# Patient Record
Sex: FEMALE | Race: WHITE | NOT HISPANIC OR LATINO | Employment: FULL TIME | URBAN - METROPOLITAN AREA
[De-identification: names, ages, dates, MRNs, and addresses within clinical notes are randomized per-mention and may not be internally consistent; named-entity substitution may affect disease eponyms.]

---

## 2017-04-05 ENCOUNTER — APPOINTMENT (OUTPATIENT)
Dept: GENERAL RADIOLOGY | Facility: CLINIC | Age: 37
End: 2017-04-05
Attending: EMERGENCY MEDICINE
Payer: COMMERCIAL

## 2017-04-05 ENCOUNTER — HOSPITAL ENCOUNTER (EMERGENCY)
Facility: CLINIC | Age: 37
Discharge: HOME | End: 2017-04-05
Attending: EMERGENCY MEDICINE | Admitting: EMERGENCY MEDICINE
Payer: COMMERCIAL

## 2017-04-05 VITALS
WEIGHT: 293 LBS | DIASTOLIC BLOOD PRESSURE: 79 MMHG | HEIGHT: 69 IN | RESPIRATION RATE: 18 BRPM | BODY MASS INDEX: 43.4 KG/M2 | OXYGEN SATURATION: 98 % | HEART RATE: 111 BPM | TEMPERATURE: 98.3 F | SYSTOLIC BLOOD PRESSURE: 149 MMHG

## 2017-04-05 DIAGNOSIS — R00.2 PALPITATIONS: ICD-10-CM

## 2017-04-05 LAB
ANION GAP SERPL CALCULATED.3IONS-SCNC: 9 MMOL/L (ref 3–14)
BASOPHILS # BLD AUTO: 0 10E9/L (ref 0–0.2)
BASOPHILS NFR BLD AUTO: 0.6 %
BUN SERPL-MCNC: 13 MG/DL (ref 7–30)
CALCIUM SERPL-MCNC: 8.7 MG/DL (ref 8.5–10.1)
CHLORIDE SERPL-SCNC: 105 MMOL/L (ref 94–109)
CO2 SERPL-SCNC: 24 MMOL/L (ref 20–32)
CREAT SERPL-MCNC: 0.61 MG/DL (ref 0.52–1.04)
D DIMER PPP FEU-MCNC: 0.3 UG/ML FEU (ref 0–0.5)
DIFFERENTIAL METHOD BLD: NORMAL
EOSINOPHIL # BLD AUTO: 0.1 10E9/L (ref 0–0.7)
EOSINOPHIL NFR BLD AUTO: 0.9 %
ERYTHROCYTE [DISTWIDTH] IN BLOOD BY AUTOMATED COUNT: 12.3 % (ref 10–15)
GFR SERPL CREATININE-BSD FRML MDRD: ABNORMAL ML/MIN/1.7M2
GLUCOSE SERPL-MCNC: 102 MG/DL (ref 70–99)
HCT VFR BLD AUTO: 38.8 % (ref 35–47)
HGB BLD-MCNC: 13.3 G/DL (ref 11.7–15.7)
IMM GRANULOCYTES # BLD: 0 10E9/L (ref 0–0.4)
IMM GRANULOCYTES NFR BLD: 0.2 %
INTERPRETATION ECG - MUSE: NORMAL
LYMPHOCYTES # BLD AUTO: 1.1 10E9/L (ref 0.8–5.3)
LYMPHOCYTES NFR BLD AUTO: 16.7 %
MCH RBC QN AUTO: 32.3 PG (ref 26.5–33)
MCHC RBC AUTO-ENTMCNC: 34.3 G/DL (ref 31.5–36.5)
MCV RBC AUTO: 94 FL (ref 78–100)
MONOCYTES # BLD AUTO: 0.5 10E9/L (ref 0–1.3)
MONOCYTES NFR BLD AUTO: 8.3 %
NEUTROPHILS # BLD AUTO: 4.7 10E9/L (ref 1.6–8.3)
NEUTROPHILS NFR BLD AUTO: 73.3 %
NRBC # BLD AUTO: 0 10*3/UL
NRBC BLD AUTO-RTO: 0 /100
PLATELET # BLD AUTO: 339 10E9/L (ref 150–450)
POTASSIUM SERPL-SCNC: 4.4 MMOL/L (ref 3.4–5.3)
RBC # BLD AUTO: 4.12 10E12/L (ref 3.8–5.2)
SODIUM SERPL-SCNC: 138 MMOL/L (ref 133–144)
TROPONIN I SERPL-MCNC: NORMAL UG/L (ref 0–0.04)
WBC # BLD AUTO: 6.4 10E9/L (ref 4–11)

## 2017-04-05 PROCEDURE — 71020 XR CHEST 2 VW: CPT

## 2017-04-05 PROCEDURE — 93005 ELECTROCARDIOGRAM TRACING: CPT

## 2017-04-05 PROCEDURE — 99285 EMERGENCY DEPT VISIT HI MDM: CPT

## 2017-04-05 PROCEDURE — 80048 BASIC METABOLIC PNL TOTAL CA: CPT | Performed by: EMERGENCY MEDICINE

## 2017-04-05 PROCEDURE — 85379 FIBRIN DEGRADATION QUANT: CPT | Performed by: EMERGENCY MEDICINE

## 2017-04-05 PROCEDURE — 85025 COMPLETE CBC W/AUTO DIFF WBC: CPT | Performed by: EMERGENCY MEDICINE

## 2017-04-05 PROCEDURE — 84484 ASSAY OF TROPONIN QUANT: CPT | Performed by: EMERGENCY MEDICINE

## 2017-04-05 RX ORDER — LIDOCAINE 40 MG/G
CREAM TOPICAL
Status: DISCONTINUED | OUTPATIENT
Start: 2017-04-05 | End: 2017-04-05 | Stop reason: HOSPADM

## 2017-04-05 ASSESSMENT — ENCOUNTER SYMPTOMS
PALPITATIONS: 1
SHORTNESS OF BREATH: 0
LIGHT-HEADEDNESS: 0

## 2017-04-05 NOTE — ED NOTES
"ABCs intact. Pt woke up this morning and felt her \"heart racing\". Pt states she checked her pulse and it was racing and irregular. Pt states HR is back to regular and states she does not feel like its racing any more.   "

## 2017-04-05 NOTE — DISCHARGE INSTRUCTIONS
"  * Heart Palpitations    Palpitations refers to the feeling that your heart is beating hard, fast or irregular. Some people describe it as \"pounding\" or \"skipped beats\". Palpitations may occur in persons with heart disease, but can also occur in healthy persons. Your doctor does not believe that anything dangerous is causing your symptoms at this time.  Heart-Related Causes:    Arrhythmia (a change from the heart's normal rhythm)    Disease of the heart valves  Non-Heart-Related Causes:    Certain medicines (such as asthma inhalers and decongestants)    Some herbal supplements, energy drinks and pills, and weight loss pills    Illegal stimulant drugs (such as cocaine, crank, methamphetamine, PCP)    Caffeine, alcohol and tobacco    Medical conditions such as thyroid disease, anemia, anxiety and panic disorder  Sometimes the cause cannot be found.  Home Care:  1. Avoid excess caffeine, alcohol, tobacco and any stimulant drugs.  2. Tell your doctor about any prescription or over-the-counter or herbal medicines you take.  Follow Up  with your doctor or as advised by our staff.  Get Prompt Medical Attention  if any of the following occur together with palpitations:    Weakness, dizziness, light-headed or fainting    Chest pain or shortness of breath    Rapid heart rate (over 120 beats per minute, at rest)    Palpitations that lasts over 20 minutes    Weakness of an arm or leg or one side of the face    Difficulty with speech or vision    0947-9933 [employer] [address] [place] [address]. All rights reserved. This information is not intended as a substitute for professional medical care. Always follow your healthcare professional's instructions.        "

## 2017-04-05 NOTE — ED PROVIDER NOTES
History     Chief Complaint:  Palpitations    The history is provided by the patient.      Coretta Huitron is a 36 year old female with a history of hypertension, CHF, sleep apnea, and depression who presents to the emergency department today for evaluation of palpitations that began this morning. Patient describes walking up after alarm went off and feeling her heart racing. Immediately after this, patient began measuring her HR to be 127 and then 141 bpm, but then later to be regular. Patient reports nausea but attributes this to not having breakfast. She also reports using Sudafed and cold-like illness OTC medications for the last 2 weeks for management of congestions. She uses CPAP at night and did so yesterday. ECHO done in 2015 had EF of 63%, but was otherwise unremarkable per patient. Patient denies leg swelling, shortness of breath, accompanying lightheadedness, or chest pain. No other concerns were voiced at this time.     Cardiac Risk Factors:  The patient has a history of hypertension, obesity, family history of early CAD, sedentary lifestyle, but denies a history of diabetes, hyperlipidemia, tobacco use, autoimmune disease, known coronary artery disease, age greater than 65.    PE/DVT Risk Factors:  The patient denies a personal or family history of PE, DVT, or other clotting disorders. The patient denies any recent travel, surgery, or other prolonged immobilization. The patient denies tobacco use or hormone use.     Allergies:  NKA     Medications:    Wellbutrin  Lexapro  Tylenol    Past Medical History:    Hypertension  Depression, recurrent episode 05/17/2016  Sleep Apnea  Tibia/fibula fracture, 2014  Knee pain, 2014  Morbid obesity with BMI of 50.0-59.9 (H), 2013  Health Care Home, 2012     Past Surgical History:   Fusion Metatarsal, 2004  Excis primary ganglion L wrist, 1994, 1995  Excis recurrent ganglion R wrist, 2009, 2012  Bunionectomy, 2013  Laparoscopic cholecystectomy, 2015     Family  "History:   Mother: Thyroid disease  Father: Hypertension, Coronary Artery Disease, Sleep Apnea  Maternal Grandfather: Cancer  Paternal Grandfather: Colon Cancer     Social History:  Negative for tobacco use.  Positive for alcohol use, very rare.  Marital Status: Single [1]      Review of Systems   Respiratory: Negative for shortness of breath.    Cardiovascular: Positive for palpitations. Negative for chest pain and leg swelling.   Neurological: Negative for light-headedness.   All other systems reviewed and are negative.    Physical Exam     Patient Vitals for the past 24 hrs:   BP Temp Temp src Pulse Heart Rate Resp SpO2 Height Weight   04/05/17 1030 149/79 - - - - - 98 % - -   04/05/17 1015 - - - - 76 - 98 % - -   04/05/17 1000 150/80 - - - 79 - 97 % - -   04/05/17 0930 126/70 - - - 81 - 97 % - -   04/05/17 0915 - - - - 73 - 97 % - -   04/05/17 0900 131/75 - - - 81 - 99 % - -   04/05/17 0845 - - - - 78 - 95 % - -   04/05/17 0830 - - - - 91 - - - -   04/05/17 0817 (!) 145/102 98.3  F (36.8  C) Oral 111 - 18 98 % - -   04/05/17 0815 - - - - - - - 1.753 m (5' 9\") (!) 176.9 kg (390 lb)     Physical Exam  Constitutional: Patient appears well-developed and well-nourished. There is mild distress.   Head: No external signs of trauma noted.  Neck: No JVD noted  Eyes: Pupils are equal, round, and reactive to light.   Cardiovascular: Normal rate, regular rhythm and normal heart sounds.  Exam reveals no gallop and no friction rub.  No murmur heard. Normal peripheral pulses.  Pulmonary/Chest: Effort normal and breath sounds normal. No respiratory distress. Patient has no wheezes. Patient has no rales.   Abdominal: Obese. Soft. There is no tenderness.   Extremities: No edema noted  Neurological: Patient is alert and oriented to person, place, and time.   Skin: Skin is warm and dry. There is no diaphoresis noted.     Emergency Department Course     ECG:  ECG taken at 0819, ECG read at 0819  Normal sinus rhythm  Nonspecific ST " abnormality  Abnormal ECG  Rate 97 bpm. GA interval 136. QRS duration 98. QT/QTc 368/467. P-R-T axes 34 2 61.    Laboratory:  Laboratory findings were communicated with the patient who voiced understanding of the findings.  CBC: AWNL. (WBC 6.4, HGB 13.3, )   BMP: Glucose: 102(H), Creatinine 0.61  Troponin: <0.015  D dimer: 0.3    Emergency Department Course:  Nursing notes and vitals reviewed.  I performed an exam of the patient as documented above.   IV was inserted and blood was drawn for laboratory testing, results above.  The patient was sent for a Chest Xray while in the emergency department, results above.     At 1046 the patient was rechecked and updated on lab results. We also discussed plan of care.     I personally reviewed the treatment plan with the Patient and answered all related questions prior to discharge.    Impression & Plan      Medical Decision Making:  Coretta Huitron is a 36 year old female who presents to the ED for evaluation of palpitations. She was not having shortness of breath or chest pain. She states that at home she checked her HR, which was in the 120's. In the ER, I have seen it as high as in the 100's, however during her time in the ED it has steadily declined to its current rate. The patient does have a history of sleep apnea as well as hypertension, but she states that she was actually able to come off medication for HTN. She uses CPAP every night. She states that she has been taking Sudafed for the last couple weeks for an upper respiratory cold and she was wondering if that could possibly be a cause of her palpitations. Her workup in the ER was negative. Her D dimer was also normal. Her EKG does not show any change from previous. Her HEART score is 2. I believe the patient should be stable for discharge, but I have encouraged her to follow up with her PCP and certainly discontinue the use of the Sudafed as this could be the cause of her palpitations. Anticipatory  guidance given prior to discharge.     Diagnosis:    ICD-10-CM    1. Palpitations R00.2        Disposition:   Discharged to home. Plan for follow up with Bhavana Joseph Disclosure:  I, Jasen Ackerman, am serving as a scribe at 8:15 AM on 4/5/2017 to document services personally performed by Nagi Gilmore DO, based on my observations and the provider's statements to me.  [facility] EMERGENCY DEPARTMENT       Nagi Gilmore DO  04/05/17 1143

## 2017-04-05 NOTE — ED AVS SNAPSHOT
" [facility]    [address]    [facility] [address]    Phone:  [phone]    Fax:  [phone]                                       Coretta Huitron   MRN: [id number]    Department:  [facility]   Date of Visit:  4/5/2017           Patient Information     Date Of Birth          1980        Your diagnoses for this visit were:     Palpitations        You were seen by Nagi Gilmore DO.      Follow-up Information     Follow up with Bhavana Joseph PA. Call in 2 days.    Specialty:  Family Practice    Why:  As needed    Contact information:    [facility]  [address] [facility] [address]  [phone]          Follow up with [facility].    Specialty:  EMERGENCY MEDICINE    Contact information:    [address]  [facility] [address]  [phone]        Discharge Instructions         * Heart Palpitations    Palpitations refers to the feeling that your heart is beating hard, fast or irregular. Some people describe it as \"pounding\" or \"skipped beats\". Palpitations may occur in persons with heart disease, but can also occur in healthy persons. Your doctor does not believe that anything dangerous is causing your symptoms at this time.  Heart-Related Causes:    Arrhythmia (a change from the heart's normal rhythm)    Disease of the heart valves  Non-Heart-Related Causes:    Certain medicines (such as asthma inhalers and decongestants)    Some herbal supplements, energy drinks and pills, and weight loss pills    Illegal stimulant drugs (such as cocaine, crank, methamphetamine, PCP)    Caffeine, alcohol and tobacco    Medical conditions such as thyroid disease, anemia, anxiety and panic disorder  Sometimes the cause cannot be found.  Home Care:  1. Avoid excess caffeine, alcohol, tobacco and any stimulant drugs.  2. Tell your doctor about any " prescription or over-the-counter or herbal medicines you take.  Follow Up  with your doctor or as advised by our staff.  Get Prompt Medical Attention  if any of the following occur together with palpitations:    Weakness, dizziness, light-headed or fainting    Chest pain or shortness of breath    Rapid heart rate (over 120 beats per minute, at rest)    Palpitations that lasts over 20 minutes    Weakness of an arm or leg or one side of the face    Difficulty with speech or vision    1437-5935 [employer]. [address] [place] [address]. All rights reserved. This information is not intended as a substitute for professional medical care. Always follow your healthcare professional's instructions.          24 Hour Appointment Hotline       To make an appointment at any [facility], call [phone] ([phone]). If you don't have a family doctor or clinic, we will help you find one. [facility] are conveniently located to serve the needs of you and your family.             Review of your medicines      Our records show that you are taking the medicines listed below. If these are incorrect, please call your family doctor or clinic.        Dose / Directions Last dose taken    amoxicillin-clavulanate 875-125 MG per tablet   Commonly known as:  AUGMENTIN   Dose:  1 tablet   Quantity:  20 tablet        Take 1 tablet by mouth 2 times daily   Refills:  0        buPROPion 150 MG 24 hr tablet   Commonly known as:  WELLBUTRIN XL   Dose:  150 mg   Quantity:  30 tablet        Take 1 tablet (150 mg) by mouth every morning   Refills:  11        cholecalciferol 1000 UNIT tablet   Commonly known as:  vitamin D   Dose:  5000 Units   Quantity:  100 tablet        Take 5 tablets (5,000 Units) by mouth daily   Refills:  3        escitalopram 20 MG tablet   Commonly known as:  LEXAPRO   Dose:  20 mg   Quantity:  30 tablet        Take 1 tablet (20 mg) by mouth daily   Refills:  11         guaiFENesin-codeine 100-10 MG/5ML Soln solution   Commonly known as:  ROBITUSSIN AC   Dose:  1 tsp.   Quantity:  120 mL        Take 5 mLs by mouth every 4 hours as needed for cough   Refills:  0        ibuprofen 600 MG tablet   Commonly known as:  ADVIL/MOTRIN   Dose:  600 mg   Quantity:  30 tablet        Take 1 tablet (600 mg) by mouth every 6 hours as needed for pain (mild)   Refills:  0        TYLENOL PO   Dose:  500 mg        Take 500 mg by mouth   Refills:  0        VITAMIN C PO        Refills:  0        ZYRTEC ALLERGY PO        Refills:  0                Procedures and tests performed during your visit     Basic metabolic panel    CBC with platelets differential    Chest XR,  PA & LAT    D dimer quantitative    EKG 12 lead    Peripheral IV catheter    Troponin I      Orders Needing Specimen Collection     None      Pending Results     No orders found from 4/3/2017 to 4/6/2017.            Pending Culture Results     No orders found from 4/3/2017 to 4/6/2017.            Test Results From Your Hospital Stay        4/5/2017  9:05 AM      Component Results     Component Value Ref Range & Units Status    WBC 6.4 4.0 - 11.0 10e9/L Final    RBC Count 4.12 3.8 - 5.2 10e12/L Final    Hemoglobin 13.3 11.7 - 15.7 g/dL Final    Hematocrit 38.8 35.0 - 47.0 % Final    MCV 94 78 - 100 fl Final    MCH 32.3 26.5 - 33.0 pg Final    MCHC 34.3 31.5 - 36.5 g/dL Final    RDW 12.3 10.0 - 15.0 % Final    Platelet Count 339 150 - 450 10e9/L Final    Diff Method Automated Method  Final    % Neutrophils 73.3 % Final    % Lymphocytes 16.7 % Final    % Monocytes 8.3 % Final    % Eosinophils 0.9 % Final    % Basophils 0.6 % Final    % Immature Granulocytes 0.2 % Final    Nucleated RBCs 0 0 /100 Final    Absolute Neutrophil 4.7 1.6 - 8.3 10e9/L Final    Absolute Lymphocytes 1.1 0.8 - 5.3 10e9/L Final    Absolute Monocytes 0.5 0.0 - 1.3 10e9/L Final    Absolute Eosinophils 0.1 0.0 - 0.7 10e9/L Final    Absolute Basophils 0.0 0.0 - 0.2 10e9/L  Final    Abs Immature Granulocytes 0.0 0 - 0.4 10e9/L Final    Absolute Nucleated RBC 0.0  Final         4/5/2017  9:32 AM      Component Results     Component Value Ref Range & Units Status    Sodium 138 133 - 144 mmol/L Final    Potassium 4.4 3.4 - 5.3 mmol/L Final    Specimen slightly hemolyzed, potassium may be falsely elevated    Chloride 105 94 - 109 mmol/L Final    Carbon Dioxide 24 20 - 32 mmol/L Final    Anion Gap 9 3 - 14 mmol/L Final    Glucose 102 (H) 70 - 99 mg/dL Final    Urea Nitrogen 13 7 - 30 mg/dL Final    Creatinine 0.61 0.52 - 1.04 mg/dL Final    GFR Estimate >90  Non  GFR Calc   >60 mL/min/1.7m2 Final    GFR Estimate If Black >90   GFR Calc   >60 mL/min/1.7m2 Final    Calcium 8.7 8.5 - 10.1 mg/dL Final         4/5/2017  9:32 AM      Component Results     Component Value Ref Range & Units Status    Troponin I ES  0.000 - 0.045 ug/L Final    <0.015  The 99th percentile for upper reference range is 0.045 ug/L.  Troponin values in   the range of 0.045 - 0.120 ug/L may be associated with risks of adverse   clinical events.           4/5/2017  9:16 AM      Component Results     Component Value Ref Range & Units Status    D Dimer 0.3 0.0 - 0.50 ug/ml FEU Final    This D-dimer assay is intended for use in conjuntion with a clinical pretest   probability assessment model to exclude pulmonary embolism (PE) and as an aid   in the diagnosis of deep venous thrombosis (DVT) in outpatients suspected of   PE   or DVT. The cut-off value is 0.5 g/mL FEU.           4/5/2017  9:55 AM      Narrative     XR CHEST 2 VW 4/5/2017 9:54 AM    HISTORY: palpitations        Impression     IMPRESSION: Negative.    NATO FRANCIS MD                Clinical Quality Measure: Blood Pressure Screening     Your blood pressure was checked while you were in the emergency department today. The last reading we obtained was  BP: 149/79 . Please read the guidelines below about what these numbers mean and  what you should do about them.  If your systolic blood pressure (the top number) is less than 120 and your diastolic blood pressure (the bottom number) is less than 80, then your blood pressure is normal. There is nothing more that you need to do about it.  If your systolic blood pressure (the top number) is 120-139 or your diastolic blood pressure (the bottom number) is 80-89, your blood pressure may be higher than it should be. You should have your blood pressure rechecked within a year by a primary care provider.  If your systolic blood pressure (the top number) is 140 or greater or your diastolic blood pressure (the bottom number) is 90 or greater, you may have high blood pressure. High blood pressure is treatable, but if left untreated over time it can put you at risk for heart attack, stroke, or kidney failure. You should have your blood pressure rechecked by a primary care provider within the next 4 weeks.  If your provider in the emergency department today gave you specific instructions to follow-up with your doctor or provider even sooner than that, you should follow that instruction and not wait for up to 4 weeks for your follow-up visit.        Thank you for choosing [facility]       Thank you for choosing [facility] for your care. Our goal is always to provide you with excellent care. Hearing back from our patients is one way we can continue to improve our services. Please take a few minutes to complete the written survey that you may receive in the mail after you visit with us. Thank you!        [facility] Information     [facility] gives you secure access to your electronic health record. If you see a primary care provider, you can also send messages to your care team and make appointments. If you have questions, please call your primary care clinic.  If you do not have a primary care provider, please call [phone] and they will assist you.        Care EveryWhere ID     This is your Care EveryWhere ID. This  could be used by other organizations to access your [place] medical records  [id number]        After Visit Summary       This is your record. Keep this with you and show to your community pharmacist(s) and doctor(s) at your next visit.

## 2017-04-05 NOTE — ED AVS SNAPSHOT
[facility] Emergency Department    [address]    [facility] [address]    Phone:  [phone]    Fax:  [phone]                                       Coretta Huitron   MRN: [id number]    Department:  [facility] Emergency Department   Date of Visit:  4/5/2017           After Visit Summary Signature Page     I have received my discharge instructions, and my questions have been answered. I have discussed any challenges I see with this plan with the nurse or doctor.    ..........................................................................................................................................  Patient/Patient Representative Signature      ..........................................................................................................................................  Patient Representative Print Name and Relationship to Patient    ..................................................               ................................................  Date                                            Time    ..........................................................................................................................................  Reviewed by Signature/Title    ...................................................              ..............................................  Date                                                            Time

## 2017-04-17 ENCOUNTER — OFFICE VISIT (OUTPATIENT)
Dept: FAMILY MEDICINE | Facility: CLINIC | Age: 37
End: 2017-04-17

## 2017-04-17 VITALS
SYSTOLIC BLOOD PRESSURE: 120 MMHG | BODY MASS INDEX: 43.4 KG/M2 | HEART RATE: 75 BPM | OXYGEN SATURATION: 98 % | HEIGHT: 69 IN | WEIGHT: 293 LBS | TEMPERATURE: 98.3 F | DIASTOLIC BLOOD PRESSURE: 80 MMHG

## 2017-04-17 DIAGNOSIS — R52 BODY ACHES: Primary | ICD-10-CM

## 2017-04-17 PROBLEM — Z82.49 FAMILY HISTORY OF HEART FAILURE: Status: ACTIVE | Noted: 2017-04-17

## 2017-04-17 LAB
FLUAV AG UPPER RESP QL IA.RAPID: NORMAL
FLUBV AG UPPER RESP QL IA.RAPID: NORMAL

## 2017-04-17 PROCEDURE — 87804 INFLUENZA ASSAY W/OPTIC: CPT | Performed by: PHYSICIAN ASSISTANT

## 2017-04-17 PROCEDURE — 99213 OFFICE O/P EST LOW 20 MIN: CPT | Performed by: PHYSICIAN ASSISTANT

## 2017-04-17 NOTE — PROGRESS NOTES
"CC: Flu-like symptoms    History:  2 days ago, Coretta started noticing body aches, fatigue, headache, cough, chills. Yesterday her symptoms got even worse. She denies any fevers. No shortness of breath, wheezing. Still hydrating and getting some nutrition.    No known exposures. Did get flu shot this year. 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    PMH, MEDICATIONS, ALLERGIES, SOCIAL AND FAMILY HISTORY in [facility] and reviewed by me personally.      ROS negative other than the symptoms noted above in the HPI.        Examination   /80 (BP Location: Left arm, Patient Position: Chair, Cuff Size: Adult Large)  Pulse 75  Temp 98.3  F (36.8  C) (Oral)  Ht 1.74 m (5' 8.5\")  Wt (!) 183.1 kg (403 lb 9.6 oz)  LMP 04/05/2017  SpO2 98%  BMI 60.47 kg/m2       Constitutional: Sitting comfortably, in no acute distress. Vital signs noted  Eyes: pupils equal round reactive to light and accomodation, extra ocular movements intact  Ears: external canals and TMs free of abnormalities  Nose: patent, without mucosal abnormalities  Mouth and throat: without erythema or lesions of the mucosa  Neck:  no adenopathy, trachea midline and normal to palpation  Cardiovascular:  regular rate and rhythm, no murmurs, clicks, or gallops  Respiratory:  normal respiratory rate and rhythm, lungs clear to auscultation  Psychiatric: mentation appears normal and affect normal/bright        A/P    ICD-10-CM    1. Body aches R52 Influenza A and B (BFP)       DISCUSSION: Flu swab negative. This is likely a viral syndrome. Recommended ibuprofen for body aches, and headaches. Cough suppressant to take if going to work. Push fluids, nutrition, and try to rest. Gave work note to stay home tomorrow. If she can't get enough fluids to keep urine light yellow and symptoms worsen, should go to ER.    follow up visit: As needed    Alondra Blakely PA-C  [facility]    "

## 2017-04-17 NOTE — MR AVS SNAPSHOT
After Visit Summary   4/17/2017    Coretta Huitron    MRN: [id number]           Patient Information     Date Of Birth          1980        Visit Information        Provider Department      4/17/2017 12:15 PM Alondra Blakely PA-C [facility]        Today's Diagnoses     Body aches    -  1       Follow-ups after your visit        Follow-up notes from your care team     Return if symptoms worsen or fail to improve.      Your next 10 appointments already scheduled     Apr 19, 2017 10:00 AM T   [facility] Office Visit with BRYAN Olson   [facility] ([facility])    [address]  [address]  [facility] [address] [phone]              Who to contact     If you have questions or need follow up information about today's clinic visit or your schedule please contact [facility] directly at [phone].  Normal or non-critical lab and imaging results will be communicated to you by Lolly Wolly Doodlehart, letter or phone within 4 business days after the clinic has received the results. If you do not hear from us within 7 days, please contact the clinic through Health Plotter or phone. If you have a critical or abnormal lab result, we will notify you by phone as soon as possible.  Submit refill requests through Health Plotter or call your pharmacy and they will forward the refill request to us. Please allow 3 business days for your refill to be completed.          Additional Information About Your Visit        [employer]le[employer] Information     Health Plotter gives you secure access to your electronic health record. If you see a primary care provider, you can also send messages to your care team and make appointments. If you have questions, please call your primary care clinic.  If you do not have a primary care provider, please call [phone] and they will assist you.        Care EveryWhere ID     This is your Care  "EveryWhere ID. This could be used by other organizations to access your [place] medical records  [id number]        Your Vitals Were     Pulse Temperature Height Last Period Pulse Oximetry BMI (Body Mass Index)    75 98.3  F (36.8  C) (Oral) 1.74 m (5' 8.5\") 04/05/2017 98% 60.47 kg/m2       Blood Pressure from Last 3 Encounters:   04/17/17 120/80   04/05/17 149/79   11/16/16 104/62    Weight from Last 3 Encounters:   04/17/17 (!) 183.1 kg (403 lb 9.6 oz)   04/05/17 (!) 176.9 kg (390 lb)   11/16/16 (!) 176.9 kg (390 lb)              We Performed the Following     Influenza A and B (BFP)        Primary Care Provider Office Phone # Fax #    BRYAN Olson [phone]       [facility]  [address] [facility] [address]        Thank you!     Thank you for choosing [facility]  for your care. Our goal is always to provide you with excellent care. Hearing back from our patients is one way we can continue to improve our services. Please take a few minutes to complete the written survey that you may receive in the mail after your visit with us. Thank you!             Your Updated Medication List - Protect others around you: Learn how to safely use, store and throw away your medicines at www.disposemymeds.org.          This list is accurate as of: 4/17/17  4:59 PM.  Always use your most recent med list.                   Brand Name Dispense Instructions for use    buPROPion 150 MG 24 hr tablet    WELLBUTRIN XL    30 tablet    Take 1 tablet (150 mg) by mouth every morning       cholecalciferol 5000 UNITS Caps capsule    vitamin D3     Take by mouth daily       escitalopram 20 MG tablet    LEXAPRO    30 tablet    Take 1 tablet (20 mg) by mouth daily       ibuprofen 600 MG tablet    ADVIL/MOTRIN    30 tablet    Take 1 tablet (600 mg) by mouth every 6 hours as needed for pain (mild)       TYLENOL PO      Take 500 mg by mouth       VITAMIN C PO          " ZYRTEC ALLERGY PO

## 2017-04-17 NOTE — NURSING NOTE
Coretta is here for possible influenza    Pre-Visit Screening :  Immunizations : up to date  Colonoscopy : NA  Mammogram : NA  Asthma Action Test/Plan : NA  PHQ9/GAD7 :  NA  Pulse - regular  My Chart - accepts    CLASSIFICATION OF OVERWEIGHT AND OBESITY BY BMI                         Obesity Class           BMI(kg/m2)  Underweight                                    < 18.5  Normal                                         18.5-24.9  Overweight                                     25.0-29.9  OBESITY                     I                  30.0-34.9                              II                 35.0-39.9  EXTREME OBESITY             III                >40                             Patient's  BMI Body mass index is 60.47 kg/(m^2).  http://hin.nhlbi.nih.gov/menuplanner/menu.cgi  Questioned patient about current smoking habits.  Pt. has never smoked.

## 2017-04-19 ENCOUNTER — OFFICE VISIT (OUTPATIENT)
Dept: FAMILY MEDICINE | Facility: CLINIC | Age: 37
End: 2017-04-19

## 2017-04-19 VITALS
WEIGHT: 293 LBS | TEMPERATURE: 98.4 F | RESPIRATION RATE: 20 BRPM | DIASTOLIC BLOOD PRESSURE: 84 MMHG | SYSTOLIC BLOOD PRESSURE: 128 MMHG | HEIGHT: 69 IN | HEART RATE: 74 BPM | BODY MASS INDEX: 43.4 KG/M2 | OXYGEN SATURATION: 97 %

## 2017-04-19 DIAGNOSIS — E55.9 VITAMIN D DEFICIENCY: ICD-10-CM

## 2017-04-19 DIAGNOSIS — R05.9 COUGH: Primary | ICD-10-CM

## 2017-04-19 LAB
% GRANULOCYTES: 65.3 %
HCT VFR BLD AUTO: 44.2 % (ref 35–47)
HEMOGLOBIN: 14.5 G/DL (ref 11.7–15.7)
LYMPHOCYTES NFR BLD AUTO: 26.8 %
MCH RBC QN AUTO: 31.9 PG (ref 26–33)
MCHC RBC AUTO-ENTMCNC: 32.8 G/DL (ref 31–36)
MCV RBC AUTO: 97.4 FL (ref 78–100)
MONOCYTES NFR BLD AUTO: 7.9 %
PLATELET COUNT - QUEST: 433 10^9/L (ref 150–375)
RBC # BLD AUTO: 4.54 10*12/L (ref 3.8–5.2)
WBC # BLD AUTO: 5.4 10*9/L (ref 4–11)

## 2017-04-19 PROCEDURE — 36415 COLL VENOUS BLD VENIPUNCTURE: CPT | Performed by: PHYSICIAN ASSISTANT

## 2017-04-19 PROCEDURE — 82306 VITAMIN D 25 HYDROXY: CPT | Mod: 90 | Performed by: PHYSICIAN ASSISTANT

## 2017-04-19 PROCEDURE — 99213 OFFICE O/P EST LOW 20 MIN: CPT | Performed by: PHYSICIAN ASSISTANT

## 2017-04-19 PROCEDURE — 85025 COMPLETE CBC W/AUTO DIFF WBC: CPT | Performed by: PHYSICIAN ASSISTANT

## 2017-04-19 RX ORDER — CODEINE PHOSPHATE AND GUAIFENESIN 10; 100 MG/5ML; MG/5ML
1 SOLUTION ORAL EVERY 4 HOURS PRN
Qty: 120 ML | Refills: 0 | Status: SHIPPED | OUTPATIENT
Start: 2017-04-19 | End: 2017-07-12

## 2017-04-19 NOTE — LETTER
[facility]  [facility] [address]  [place]  [address]  [phone]              Return to  Work Release    Date: 4/19/2017      Name: Coretta Huitron                       YOB: 1980  Soc.Sec.No: [id number]      The patient was seen at [facility]    Restrictions if any: OFF Work for today and tomorrow.    Resume Activity: With no limitations.       Thanks,  Bhavana Joseph PA-C

## 2017-04-19 NOTE — NURSING NOTE
Coretta Huitron is here for a cough that she wants check and also Vit D recheck.  Questioned patient about current smoking habits.  Pt. has never smoked.  Body mass index is 25.89 kg/(m^2).  PULSE regular  My Chart: active  CLASSIFICATION OF OVERWEIGHT AND OBESITY BY BMI                        Obesity Class           BMI(kg/m2)  Underweight                                    < 18.5  Normal                                         18.5-24.9  Overweight                                     25.0-29.9  OBESITY                     I                  30.0-34.9                             II                 35.0-39.9  EXTREME OBESITY             III                >40                            Patient's  BMI Body mass index is 60.38 kg/(m^2).  http://hin.nhlbi.nih.gov/menuplanner/menu.cgi    Pre-visit planning  Immunizations - up to date  Colonoscopy -   Mammogram -   Asthma -   PHQ9 -    DANIA-7 -

## 2017-04-19 NOTE — MR AVS SNAPSHOT
"              After Visit Summary   4/19/2017    Coretta Huitron    MRN: [id number]           Patient Information     Date Of Birth          1980        Visit Information        Provider Department      4/19/2017 10:00 AM Bhavana Joseph PA [facility]        Today's Diagnoses     Cough    -  1    Vitamin D deficiency           Follow-ups after your visit        Who to contact     If you have questions or need follow up information about today's clinic visit or your schedule please contact [facility] directly at [phone].  Normal or non-critical lab and imaging results will be communicated to you by Azullohart, letter or phone within 4 business days after the clinic has received the results. If you do not hear from us within 7 days, please contact the clinic through Azullohart or phone. If you have a critical or abnormal lab result, we will notify you by phone as soon as possible.  Submit refill requests through [employer] or call your pharmacy and they will forward the refill request to us. Please allow 3 business days for your refill to be completed.          Additional Information About Your Visit        MyChart Information     [employer]ptive gives you secure access to your electronic health record. If you see a primary care provider, you can also send messages to your care team and make appointments. If you have questions, please call your primary care clinic.  If you do not have a primary care provider, please call [phone] and they will assist you.        Care EveryWhere ID     This is your Care EveryWhere ID. This could be used by other organizations to access your [place] medical records  [id number]        Your Vitals Were     Pulse Temperature Respirations Height Last Period Pulse Oximetry    74 98.4  F (36.9  C) (Oral) 20 1.74 m (5' 8.5\") 04/05/2017 97%    BMI (Body Mass Index)                   60.38 kg/m2            Blood Pressure from Last 3 " Encounters:   04/19/17 128/84   04/17/17 120/80   04/05/17 149/79    Weight from Last 3 Encounters:   04/19/17 (!) 182.8 kg (403 lb)   04/17/17 (!) 183.1 kg (403 lb 9.6 oz)   04/05/17 (!) 176.9 kg (390 lb)              We Performed the Following     CL AFF HEMOGRAM/PLATE/DIFF (BFP)     VENOUS COLLECTION     Vitamin D Deficiency          Today's Medication Changes          These changes are accurate as of: 4/19/17 11:59 PM.  If you have any questions, ask your nurse or doctor.               Start taking these medicines.        Dose/Directions    guaiFENesin-codeine 100-10 MG/5ML Soln solution   Commonly known as:  ROBITUSSIN AC   Used for:  Cough   Started by:  Bhavana Joseph PA        Dose:  1 tsp.   Take 5 mLs by mouth every 4 hours as needed for cough   Quantity:  120 mL   Refills:  0            Where to get your medicines      Some of these will need a paper prescription and others can be bought over the counter.  Ask your nurse if you have questions.     Bring a paper prescription for each of these medications     guaiFENesin-codeine 100-10 MG/5ML Soln solution                Primary Care Provider Office Phone # Fax #    BRYAN Olson [phone]       [place]  [address] [facility] [address]        Thank you!     Thank you for choosing [facility]  for your care. Our goal is always to provide you with excellent care. Hearing back from our patients is one way we can continue to improve our services. Please take a few minutes to complete the written survey that you may receive in the mail after your visit with us. Thank you!             Your Updated Medication List - Protect others around you: Learn how to safely use, store and throw away your medicines at www.disposemymeds.org.          This list is accurate as of: 4/19/17 11:59 PM.  Always use your most recent med list.                   Brand Name Dispense Instructions  for use    buPROPion 150 MG 24 hr tablet    WELLBUTRIN XL    30 tablet    Take 1 tablet (150 mg) by mouth every morning       cholecalciferol 5000 UNITS Caps capsule    vitamin D3     Take by mouth daily       escitalopram 20 MG tablet    LEXAPRO    30 tablet    Take 1 tablet (20 mg) by mouth daily       guaiFENesin-codeine 100-10 MG/5ML Soln solution    ROBITUSSIN AC    120 mL    Take 5 mLs by mouth every 4 hours as needed for cough       ibuprofen 600 MG tablet    ADVIL/MOTRIN    30 tablet    Take 1 tablet (600 mg) by mouth every 6 hours as needed for pain (mild)       TYLENOL PO      Take 500 mg by mouth       VITAMIN C PO          ZYRTEC ALLERGY PO

## 2017-04-19 NOTE — PROGRESS NOTES
SUBJECTIVE:                                                    Coretta Huitron is a 36 year old female who presents to clinic today for the following health issues:    Coretta  is here today because of continued cough and body aches.  Seen earlier this week by Alondra    Continues to have low grade temp, chills, body aches, laryngitis, cough, rhinorrhea, headache and fatigue.     Body aches started Saturday am.    Pt did get flu shot.    Delsym didn't help  Robitussin with AC helped    Strated getting SOB up stairs yesterday      Was in ED on 4/5  Woke up   Lueders palpitations  Pulse was fast and irregular.  Pulse 121 and 147 initially  No pain or  SOB.  Within 30 min rate was regular and slowed.    Was taking Sudafed.    Family Uncle - A fib  Rapid ventricular rate, has defib.       Chest Xray on 4/5 normal.    Vit 3D 5,000 IU daily.           BP Readings from Last 3 Encounters:   04/19/17 128/84   04/17/17 120/80   04/05/17 149/79    Wt Readings from Last 3 Encounters:   04/19/17 (!) 182.8 kg (403 lb)   04/17/17 (!) 183.1 kg (403 lb 9.6 oz)   04/05/17 (!) 176.9 kg (390 lb)                  Patient Active Problem List   Diagnosis     Irritable bowel syndrome     ACP (advance care planning)     Health Care Home     Morbid obesity with BMI of 50.0-59.9, adult (H)     Obstructive sleep apnea syndrome     Vitamin D deficiency     Tooth decay/ multiple cavities 2013     Knee pain     Major depressive disorder, recurrent episode, moderate (H)     Family history of heart failure     Hypertension     Past Surgical History:   Procedure Laterality Date     BUNIONECTOMY  12/5/2013    Procedure: BUNIONECTOMY;   correct of bunion and bunionette deformation right with oteotomies;  Surgeon: Tanesha Rosa DPM;  Location: [facility] OR     HC CORRECT BUNION,SIMPLE  12/2004    fusion metatarsal     HC EXCIS PRIMARY GANGLION WRIST  1994, 1995    left      HC EXCIS RECURRENT GANGLION WRIST   2009, 2012    right     HC REMOVE  "TONSILS/ADENOIDS,<13 Y/O      age 8     LAPAROSCOPIC CHOLECYSTECTOMY N/A 2015    Procedure: LAPAROSCOPIC CHOLECYSTECTOMY;  Surgeon: Alyson Horne MD;  Location:  OR       Social History   Substance Use Topics     Smoking status: Never Smoker     Smokeless tobacco: Never Used     Alcohol use 0.0 oz/week     0 Standard drinks or equivalent per week      Comment: very rare     Family History   Problem Relation Age of Onset     CANCER Maternal Grandfather      CANCER Paternal Grandfather      Colon Cancer Paternal Grandfather      Thyroid Disease Mother      Hypertension Father      Coronary Artery Disease Father 65     MI -  in      Thyroid Disease Maternal Grandmother      Sleep Apnea Father          Current Outpatient Prescriptions   Medication Sig Dispense Refill     guaiFENesin-codeine (ROBITUSSIN AC) 100-10 MG/5ML SOLN solution Take 5 mLs by mouth every 4 hours as needed for cough 120 mL 0     cholecalciferol (VITAMIN D3) 5000 UNITS CAPS capsule Take by mouth daily       Acetaminophen (TYLENOL PO) Take 500 mg by mouth       Ascorbic Acid (VITAMIN C PO)        Cetirizine HCl (ZYRTEC ALLERGY PO)        buPROPion (WELLBUTRIN XL) 150 MG 24 hr tablet Take 1 tablet (150 mg) by mouth every morning 30 tablet 11     escitalopram (LEXAPRO) 20 MG tablet Take 1 tablet (20 mg) by mouth daily 30 tablet 11     ibuprofen (ADVIL,MOTRIN) 600 MG tablet Take 1 tablet (600 mg) by mouth every 6 hours as needed for pain (mild) 30 tablet 0     Allergies   Allergen Reactions     No Known Drug Allergies        OBJECTIVE:                                                    /84 (BP Location: Left arm, Patient Position: Chair, Cuff Size: Adult Large)  Pulse 74  Temp 98.4  F (36.9  C) (Oral)  Resp 20  Ht 1.74 m (5' 8.5\")  Wt (!) 182.8 kg (403 lb)  LMP 2017  SpO2 97%  BMI 60.38 kg/m2   Body mass index is 60.38 kg/(m^2).     GENERAL: healthy, alert and no distress  EYES:Lids and Conjunctival normal, no " discharge present bilaterally  EARS:   Right: CANAL and TM is normal: no effusions, no erythema, and normal landmarks  Left: CANAL and TM is normal: no effusions, no erythema, and normal landmarks  NOSE: normal  OROPHARYNX: normal, no tonsillar hypertrophy and no exudates present  NECK: normal, supple and no adenopathy  HEART: regular rate and rhythm; no murmurs, clicks, or gallops  LUNGS: CTA bilaterally  SKIN:Warm, Dry and No Rash    Labs Resulted Today:   Results for orders placed or performed in visit on 04/19/17   Vitamin D Deficiency   Result Value Ref Range    Vitamin D 25-OH Total 31 30 - 100 ng/mL   CL AFF HEMOGRAM/PLATE/DIFF (BFP)   Result Value Ref Range    WBC 5.4 4.0 - 11 10*9/L    RBC Count 4.54 3.8 - 5.2 10*12/L    Hemoglobin 14.5 11.7 - 15.7 g/dL    Hematocrit 44.2 35.0 - 47.0 %    MCV 97.4 78 - 100 fL    MCH 31.9 26 - 33 pg    MCHC 32.8 31 - 36 g/dL    Platelet Count 433 (A) 150 - 375 10^9/L    % Granulocytes 65.3 %    % Lymphocytes 26.8 %    % Monocytes 7.9 %                ASSESSMENT/PLAN:                                                          P:     1. Cough  - CL AFF HEMOGRAM/PLATE/DIFF (BFP)  - VENOUS COLLECTION  - guaiFENesin-codeine (ROBITUSSIN AC) 100-10 MG/5ML SOLN solution; Take 5 mLs by mouth every 4 hours as needed for cough  Dispense: 120 mL; Refill: 0    CBC reassuring, lung exam NL, O2 Sats normal.  Advised RTC if sx worsening.  No EtOH or driving with Robitussin AC    2. Vitamin D deficiency    - Vitamin D Deficiency  - CL AFF HEMOGRAM/PLATE/DIFF (BFP)      The patient is to RTC prn if these symptoms worsen or fail to improve as anticipated.       Bhavana Joseph PA-C  4/19/2017

## 2017-04-20 LAB — VITAMIN D, 25-OH, TOTAL - QUEST: 31 NG/ML (ref 30–100)

## 2017-06-21 ENCOUNTER — MYC MEDICAL ADVICE (OUTPATIENT)
Dept: FAMILY MEDICINE | Facility: CLINIC | Age: 37
End: 2017-06-21

## 2017-06-21 NOTE — TELEPHONE ENCOUNTER
From: Coretta Huitron  To: Bhavana Joseph PA  Sent: 6/21/2017 1:33 PM CDT  Subject: Do I need an appointment?    Jhon Bateman,    I have a quick question. The last few days I've had recurring nausea. 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 I also have a burning feeling in the pit of my stomach. Today the nausea and burning happened about 45-1hr after eating lunch. I'm wondering if I have a stress stomach ulcer? 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 Wondering if there's any testing or treatment? In the past dr. lakhani has tested me for H.Pylori that has always come up negative. Just wanted to make sure I'm covering all my bases.  thanks  Doreen Huitron

## 2017-07-01 ENCOUNTER — TRANSFERRED RECORDS (OUTPATIENT)
Dept: FAMILY MEDICINE | Facility: CLINIC | Age: 37
End: 2017-07-01

## 2017-07-12 ENCOUNTER — OFFICE VISIT (OUTPATIENT)
Dept: FAMILY MEDICINE | Facility: CLINIC | Age: 37
End: 2017-07-12

## 2017-07-12 ENCOUNTER — TELEPHONE (OUTPATIENT)
Dept: FAMILY MEDICINE | Facility: CLINIC | Age: 37
End: 2017-07-12

## 2017-07-12 VITALS
HEART RATE: 81 BPM | WEIGHT: 293 LBS | TEMPERATURE: 97.4 F | SYSTOLIC BLOOD PRESSURE: 132 MMHG | OXYGEN SATURATION: 98 % | DIASTOLIC BLOOD PRESSURE: 88 MMHG | BODY MASS INDEX: 61.01 KG/M2

## 2017-07-12 DIAGNOSIS — E66.01 MORBID OBESITY, UNSPECIFIED OBESITY TYPE (H): ICD-10-CM

## 2017-07-12 DIAGNOSIS — F33.1 MAJOR DEPRESSIVE DISORDER, RECURRENT EPISODE, MODERATE (H): ICD-10-CM

## 2017-07-12 DIAGNOSIS — M54.50 CHRONIC BILATERAL LOW BACK PAIN WITHOUT SCIATICA: Primary | ICD-10-CM

## 2017-07-12 DIAGNOSIS — T78.40XD ALLERGIC STATE, SUBSEQUENT ENCOUNTER: ICD-10-CM

## 2017-07-12 DIAGNOSIS — G89.29 CHRONIC BILATERAL LOW BACK PAIN WITHOUT SCIATICA: Primary | ICD-10-CM

## 2017-07-12 DIAGNOSIS — G44.209 TENSION HEADACHE: ICD-10-CM

## 2017-07-12 DIAGNOSIS — G25.81 RESTLESS LEGS SYNDROME (RLS): ICD-10-CM

## 2017-07-12 LAB
ERYTHROCYTE [DISTWIDTH] IN BLOOD BY AUTOMATED COUNT: 12.1 %
HCT VFR BLD AUTO: 41.4 % (ref 35–47)
HEMOGLOBIN: 14.2 G/DL (ref 11.7–15.7)
MCH RBC QN AUTO: 33.6 PG (ref 26–33)
MCHC RBC AUTO-ENTMCNC: 34.3 G/DL (ref 31–36)
MCV RBC AUTO: 98.1 FL (ref 78–100)
PLATELET COUNT - QUEST: 363 10^9/L (ref 150–375)
RBC # BLD AUTO: 4.22 10*12/L (ref 3.8–5.2)
WBC # BLD AUTO: 5.1 10*9/L (ref 4–11)

## 2017-07-12 PROCEDURE — 82728 ASSAY OF FERRITIN: CPT | Mod: 90 | Performed by: PHYSICIAN ASSISTANT

## 2017-07-12 PROCEDURE — 83540 ASSAY OF IRON: CPT | Mod: 90 | Performed by: PHYSICIAN ASSISTANT

## 2017-07-12 PROCEDURE — 99214 OFFICE O/P EST MOD 30 MIN: CPT | Performed by: PHYSICIAN ASSISTANT

## 2017-07-12 PROCEDURE — 83550 IRON BINDING TEST: CPT | Mod: 90 | Performed by: PHYSICIAN ASSISTANT

## 2017-07-12 PROCEDURE — 36415 COLL VENOUS BLD VENIPUNCTURE: CPT | Performed by: PHYSICIAN ASSISTANT

## 2017-07-12 PROCEDURE — 85027 COMPLETE CBC AUTOMATED: CPT | Performed by: PHYSICIAN ASSISTANT

## 2017-07-12 RX ORDER — ESCITALOPRAM OXALATE 20 MG/1
20 TABLET ORAL DAILY
Qty: 90 TABLET | Refills: 3 | Status: SHIPPED | OUTPATIENT
Start: 2017-07-12 | End: 2018-07-18

## 2017-07-12 RX ORDER — BUPROPION HYDROCHLORIDE 150 MG/1
150 TABLET ORAL EVERY MORNING
Qty: 90 TABLET | Refills: 3 | Status: SHIPPED | OUTPATIENT
Start: 2017-07-12 | End: 2018-07-18

## 2017-07-12 ASSESSMENT — ANXIETY QUESTIONNAIRES
6. BECOMING EASILY ANNOYED OR IRRITABLE: SEVERAL DAYS
1. FEELING NERVOUS, ANXIOUS, OR ON EDGE: NOT AT ALL
IF YOU CHECKED OFF ANY PROBLEMS ON THIS QUESTIONNAIRE, HOW DIFFICULT HAVE THESE PROBLEMS MADE IT FOR YOU TO DO YOUR WORK, TAKE CARE OF THINGS AT HOME, OR GET ALONG WITH OTHER PEOPLE: EXTREMELY DIFFICULT
5. BEING SO RESTLESS THAT IT IS HARD TO SIT STILL: SEVERAL DAYS
2. NOT BEING ABLE TO STOP OR CONTROL WORRYING: SEVERAL DAYS
3. WORRYING TOO MUCH ABOUT DIFFERENT THINGS: SEVERAL DAYS

## 2017-07-12 ASSESSMENT — PATIENT HEALTH QUESTIONNAIRE - PHQ9: 5. POOR APPETITE OR OVEREATING: NOT AT ALL

## 2017-07-12 NOTE — NURSING NOTE
Coretta is here for medication check and HA's    Pre-Visit Screening :  Immunizations : up to date  Colonoscopy : NA  Mammogram : NA  Asthma Action Test/Plan : NA  PHQ9/GAD7 :  NA  Pulse - regular  My Chart - accepts    CLASSIFICATION OF OVERWEIGHT AND OBESITY BY BMI                         Obesity Class           BMI(kg/m2)  Underweight                                    < 18.5  Normal                                         18.5-24.9  Overweight                                     25.0-29.9  OBESITY                     I                  30.0-34.9                              II                 35.0-39.9  EXTREME OBESITY             III                >40                             Patient's  BMI Body mass index is 61.01 kg/(m^2).  http://hin.nhlbi.nih.gov/menuplanner/menu.cgi  Questioned patient about current smoking habits.  Pt. has never smoked.

## 2017-07-12 NOTE — PROGRESS NOTES
SUBJECTIVE:                                                    Coretta Huitron is a 36 year old female who presents to clinic today for the following health issues:      Depression and Anxiety Followup    Status since last visit: Stable     See PHQ-9 for current symptoms.  Other associated symptoms: None    Complicating factors:   Significant life event:  No   Current substance abuse:  None  Anxiety or Panic symptoms:  No      Obesity- has been a pt at [facility] [place], [place] - no organic  Cause of weight  Has done well on supplemental program but at plateau  Not tracking calories  Hasn't seen nutritionist.    Due to weight:   Lower back pain - chronic - worse with weight gain.  No radiation  No bowel/bladder loss.   MRI of low back in the past showed no herniated discs    Wt Readings from Last 5 Encounters:   07/12/17 (!) 184.7 kg (407 lb 3.2 oz)   04/19/17 (!) 182.8 kg (403 lb)   04/17/17 (!) 183.1 kg (403 lb 9.6 oz)   04/05/17 (!) 176.9 kg (390 lb)   11/16/16 (!) 176.9 kg (390 lb)     Body mass index is 61.01 kg/(m^2).        RLS - started 3-4 weeks - having trouble falling asleep  Strong family hx  Paternal aunt, cousin and grandmother.     Headaches  Onset: 1 month ago    Description:   Location: Tension in frontal area bilaterally  Character: ache, tightness  Frequency:  Qd or qod - around 1-2pm   Duration:  Will have to go home an nap - then better        Accompanying Signs & Symptoms:  Stiff neck: no  Neck or upper back pain: YES  Fever: no   Sinus pressure: no   Nausea or vomiting: always whole life has nausea  Dizziness: squat to stand occasionally  Numbness: no   Weakness: no   Visual changes: no     History:   Head trauma: no   Family history of migraines: YES- father  Previous tests for headaches: no   Neurologist evaluations: no   Able to do daily activities: not when bad  Wake with a headaches: no   Do headaches wake you up: no   Daily pain medication use: no   Work/school stressors/changes: YES-  work    Precipitating factors:   Does light make it worse: YES  Does sound make it worse: YES    Alleviating factors:  Does sleep help: YES    Therapies Tried and outcome: Iburpfoen - gave her ulcer-like sx.  Omprazole for 2 weeks improved sx.   No NSAIDs for 3 week.  APAP recently started    Also had massages for 3 weeks in a row and HA improved    Tylenol, Benadryl, and Sudafed    Allergies  - Allegra daily    Last eye exam 2017    BP Readings from Last 6 Encounters:   07/12/17 132/88   04/19/17 128/84   04/17/17 120/80   04/05/17 149/79   11/16/16 104/62   10/06/16 114/72         Problem list and histories reviewed & adjusted, as indicated.  Additional history: as documented    Patient Active Problem List   Diagnosis     Irritable bowel syndrome     ACP (advance care planning)     Health Care Home     Morbid obesity with BMI of 50.0-59.9, adult (H)     Obstructive sleep apnea syndrome     Vitamin D deficiency     Tooth decay/ multiple cavities 2013     Knee pain     Major depressive disorder, recurrent episode, moderate (H)     Family history of heart failure     Hypertension     Past Surgical History:   Procedure Laterality Date     BUNIONECTOMY  12/5/2013    Procedure: BUNIONECTOMY;   correct of bunion and bunionette deformation right with oteotomies;  Surgeon: Tanesha Rosa DPM;  Location: [facility] OR     HC CORRECT BUNION,SIMPLE  12/2004    fusion metatarsal     HC EXCIS PRIMARY GANGLION WRIST  1994, 1995    left      HC EXCIS RECURRENT GANGLION WRIST   2009, 2012    right     HC REMOVE TONSILS/ADENOIDS,<13 Y/O      age 8     LAPAROSCOPIC CHOLECYSTECTOMY N/A 11/4/2015    Procedure: LAPAROSCOPIC CHOLECYSTECTOMY;  Surgeon: Alyson Horne MD;  Location: [facility] OR       Social History   Substance Use Topics     Smoking status: Never Smoker     Smokeless tobacco: Never Used     Alcohol use 0.0 oz/week     0 Standard drinks or equivalent per week      Comment: very rare     Family History   Problem Relation  Age of Onset     CANCER Maternal Grandfather      CANCER Paternal Grandfather      Colon Cancer Paternal Grandfather      Thyroid Disease Mother      Hypertension Father      Coronary Artery Disease Father 65     MI -  in 2015     Thyroid Disease Maternal Grandmother      Sleep Apnea Father          Current Outpatient Prescriptions   Medication Sig Dispense Refill     buPROPion (WELLBUTRIN XL) 150 MG 24 hr tablet Take 1 tablet (150 mg) by mouth every morning 30 tablet 0     escitalopram (LEXAPRO) 20 MG tablet Take 1 tablet (20 mg) by mouth daily 30 tablet 0     cholecalciferol (VITAMIN D3) 5000 UNITS CAPS capsule Take by mouth daily       Acetaminophen (TYLENOL PO) Take 500 mg by mouth       Ascorbic Acid (VITAMIN C PO)        Cetirizine HCl (ZYRTEC ALLERGY PO)        ibuprofen (ADVIL,MOTRIN) 600 MG tablet Take 1 tablet (600 mg) by mouth every 6 hours as needed for pain (mild) 30 tablet 0     Allergies   Allergen Reactions     No Known Drug Allergies      BP Readings from Last 3 Encounters:   17 132/88   17 128/84   17 120/80    Wt Readings from Last 3 Encounters:   17 (!) 184.7 kg (407 lb 3.2 oz)   17 (!) 182.8 kg (403 lb)   17 (!) 183.1 kg (403 lb 9.6 oz)                    Reviewed and updated as needed this visit by clinical staff  Tobacco  Problems       Reviewed and updated as needed this visit by Provider         ROS:  Constitutional, HEENT, cardiovascular, pulmonary, gi and gu systems are negative, except as otherwise noted.    OBJECTIVE:     /88 (BP Location: Left arm, Patient Position: Chair, Cuff Size: Adult Large)  Pulse 81  Temp 97.4  F (36.3  C) (Oral)  Wt (!) 184.7 kg (407 lb 3.2 oz)  LMP 07/10/2017  SpO2 98%  BMI 61.01 kg/m2  Body mass index is 61.01 kg/(m^2).        MS: Grossly obese, lumbar spine non-tender to palpation, there is dec. ROM lumbar spine.  There is bilateral paraspinous process tenderness.    C-spine non-tender to palpation  Full  ROM of neck.  There is bilateral tenderness of the trapezius      Mental Status Exam:   Appearance: calm  Grooming: adequately groomed  Demeanor: engaged, cooperative  Affect: normal  Speech: Normal.  Gait:Normal.  Movements: Normal  Form of thought: Logical, Linear and Goal directed  Thought content:  Normal  Insight:Good   Judgment: Good   Cognition: Good         ASSESSMENT/PLAN:     (M54.5,  G89.29) Chronic bilateral low back pain without sciatica  (primary encounter diagnosis)  Plan: Other Specialty Referral      (F33.1) Major depressive disorder, recurrent episode, moderate (H)  Plan: buPROPion (WELLBUTRIN XL) 150 MG 24 hr tablet,         escitalopram (LEXAPRO) 20 MG tablet  Consider restarting therapy    (G44.209) Tension headache  Plan: Other Specialty Referral      (E66.01,  Z68.43) Morbid obesity with BMI of 50.0-59.9, adult (H)    Plan: NUTRITION REFERRAL    - count calories 2 weeks.  - 30 min 3x a week walking    (G25.81) Restless legs syndrome (RLS)  Plan: HEMOGRAM/PLATELET (BFP), Iron and iron binding         capacity, Ferritin, VENOUS COLLECTION  Add OTC dose Magnesium      Allergies - Add Flonase (in am) and Benadryl (pm)      FU via phone mychart 2-4 weeks      BRYAN Olson  [facility]

## 2017-07-12 NOTE — MR AVS SNAPSHOT
After Visit Summary   7/12/2017    Coretta Huitron    MRN: [id number]           Patient Information     Date Of Birth          1980        Visit Information        Provider Department      7/12/2017 12:15 PM Bhavana Joseph PA [facility]        Today's Diagnoses     Chronic bilateral low back pain without sciatica    -  1    Major depressive disorder, recurrent episode, moderate (H)        Tension headache        Restless legs syndrome (RLS)        Allergic state, subsequent encounter        Morbid obesity, unspecified obesity type (H)           Follow-ups after your visit        Additional Services     NUTRITION REFERRAL       Your provider has referred you to: [facility]: [facility] [place] [facility] [phone]   [email or web address]    Please be aware that coverage of these services is subject to the terms and limitations of your health insurance plan.  Call member services at your health plan with any benefit or coverage questions.      Please bring the following with you to your appointment:    (1) This referral request  (2) Any documents given to you regarding this referral  (3) Any specific questions you have about diet and/or food choices            Other Specialty Referral       Your provider has referred you to:   [facility]  [place]  Phone: [phone]  Fax: [phone]    Please call and schedule your own appointment.     Please be aware that coverage of these services is subject to the terms and limitations of your health insurance plan.  Call member services at your health plan with any benefit or coverage questions.      Please inform our clinic Referral Specialist of any tests that you may have already completed.    Please bring the following with you to your appointment:    (1) Any X-Rays, CTs or MRIs which have been performed.  Contact the facility where they were done to  arrange for  prior to your scheduled appointment.  Any new CT, MRI or other procedures ordered by your specialist must be performed at a [place] facility or coordinated by your clinic's referral office.    (2) List of current medications   (3) This referral request   (4) Any documents/labs given to you for this referral                  Who to contact     If you have questions or need follow up information about today's clinic visit or your schedule please contact [facility] directly at [phone].  Normal or non-critical lab and imaging results will be communicated to you by [employer], letter or phone within 4 business days after the clinic has received the results. If you do not hear from us within 7 days, please contact the clinic through Bizot or phone. If you have a critical or abnormal lab result, we will notify you by phone as soon as possible.  Submit refill requests through Klangoo or call your pharmacy and they will forward the refill request to us. Please allow 3 business days for your refill to be completed.          Additional Information About Your Visit        Klangoo Information     K[employer]oo gives you secure access to your electronic health record. If you see a primary care provider, you can also send messages to your care team and make appointments. If you have questions, please call your primary care clinic.  If you do not have a primary care provider, please call [phone] and they will assist you.        Care EveryWhere ID     This is your Care EveryWhere ID. This could be used by other organizations to access your [place] medical records  [id number]        Your Vitals Were     Pulse Temperature Last Period Pulse Oximetry BMI (Body Mass Index)       81 97.4  F (36.3  C) (Oral) 07/10/2017 98% 61.01 kg/m2        Blood Pressure from Last 3 Encounters:   07/12/17 132/88   04/19/17 128/84   04/17/17 120/80    Weight from Last 3 Encounters:   07/12/17 (!) 184.7 kg  (407 lb 3.2 oz)   04/19/17 (!) 182.8 kg (403 lb)   04/17/17 (!) 183.1 kg (403 lb 9.6 oz)              We Performed the Following     Ferritin     HEMOGRAM/PLATELET (BFP)     Iron and iron binding capacity     NUTRITION REFERRAL     Other Specialty Referral     VENOUS COLLECTION          Where to get your medicines      These medications were sent to [facility] [place], [place] - [address]  [address] [facility] [address]     Phone:  [phone]     buPROPion 150 MG 24 hr tablet    escitalopram 20 MG tablet          Primary Care Provider Office Phone # Fax #    BRYAN Olson [phone]       [facility]  [address]  [facility] [address]        Equal Access to Services     LUCAS AVILA : Hadii estee plasencia hadasho Soomaali, waaxda luqadaha, qaybta kaalmada adeegyada, waxay senait harris . So [facility] [phone].    ATENCIÓN: Si habla español, tiene a cuenca disposición servicios gratuitos de asistencia lingüística. Mikael al [phone].    We comply with applicable federal civil rights laws and [place] laws. We do not discriminate on the basis of race, color, national origin, age, disability sex, sexual orientation or gender identity.            Thank you!     Thank you for choosing [facility]  for your care. Our goal is always to provide you with excellent care. Hearing back from our patients is one way we can continue to improve our services. Please take a few minutes to complete the written survey that you may receive in the mail after your visit with us. Thank you!             Your Updated Medication List - Protect others around you: Learn how to safely use, store and throw away your medicines at www.disposemymeds.org.          This list is accurate as of: 7/12/17  2:37 PM.  Always use your most recent med list.                   Brand Name Dispense Instructions for use Diagnosis    buPROPion  150 MG 24 hr tablet    WELLBUTRIN XL    90 tablet    Take 1 tablet (150 mg) by mouth every morning    Major depressive disorder, recurrent episode, moderate (H)       cholecalciferol 5000 UNITS Caps capsule    vitamin D3     Take by mouth daily        escitalopram 20 MG tablet    LEXAPRO    90 tablet    Take 1 tablet (20 mg) by mouth daily    Major depressive disorder, recurrent episode, moderate (H)       ibuprofen 600 MG tablet    ADVIL/MOTRIN    30 tablet    Take 1 tablet (600 mg) by mouth every 6 hours as needed for pain (mild)    Chronic cholecystitis       TYLENOL PO      Take 500 mg by mouth    Pneumonia of right middle lobe due to infectious organism (H)       VITAMIN C PO           ZYRTEC ALLERGY PO

## 2017-07-13 LAB
% SATURATION - QUEST: 46 % (CALC) (ref 11–50)
FERRITIN SERPL-MCNC: 29 NG/ML (ref 10–154)
IRON: 158 MCG/DL (ref 40–190)
TIBC - QUEST: 341 MCG/DL (CALC) (ref 250–450)

## 2017-07-13 ASSESSMENT — PATIENT HEALTH QUESTIONNAIRE - PHQ9: SUM OF ALL RESPONSES TO PHQ QUESTIONS 1-9: 5

## 2017-07-17 ENCOUNTER — TRANSFERRED RECORDS (OUTPATIENT)
Dept: FAMILY MEDICINE | Facility: CLINIC | Age: 37
End: 2017-07-17

## 2017-07-23 PROBLEM — E28.2 PCOS (POLYCYSTIC OVARIAN SYNDROME): Status: ACTIVE | Noted: 2017-07-23

## 2017-08-16 ENCOUNTER — TRANSFERRED RECORDS (OUTPATIENT)
Dept: FAMILY MEDICINE | Facility: CLINIC | Age: 37
End: 2017-08-16

## 2017-08-19 ENCOUNTER — MYC MEDICAL ADVICE (OUTPATIENT)
Dept: FAMILY MEDICINE | Facility: CLINIC | Age: 37
End: 2017-08-19

## 2017-08-21 ENCOUNTER — OFFICE VISIT (OUTPATIENT)
Dept: FAMILY MEDICINE | Facility: CLINIC | Age: 37
End: 2017-08-21

## 2017-08-21 VITALS
HEIGHT: 68 IN | HEART RATE: 72 BPM | TEMPERATURE: 98 F | BODY MASS INDEX: 44.41 KG/M2 | RESPIRATION RATE: 16 BRPM | WEIGHT: 293 LBS | SYSTOLIC BLOOD PRESSURE: 122 MMHG | OXYGEN SATURATION: 97 % | DIASTOLIC BLOOD PRESSURE: 82 MMHG

## 2017-08-21 DIAGNOSIS — J01.90 ACUTE SINUSITIS WITH SYMPTOMS > 10 DAYS: Primary | ICD-10-CM

## 2017-08-21 DIAGNOSIS — T78.40XD ALLERGIC STATE, SUBSEQUENT ENCOUNTER: ICD-10-CM

## 2017-08-21 PROCEDURE — 99213 OFFICE O/P EST LOW 20 MIN: CPT | Performed by: FAMILY MEDICINE

## 2017-08-21 RX ORDER — AMOXICILLIN 875 MG
875 TABLET ORAL 2 TIMES DAILY
Qty: 20 TABLET | Refills: 0 | Status: SHIPPED | OUTPATIENT
Start: 2017-08-21 | End: 2017-10-25

## 2017-08-21 NOTE — NURSING NOTE
Patient is here for a cold that has now gone on for a week - she is now having facial pain and blowing out green stuff.  Pulse - regular  My Chart - accepts    CLASSIFICATION OF OVERWEIGHT AND OBESITY BY BMI                         Obesity Class           BMI(kg/m2)  Underweight                                    < 18.5  Normal                                         18.5-24.9  Overweight                                     25.0-29.9  OBESITY                     I                  30.0-34.9                              II                 35.0-39.9  EXTREME OBESITY             III                >40                             Patient's  BMI Body mass index is 62.19 kg/(m^2).  http://hin.nhlbi.nih.gov/menuplanner/menu.cgi  Questioned patient about current smoking habits.  Pt. has never smoked.

## 2017-08-21 NOTE — PROGRESS NOTES
SUBJECTIVE: 36 year old female complaining of sore throat followed by fatigue and nasal congestion for 8 day(s).   The patient describes taking Flonase and allegra all summer not well controlled. Has been boggy lately   The patient denies a history of rash, Gi symptoms or SOB.   Smoking history: No.   Relevant past medical history: positive for PCOS, irritable colon and depression.    OBJECTIVE: The patient appears healthy, alert, no distress, cooperative, smiling and over weight.   EARS: negative  NOSE/SINUS: positive findings: mucosa erythematous and swollen, purulent rhinorrhea   THROAT: normal and post nasal drainage   NECK:Neck supple. No adenopathy. Thyroid symmetric, normal size,, Carotids without bruits.   CHEST: Clear    ASSESSMENT: (J01.90) Acute sinusitis with symptoms > 10 days  (primary encounter diagnosis)  Plan: amoxicillin (AMOXIL) 875 MG tablet        Sinus pressure is primarily a problem of drainage.  You can best help your body clear the sinus secretions and pressure by opening up the natural passageways which are often blocked by viral colds and allergies.      Short courses of a nasal decongestant spray (Afrin or Neosinephrine) are one of the most effective tools in opening sinus drainage passageways.  Their use should be restricted to 3 days though due to the high risk of nasal addiction.  Pseudoephedrine or phenylephrine (Sudafed) is often helpful but it can cause elevations in blood pressure and insomnia.     Sometimes a nasal saline spray will help rinse out the nasal passages and feel good.     Guaifenesin (Robitussin or Mucinex) helps loosen secretions and often help make the mucous more liquid and easier to clear.    For pain and fevers, acetaminophen (Tylenol) is most appropriate.  Ibuprofen (Advil) or naproxen (Aleve) are useful too and last longer but they can cause elevation of blood pressure or stomach problems.    Antihistamines (Benadryl, Dimetapp, etc.) cause sedation, confusion,  bowel and urinary abnormalities and are of little use for infectious causes of cough and nasal congestion.  Their use should be reserved for allergic symptoms.    The body needs to be treated well in order to help heal itself.  Rest as needed.  It is ok to reduce food intake if appetite is poor but it is quite important to maintain/increase fluid intake.  Sinus pressure and infections usually go away on their own with appropriate care.  If symptoms worsen or persist beyond 10 days, an antibiotic might be worth trying to treat a possible bacterial component.      (T78.40XD) Allergic state, subsequent encounter  Plan: I have reviewed the patient's medical history in detail and updated the computerized patient record.

## 2017-08-21 NOTE — PATIENT INSTRUCTIONS
Sinus pressure is primarily a problem of drainage.  You can best help your body clear the sinus secretions and pressure by opening up the natural passageways which are often blocked by viral colds and allergies.      Short courses of a nasal decongestant spray (Afrin or Neosinephrine) are one of the most effective tools in opening sinus drainage passageways.  Their use should be restricted to 3 days though due to the high risk of nasal addiction.  Pseudoephedrine or phenylephrine (Sudafed) is often helpful but it can cause elevations in blood pressure and insomnia.     Sometimes a nasal saline spray will help rinse out the nasal passages and feel good.     Guaifenesin (Robitussin or Mucinex) helps loosen secretions and often help make the mucous more liquid and easier to clear.    For pain and fevers, acetaminophen (Tylenol) is most appropriate.  Ibuprofen (Advil) or naproxen (Aleve) are useful too and last longer but they can cause elevation of blood pressure or stomach problems.    Antihistamines (Benadryl, Dimetapp, etc.) cause sedation, confusion, bowel and urinary abnormalities and are of little use for infectious causes of cough and nasal congestion.  Their use should be reserved for allergic symptoms.    The body needs to be treated well in order to help heal itself.  Rest as needed.  It is ok to reduce food intake if appetite is poor but it is quite important to maintain/increase fluid intake.  Sinus pressure and infections usually go away on their own with appropriate care.  If symptoms worsen or persist beyond 10 days, an antibiotic might be worth trying to treat a possible bacterial component.

## 2017-08-21 NOTE — TELEPHONE ENCOUNTER
From: Coretta Huitron  To: Bhavana Joseph PA  Sent: 8/19/2017 11:06 PM CDT  Subject: Updates about my health    I am feeling exhausted, feeling pressure in my sinuses, and today when blowing my nose am getting green. Thinking I have a sinus infection. What would you recommend?

## 2017-08-21 NOTE — MR AVS SNAPSHOT
After Visit Summary   8/21/2017    Coretta Huitron    MRN: [id number]           Patient Information     Date Of Birth          1980        Visit Information        Provider Department      8/21/2017 1:15 PM Aspen Walton MD [facility]        Today's Diagnoses     Acute sinusitis with symptoms > 10 days    -  1    Allergic state, subsequent encounter          Care Instructions    Sinus pressure is primarily a problem of drainage.  You can best help your body clear the sinus secretions and pressure by opening up the natural passageways which are often blocked by viral colds and allergies.      Short courses of a nasal decongestant spray (Afrin or Neosinephrine) are one of the most effective tools in opening sinus drainage passageways.  Their use should be restricted to 3 days though due to the high risk of nasal addiction.  Pseudoephedrine or phenylephrine (Sudafed) is often helpful but it can cause elevations in blood pressure and insomnia.     Sometimes a nasal saline spray will help rinse out the nasal passages and feel good.     Guaifenesin (Robitussin or Mucinex) helps loosen secretions and often help make the mucous more liquid and easier to clear.    For pain and fevers, acetaminophen (Tylenol) is most appropriate.  Ibuprofen (Advil) or naproxen (Aleve) are useful too and last longer but they can cause elevation of blood pressure or stomach problems.    Antihistamines (Benadryl, Dimetapp, etc.) cause sedation, confusion, bowel and urinary abnormalities and are of little use for infectious causes of cough and nasal congestion.  Their use should be reserved for allergic symptoms.    The body needs to be treated well in order to help heal itself.  Rest as needed.  It is ok to reduce food intake if appetite is poor but it is quite important to maintain/increase fluid intake.  Sinus pressure and infections usually go away on their own with appropriate care.  If symptoms  "worsen or persist beyond 10 days, an antibiotic might be worth trying to treat a possible bacterial component.            Follow-ups after your visit        Follow-up notes from your care team     Return if symptoms worsen or fail to improve.      Your next 10 appointments already scheduled     Aug 23, 2017  1:30 PM CDT   Diabetic Education with [place] DIABETIC ED RESOURCE   [facility] ([facility])    [address]  [facility] [address] [phone]              Who to contact     If you have questions or need follow up information about today's clinic visit or your schedule please contact [facility] directly at [phone].  Normal or non-critical lab and imaging results will be communicated to you by [employer], letter or phone within 4 business days after the clinic has received the results. If you do not hear from us within 7 days, please contact the clinic through [employer] or phone. If you have a critical or abnormal lab result, we will notify you by phone as soon as possible.  Submit refill requests through [employer] or call your pharmacy and they will forward the refill request to us. Please allow 3 business days for your refill to be completed.          Additional Information About Your Visit        MyChart Information     [employer] gives you secure access to your electronic health record. If you see a primary care provider, you can also send messages to your care team and make appointments. If you have questions, please call your primary care clinic.  If you do not have a primary care provider, please call [phone] and they will assist you.        Care EveryWhere ID     This is your Care EveryWhere ID. This could be used by other organizations to access your [facility] medical records  [id number]        Your Vitals Were     Pulse Temperature Respirations Height Last Period Pulse Oximetry    72 98  F (36.7  C) (Oral) 16 1.734 m (5' 8.25\") " 07/24/2017 97%    Breastfeeding? BMI (Body Mass Index)                No 62.19 kg/m2           Blood Pressure from Last 3 Encounters:   08/21/17 122/82   07/12/17 132/88   04/19/17 128/84    Weight from Last 3 Encounters:   08/21/17 (!) 186.9 kg (412 lb)   07/12/17 (!) 184.7 kg (407 lb 3.2 oz)   04/19/17 (!) 182.8 kg (403 lb)              Today, you had the following     No orders found for display         Today's Medication Changes          These changes are accurate as of: 8/21/17  2:20 PM.  If you have any questions, ask your nurse or doctor.               Start taking these medicines.        Dose/Directions    amoxicillin 875 MG tablet   Commonly known as:  AMOXIL   Used for:  Acute sinusitis with symptoms > 10 days   Started by:  Aspen Walton MD        Dose:  875 mg   Take 1 tablet (875 mg) by mouth 2 times daily   Quantity:  20 tablet   Refills:  0            Where to get your medicines      These medications were sent to [facility] [id number] [place] [address]  [id number] [place] [address]     Phone:  [phone]     amoxicillin 875 MG tablet                Primary Care Provider Office Phone # Fax #    BRYAN Olson [phone] [address] [facility] [address]        Equal Access to Services     LUCAS AVILA AH: Hadii estee plasencia hadasho Sokevin, waaxda luqadaha, qaybta kaalmada adeegyada, farhana harris . So [facility] [phone].    ATENCIÓN: Si habla español, tiene a cuenca disposición servicios gratuitos de asistencia lingüística. Llame al [phone].    We comply with applicable federal civil rights laws and [place] laws. We do not discriminate on the basis of race, color, national origin, age, disability sex, sexual orientation or gender identity.            Thank you!     Thank you for choosing [facility]  for your care. Our goal is always to provide you with excellent care.  Hearing back from our patients is one way we can continue to improve our services. Please take a few minutes to complete the written survey that you may receive in the mail after your visit with us. Thank you!             Your Updated Medication List - Protect others around you: Learn how to safely use, store and throw away your medicines at www.disposemymeds.org.          This list is accurate as of: 8/21/17  2:20 PM.  Always use your most recent med list.                   Brand Name Dispense Instructions for use Diagnosis    amoxicillin 875 MG tablet    AMOXIL    20 tablet    Take 1 tablet (875 mg) by mouth 2 times daily    Acute sinusitis with symptoms > 10 days       buPROPion 150 MG 24 hr tablet    WELLBUTRIN XL    90 tablet    Take 1 tablet (150 mg) by mouth every morning    Major depressive disorder, recurrent episode, moderate (H)       cholecalciferol 5000 UNITS Caps capsule    vitamin D3     Take by mouth daily        escitalopram 20 MG tablet    LEXAPRO    90 tablet    Take 1 tablet (20 mg) by mouth daily    Major depressive disorder, recurrent episode, moderate (H)       ibuprofen 600 MG tablet    ADVIL/MOTRIN    30 tablet    Take 1 tablet (600 mg) by mouth every 6 hours as needed for pain (mild)    Chronic cholecystitis       TYLENOL PO      Take 500 mg by mouth    Pneumonia of right middle lobe due to infectious organism (H)       VITAMIN C PO           ZYRTEC ALLERGY PO

## 2017-08-23 ENCOUNTER — ALLIED HEALTH/NURSE VISIT (OUTPATIENT)
Dept: EDUCATION SERVICES | Facility: CLINIC | Age: 37
End: 2017-08-23
Attending: PHYSICIAN ASSISTANT
Payer: COMMERCIAL

## 2017-08-23 VITALS — WEIGHT: 293 LBS | BODY MASS INDEX: 62.28 KG/M2

## 2017-08-23 DIAGNOSIS — E28.2 PCOS (POLYCYSTIC OVARIAN SYNDROME): Primary | ICD-10-CM

## 2017-08-23 DIAGNOSIS — E66.01 MORBID OBESITY, UNSPECIFIED OBESITY TYPE (H): ICD-10-CM

## 2017-08-23 PROCEDURE — 97802 MEDICAL NUTRITION INDIV IN: CPT

## 2017-08-23 NOTE — PATIENT INSTRUCTIONS
1. My Fitness Pal ~2000 calories  2. Goal to eliminate pop  3. Looking for 2-3 lbs decrease per week  4. [facility] or [facility]

## 2017-08-23 NOTE — PROGRESS NOTES
Medical Nutrition Therapy  Visit Type:Initial assessment and intervention    Coretta Huitron presents today for MNT and education related to weight management.   She is accompanied by self.     ASSESSMENT:   Patient comments/concerns relating to nutrition: long hx of obesity (familial)    NUTRITION HISTORY:    Breakfast: [employer] - burrito or breakfast sandwich, hashbrown, large soda OR Isagenix shake (250 kcal)  Lunch:  pbj sandwich or 2 cheese sticks or   Dinner: 2 breaded chicken sanna, 1/2 cup rice OR 6 pizza slices OR taco meal  Snacks: Isogenix protein bar or cheese sticks or HS - chips, breakfast bar, shake  Beverages: 16 oz regular soda, water, shakes made w/ water    Misses meals? Tends to graze  Eats out:  5+x/week - [employer], [employer] (nuggets or burger)     Previous diet education:  Yes     Food allergies/intolerances: no - doesn't feel well with milk    Diet is high in: calories  Diet is low in: fiber, fruits and vegetables    EXERCISE: PT program 2x/week + exercises/stretches at home    SOCIO/ECONOMIC:   Lives with: not assessed    MEDICATIONS:  Current Outpatient Prescriptions   Medication     amoxicillin (AMOXIL) 875 MG tablet     buPROPion (WELLBUTRIN XL) 150 MG 24 hr tablet     escitalopram (LEXAPRO) 20 MG tablet     cholecalciferol (VITAMIN D3) 5000 UNITS CAPS capsule     Acetaminophen (TYLENOL PO)     Ascorbic Acid (VITAMIN C PO)     Cetirizine HCl (ZYRTEC ALLERGY PO)     ibuprofen (ADVIL,MOTRIN) 600 MG tablet     No current facility-administered medications for this visit.        LABS:  Last Basic Metabolic Panel:  Lab Results   Component Value Date     04/05/2017      Lab Results   Component Value Date    POTASSIUM 4.4 04/05/2017     Lab Results   Component Value Date    CHLORIDE 105 04/05/2017     Lab Results   Component Value Date    MELYSSA 8.7 04/05/2017     Lab Results   Component Value Date    CO2 24 04/05/2017     Lab Results   Component Value Date    BUN 13 04/05/2017     Lab Results    Component Value Date    CR 0.61 04/05/2017     Lab Results   Component Value Date     04/05/2017       ANTHROPOMETRICS:  Vitals: Wt (!) 187.2 kg (412 lb 9.6 oz)  LMP 07/24/2017  BMI 62.28 kg/m2  Body mass index is 62.28 kg/(m^2).      Wt Readings from Last 5 Encounters:   08/23/17 (!) 187.2 kg (412 lb 9.6 oz)   08/21/17 (!) 186.9 kg (412 lb)   07/12/17 (!) 184.7 kg (407 lb 3.2 oz)   04/19/17 (!) 182.8 kg (403 lb)   04/17/17 (!) 183.1 kg (403 lb 9.6 oz)       Weight Change: weight increased 20 lbs x 1 year    ESTIMATED KCAL REQUIREMENTS:  ~2000 kcal per day    NUTRITION DIAGNOSIS: Overweight/Obesity related to food choices and limited activity as evidenced by BMI >60    NUTRITION INTERVENTION:  Education given to support: general nutrition guidelines, weight reduction, consistent meals, carb counting, exercise, dining out/special occasions, fiber, behavior modification and portion control  Education Materials Provided: My Plate Planner/Choose My Plate    PATIENT'S BEHAVIOR CHANGE GOALS:   See Patient Instructions for patient stated behavior change goals. AVS was printed and given to patient at today's appointment.    MONITOR / EVALUATE:  RD will monitor/evaluate:  Progress toward meeting stated nutrition-related goals  Weight change    FOLLOW-UP:  Follow-up appointment scheduled on 9/20.     CARLOS Pan CDE    Time spent in minutes: 60  Encounter: Individual

## 2017-08-23 NOTE — MR AVS SNAPSHOT
After Visit Summary   8/23/2017    Coretta Huitron    MRN: [id number]           Patient Information     Date Of Birth          1980        Visit Information        Provider Department      8/23/2017 1:30 PM [place] DIABETIC ED RESOURCE [facility]        Care Instructions    1. My Fitness Pal ~2000 calories  2. Goal to eliminate pop  3. Looking for 2-3 lbs decrease per week  4. [facility] or [facility]          Follow-ups after your visit        Your next 10 appointments already scheduled     Sep 20, 2017  8:30 AM CDT   Diabetic Education with [place] DIABETIC ED RESOURCE   [facility]    [address]  [facility] [address] [phone]              Who to contact     If you have questions or need follow up information about today's clinic visit or your schedule please contact [facility] directly at [phone].  Normal or non-critical lab and imaging results will be communicated to you by [employer], letter or phone within 4 business days after the clinic has received the results. If you do not hear from us within 7 days, please contact the clinic through [employer] or phone. If you have a critical or abnormal lab result, we will notify you by phone as soon as possible.  Submit refill requests through [employer] or call your pharmacy and they will forward the refill request to us. Please allow 3 business days for your refill to be completed.          Additional Information About Your Visit        MyChart Information     [employer] gives you secure access to your electronic health record. If you see a primary care provider, you can also send messages to your care team and make appointments. If you have questions, please call your primary care clinic.  If you do not have a primary care provider, please call [phone] and they will assist you.        Care EveryWhere ID     This is your Care EveryWhere ID. This  could be used by other organizations to access your [facility] medical records  [id number]        Your Vitals Were     Last Period BMI (Body Mass Index)                07/24/2017 62.28 kg/m2           Blood Pressure from Last 3 Encounters:   08/21/17 122/82   07/12/17 132/88   04/19/17 128/84    Weight from Last 3 Encounters:   08/23/17 (!) 187.2 kg (412 lb 9.6 oz)   08/21/17 (!) 186.9 kg (412 lb)   07/12/17 (!) 184.7 kg (407 lb 3.2 oz)              Today, you had the following     No orders found for display       Primary Care Provider Office Phone # Fax #    BRYAN Olson [phone] [address]  [facility] [address]        Equal Access to Services     [facility] : Hadii estee plasencia hadasho Soomaali, waaxda luqadaha, qaybta kaalmada adeegyada, farhana sapp hayalecia harris . So [facility] [phone].    ATENCIÓN: Si habla español, tiene a cuenca disposición servicios gratuitos de asistencia lingüística. Llame al [phone].    We comply with applicable federal civil rights laws and [place] laws. We do not discriminate on the basis of race, color, national origin, age, disability sex, sexual orientation or gender identity.            Thank you!     Thank you for choosing [facility]  for your care. Our goal is always to provide you with excellent care. Hearing back from our patients is one way we can continue to improve our services. Please take a few minutes to complete the written survey that you may receive in the mail after your visit with us. Thank you!             Your Updated Medication List - Protect others around you: Learn how to safely use, store and throw away your medicines at www.disposemymeds.org.          This list is accurate as of: 8/23/17  2:28 PM.  Always use your most recent med list.                   Brand Name Dispense Instructions for use Diagnosis    amoxicillin 875 MG tablet    AMOXIL    20 tablet    Take 1 tablet (875 mg) by  mouth 2 times daily    Acute sinusitis with symptoms > 10 days       buPROPion 150 MG 24 hr tablet    WELLBUTRIN XL    90 tablet    Take 1 tablet (150 mg) by mouth every morning    Major depressive disorder, recurrent episode, moderate (H)       cholecalciferol 5000 UNITS Caps capsule    vitamin D3     Take by mouth daily        escitalopram 20 MG tablet    LEXAPRO    90 tablet    Take 1 tablet (20 mg) by mouth daily    Major depressive disorder, recurrent episode, moderate (H)       ibuprofen 600 MG tablet    ADVIL/MOTRIN    30 tablet    Take 1 tablet (600 mg) by mouth every 6 hours as needed for pain (mild)    Chronic cholecystitis       TYLENOL PO      Take 500 mg by mouth    Pneumonia of right middle lobe due to infectious organism (H)       VITAMIN C PO           ZYRTEC ALLERGY PO

## 2017-09-01 ENCOUNTER — HEALTH MAINTENANCE LETTER (OUTPATIENT)
Age: 37
End: 2017-09-01

## 2017-09-20 ENCOUNTER — TRANSFERRED RECORDS (OUTPATIENT)
Dept: FAMILY MEDICINE | Facility: CLINIC | Age: 37
End: 2017-09-20

## 2017-10-25 ENCOUNTER — OFFICE VISIT (OUTPATIENT)
Dept: FAMILY MEDICINE | Facility: CLINIC | Age: 37
End: 2017-10-25

## 2017-10-25 VITALS
OXYGEN SATURATION: 98 % | WEIGHT: 293 LBS | HEART RATE: 76 BPM | DIASTOLIC BLOOD PRESSURE: 80 MMHG | HEIGHT: 68 IN | BODY MASS INDEX: 44.41 KG/M2 | TEMPERATURE: 98.5 F | SYSTOLIC BLOOD PRESSURE: 120 MMHG

## 2017-10-25 DIAGNOSIS — R05.9 COUGH: ICD-10-CM

## 2017-10-25 DIAGNOSIS — J01.00 ACUTE NON-RECURRENT MAXILLARY SINUSITIS: Primary | ICD-10-CM

## 2017-10-25 PROCEDURE — 99213 OFFICE O/P EST LOW 20 MIN: CPT | Performed by: PHYSICIAN ASSISTANT

## 2017-10-25 RX ORDER — CODEINE PHOSPHATE AND GUAIFENESIN 10; 100 MG/5ML; MG/5ML
1 SOLUTION ORAL EVERY 4 HOURS PRN
Qty: 120 ML | Refills: 0 | Status: SHIPPED | OUTPATIENT
Start: 2017-10-25 | End: 2019-05-21

## 2017-10-25 NOTE — PROGRESS NOTES
SUBJECTIVE:                                                    Coretta Huitron is a 37 year old female who presents to clinic today for the following health issues:    Coretta is here with URI Sx for one week.  Sx include laryngitis, cough, nasal drainage, sore throat,  headache, fatigue.  Low grade fever this weekend.  Cough started Monday.  Did feel SOB last week.    OTC: Robitussin, Guaifenesin, Ibuprofen, Sudafed.     However has had tachycardia with Sudafed in the past so she uses this sparingly.     2 weeks ago was with godchildren, who have been ill with URI sx, ear infection.         BP Readings from Last 3 Encounters:   10/25/17 120/80   08/21/17 122/82   07/12/17 132/88    Wt Readings from Last 3 Encounters:   10/25/17 (!) 191.9 kg (423 lb)   08/23/17 (!) 187.2 kg (412 lb 9.6 oz)   08/21/17 (!) 186.9 kg (412 lb)            Patient Active Problem List   Diagnosis     Irritable bowel syndrome     ACP (advance care planning)     Health Care Home     Obstructive sleep apnea syndrome     Vitamin D deficiency     Tooth decay/ multiple cavities 2013     Knee pain     Major depressive disorder, recurrent episode, moderate (H)     Family history of heart failure     Hypertension     Allergic state, subsequent encounter     Morbid obesity, unspecified obesity type (H)     PCOS (polycystic ovarian syndrome) - per [facility]     Past Surgical History:   Procedure Laterality Date     BUNIONECTOMY  12/5/2013    Procedure: BUNIONECTOMY;   correct of bunion and bunionette deformation right with oteotomies;  Surgeon: Tanesha Rosa DPM;  Location: [facility] OR     HC CORRECT BUNION,SIMPLE  12/2004    fusion metatarsal     HC EXCIS PRIMARY GANGLION WRIST  1994, 1995    left      HC EXCIS RECURRENT GANGLION WRIST   2009, 2012    right     HC REMOVE TONSILS/ADENOIDS,<13 Y/O      age 8     LAPAROSCOPIC CHOLECYSTECTOMY N/A 11/4/2015    Procedure: LAPAROSCOPIC CHOLECYSTECTOMY;  Surgeon: Alyson Horne MD;  Location: [facility] OR     "   Social History   Substance Use Topics     Smoking status: Never Smoker     Smokeless tobacco: Never Used     Alcohol use 0.0 oz/week     0 Standard drinks or equivalent per week      Comment: very rare     Family History   Problem Relation Age of Onset     CANCER Maternal Grandfather      CANCER Paternal Grandfather      Colon Cancer Paternal Grandfather      Thyroid Disease Mother      Hypertension Father      Coronary Artery Disease Father 65     MI -  in      Sleep Apnea Father      Thyroid Disease Maternal Grandmother          Current Outpatient Prescriptions   Medication Sig Dispense Refill     guaiFENesin-codeine (ROBITUSSIN AC) 100-10 MG/5ML SOLN solution Take 5 mLs by mouth every 4 hours as needed for cough 120 mL 0     buPROPion (WELLBUTRIN XL) 150 MG 24 hr tablet Take 1 tablet (150 mg) by mouth every morning 90 tablet 3     escitalopram (LEXAPRO) 20 MG tablet Take 1 tablet (20 mg) by mouth daily 90 tablet 3     cholecalciferol (VITAMIN D3) 5000 UNITS CAPS capsule Take by mouth daily       Acetaminophen (TYLENOL PO) Take 500 mg by mouth       Ascorbic Acid (VITAMIN C PO)        Cetirizine HCl (ZYRTEC ALLERGY PO)        ibuprofen (ADVIL,MOTRIN) 600 MG tablet Take 1 tablet (600 mg) by mouth every 6 hours as needed for pain (mild) 30 tablet 0       Allergies   Allergen Reactions     No Known Drug Allergies        OBJECTIVE:                                                    /80 (BP Location: Right arm, Cuff Size: Adult Large)  Pulse 76  Temp 98.5  F (36.9  C) (Oral)  Ht 1.734 m (5' 8.25\")  Wt (!) 191.9 kg (423 lb)  LMP 10/12/2017 (Approximate)  SpO2 98%  BMI 63.85 kg/m2 Body mass index is 63.85 kg/(m^2).     GENERAL: alert and no distress  HEAD: Normocephalic, atraumatic  EYES: Eyes grossly normal to inspection, extraocular movements - intact  EARS:   Right: External ear and canal normal, TM normal  Left: External ear and canal normal, TM normal  NOSE: No discharge noted  MOUTH/THROAT: " no ulcers, no lesions, pharynx non-erythematous, no exudates present, tonsils without hypertrophy, mucous membranes moist.   NECK: no tenderness, no adenopathy, no asymmetry, no masses, no stiffness; thyroid- normal to palpation  RESP: lungs clear to auscultation - no crackles, no rhonchi, no wheezes  CV: regular rates and rhythm, normal S1 S2, no S3 or S4 and no murmur, no click or rub -         ASSESSMENT/PLAN:                                                        ICD-10-CM    1. Acute non-recurrent maxillary sinusitis J01.00 guaiFENesin-codeine (ROBITUSSIN AC) 100-10 MG/5ML SOLN solution   2. Cough R05        Symptomatic cares advised including Increase fluids, rest, acetominophen or ibuprofen prn if not contraindicated.     If applicable advised the following:  Sore throat:  sugar free throat lozenges, sprays, Chloraseptic lozenges, salt water gargles  Cough: sugar free cough drops, honey, Delsym bid.  If Robitussin AC was prescribed, I discussed that this medication is to be used at night time only and not with Delysm. Not to take with alcohol, no driving.   Sinus: Warm packs on face, Vicks Vapo Rub,     Pt to call next week if sinusitis continues - Augmentin advised    Bhavana Joseph PA-C  [facility]

## 2017-10-25 NOTE — NURSING NOTE
Patient is here for a cold that started about a week ago - low fever and green discharge -? Sinus infection.  Pre-Visit Screening :  Immunizations : up to date    Colonoscopy : Not needed at this age  Mammogram : Not needed at this age  Asthma Action Test/Plan : na  PHQ9/GAD7 :  Na  Pulse - regular  My Chart - accepts    CLASSIFICATION OF OVERWEIGHT AND OBESITY BY BMI                         Obesity Class           BMI(kg/m2)  Underweight                                    < 18.5  Normal                                         18.5-24.9  Overweight                                     25.0-29.9  OBESITY                     I                  30.0-34.9                              II                 35.0-39.9  EXTREME OBESITY             III                >40                             Patient's  BMI Body mass index is 63.85 kg/(m^2).  http://hin.nhlbi.nih.gov/menuplanner/menu.cgi  Questioned patient about current smoking habits.  Pt. has never smoked.

## 2017-10-25 NOTE — MR AVS SNAPSHOT
"              After Visit Summary   10/25/2017    Coretta Huitron    MRN: [id number]           Patient Information     Date Of Birth          1980        Visit Information        Provider Department      10/25/2017 10:00 AM Bhavana Joseph PA [facility]        Today's Diagnoses     Acute non-recurrent maxillary sinusitis    -  1    Cough           Follow-ups after your visit        Who to contact     If you have questions or need follow up information about today's clinic visit or your schedule please contact [facility] directly at [phone].  Normal or non-critical lab and imaging results will be communicated to you by MyChart, letter or phone within 4 business days after the clinic has received the results. If you do not hear from us within 7 days, please contact the clinic through [employer] or phone. If you have a critical or abnormal lab result, we will notify you by phone as soon as possible.  Submit refill requests through [employer] or call your pharmacy and they will forward the refill request to us. Please allow 3 business days for your refill to be completed.          Additional Information About Your Visit        MyChart Information     [employer] gives you secure access to your electronic health record. If you see a primary care provider, you can also send messages to your care team and make appointments. If you have questions, please call your primary care clinic.  If you do not have a primary care provider, please call [phone] and they will assist you.        Care EveryWhere ID     This is your Care EveryWhere ID. This could be used by other organizations to access your [place] medical records  [id number]        Your Vitals Were     Pulse Temperature Height Last Period Pulse Oximetry BMI (Body Mass Index)    76 98.5  F (36.9  C) (Oral) 1.734 m (5' 8.25\") 10/12/2017 (Approximate) 98% 63.85 kg/m2       Blood Pressure from Last 3 " Encounters:   10/25/17 120/80   08/21/17 122/82   07/12/17 132/88    Weight from Last 3 Encounters:   10/25/17 (!) 191.9 kg (423 lb)   08/23/17 (!) 187.2 kg (412 lb 9.6 oz)   08/21/17 (!) 186.9 kg (412 lb)              Today, you had the following     No orders found for display         Today's Medication Changes          These changes are accurate as of: 10/25/17 10:27 AM.  If you have any questions, ask your nurse or doctor.               Start taking these medicines.        Dose/Directions    guaiFENesin-codeine 100-10 MG/5ML Soln solution   Commonly known as:  ROBITUSSIN AC   Used for:  Acute non-recurrent maxillary sinusitis   Started by:  Bhavana Joseph PA        Dose:  1 tsp.   Take 5 mLs by mouth every 4 hours as needed for cough   Quantity:  120 mL   Refills:  0            Where to get your medicines      Some of these will need a paper prescription and others can be bought over the counter.  Ask your nurse if you have questions.     Bring a paper prescription for each of these medications     guaiFENesin-codeine 100-10 MG/5ML Soln solution                Primary Care Provider Office Phone # Fax #    BRYAN Olson [phone]       [address]  [facility] [address]        Equal Access to Services     LUCAS AVILA AH: Hadii estee ku hadasho Soomaali, waaxda luqadaha, qaybta kaalmada adeegyada, farhana bland. So [facility] [phone].    ATENCIÓN: Si habla español, tiene a cuenca disposición servicios gratuitos de asistencia lingüística. Mikael al [phone].    We comply with applicable federal civil rights laws and [place] laws. We do not discriminate on the basis of race, color, national origin, age, disability, sex, sexual orientation, or gender identity.            Thank you!     Thank you for choosing [facility]  for your care. Our goal is always to provide you with excellent care. Hearing back from our patients  is one way we can continue to improve our services. Please take a few minutes to complete the written survey that you may receive in the mail after your visit with us. Thank you!             Your Updated Medication List - Protect others around you: Learn how to safely use, store and throw away your medicines at www.disposemymeds.org.          This list is accurate as of: 10/25/17 10:27 AM.  Always use your most recent med list.                   Brand Name Dispense Instructions for use Diagnosis    buPROPion 150 MG 24 hr tablet    WELLBUTRIN XL    90 tablet    Take 1 tablet (150 mg) by mouth every morning    Major depressive disorder, recurrent episode, moderate (H)       cholecalciferol 5000 UNITS Caps capsule    vitamin D3     Take by mouth daily        escitalopram 20 MG tablet    LEXAPRO    90 tablet    Take 1 tablet (20 mg) by mouth daily    Major depressive disorder, recurrent episode, moderate (H)       guaiFENesin-codeine 100-10 MG/5ML Soln solution    ROBITUSSIN AC    120 mL    Take 5 mLs by mouth every 4 hours as needed for cough    Acute non-recurrent maxillary sinusitis       ibuprofen 600 MG tablet    ADVIL/MOTRIN    30 tablet    Take 1 tablet (600 mg) by mouth every 6 hours as needed for pain (mild)    Chronic cholecystitis       TYLENOL PO      Take 500 mg by mouth    Pneumonia of right middle lobe due to infectious organism (H)       VITAMIN C PO           ZYRTEC ALLERGY PO

## 2017-11-08 ENCOUNTER — MYC MEDICAL ADVICE (OUTPATIENT)
Dept: FAMILY MEDICINE | Facility: CLINIC | Age: 37
End: 2017-11-08

## 2017-11-08 DIAGNOSIS — J01.90 ACUTE SINUSITIS WITH SYMPTOMS > 10 DAYS: Primary | ICD-10-CM

## 2017-11-08 RX ORDER — AMOXICILLIN 875 MG
875 TABLET ORAL 2 TIMES DAILY
Qty: 20 TABLET | Refills: 0 | Status: SHIPPED | OUTPATIENT
Start: 2017-11-08 | End: 2017-12-20

## 2017-11-08 NOTE — TELEPHONE ENCOUNTER
Eleanor Morales, [facility] 11/8/2017 11:35 AM CST        ----- Message -----   From: Coretta Huitron   Sent: 11/8/2017 10:11 AM   To: [facility] Nurse Pool  Subject: Updates about my health     Hi Bhavana,    I saw you a few weeks ago about a cold/sinus infection? You said to contact you if I was still sick after 10-14 Days. I was getting better around 14-16 Days so I didn t call. I woke up this morning though and blew my nose (I ve been getting stuffy again the past 2 Days) and have green discharge again. Hoping you ll call in a script for an antibiotic this time. I feel like I never really recovered, got better, now getting worse again. I was trying to avoid antibiotics but think I need them.    Doreen Huitron

## 2017-12-12 ENCOUNTER — MYC MEDICAL ADVICE (OUTPATIENT)
Dept: FAMILY MEDICINE | Facility: CLINIC | Age: 37
End: 2017-12-12

## 2017-12-12 DIAGNOSIS — M62.830 BACK MUSCLE SPASM: Primary | ICD-10-CM

## 2017-12-12 RX ORDER — CYCLOBENZAPRINE HCL 5 MG
5 TABLET ORAL 3 TIMES DAILY PRN
Qty: 21 TABLET | Refills: 0 | Status: SHIPPED | OUTPATIENT
Start: 2017-12-12 | End: 2018-07-27 | Stop reason: DRUGHIGH

## 2017-12-12 NOTE — TELEPHONE ENCOUNTER
From: Coretta Huitron  To: Bhavana Joseph PA  Sent: 12/12/2017 4:56 PM CST  Subject: Question about medications    Jhon Hernandezire,    Last night I fell down a flight of 4 stairs. I really hurt my tail bone. I went to the chiropractor for an adjustment today. Based on how I was able to move he suspects it s just bruised. I m wondering if I could get a refill on my flexeril? I use it for headaches and it works great when I need it. However I d like to use it for my back right now. It s tight and having occasional spasms.    Doreen

## 2017-12-13 ENCOUNTER — MYC MEDICAL ADVICE (OUTPATIENT)
Dept: FAMILY MEDICINE | Facility: CLINIC | Age: 37
End: 2017-12-13

## 2017-12-13 NOTE — TELEPHONE ENCOUNTER
From: Coretta Huitron  To: Bhavana Joseph PA  Sent: 12/12/2017 9:03 PM CST  Subject: Updates about my health    No X-rays were done. I just discovered I have a dark purple bruise in my gluteal cleft. I reserved an appointment tomorrow in case you feel I need to be seen. Otherwise I m comfortable waiting a few days to see if it gets better.     Doreen

## 2017-12-13 NOTE — TELEPHONE ENCOUNTER
From: Coretta Huitron  To: Bhavana Joseph PA  Sent: 12/13/2017 8:03 AM CST  Subject: Updates about my health    I think I m going to wait a few days, if I m not getting better I ll come in. Thank you so much for the flexeril! I know it will help a lot! I ll keep you posted.    Doreen

## 2017-12-20 ENCOUNTER — OFFICE VISIT (OUTPATIENT)
Dept: FAMILY MEDICINE | Facility: CLINIC | Age: 37
End: 2017-12-20

## 2017-12-20 VITALS
TEMPERATURE: 98.3 F | SYSTOLIC BLOOD PRESSURE: 130 MMHG | DIASTOLIC BLOOD PRESSURE: 84 MMHG | OXYGEN SATURATION: 98 % | HEART RATE: 83 BPM
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
## 2017-12-20 DIAGNOSIS — M53.3 PAIN IN THE COCCYX: Primary | ICD-10-CM

## 2017-12-20 PROCEDURE — 99213 OFFICE O/P EST LOW 20 MIN: CPT | Performed by: PHYSICIAN ASSISTANT

## 2017-12-20 RX ORDER — OXYCODONE AND ACETAMINOPHEN 10; 325 MG/1; MG/1
1 TABLET ORAL EVERY 6 HOURS PRN
Qty: 20 TABLET | Refills: 0 | Status: SHIPPED | OUTPATIENT
Start: 2017-12-20 | End: 2018-07-27

## 2017-12-20 NOTE — NURSING NOTE
Coretta fell one week ago on a staircase and hurt her tailbone, pt states that her pain right now is 7/10    Pre-Visit Screening :  Immunizations : up to date    Colonoscopy : na  Mammogram : na  Asthma Action Test/Plan : na  PHQ9/GAD7 :  Na    Pulse - regular  My Chart - accepts    CLASSIFICATION OF OVERWEIGHT AND OBESITY BY BMI                         Obesity Class           BMI(kg/m2)  Underweight                                    < 18.5  Normal                                         18.5-24.9  Overweight                                     25.0-29.9  OBESITY                     I                  30.0-34.9                              II                 35.0-39.9  EXTREME OBESITY             III                >40                             Patient's  BMI Body mass index is 22.15 kg/(m^2).  http://hin.nhlbi.nih.gov/menuplanner/menu.cgi  Questioned patient about current smoking habits.  Pt. has never smoked.  The patient has verbalized that it is ok to leave a detailed voice message on the patient's cell phone with results/recommendations from this visit.       Verified [phone] phone number:

## 2017-12-20 NOTE — PROGRESS NOTES
SUBJECTIVE:                                                    Coretta Huitron is a 37 year old female who presents to clinic today for the following health issues:    Coretta is here with coccyx pain, fell down 4 stairs a week ago Monday.  Initially had back spasms and buttock pain.  Flexeril was prescribed via mychart and back pain has improved.    Still having pain when sitting, worse when standing up.  Throbbing sensation  8-9/10.     Denies Bowel/Bladder incontin.  No fevers  No change in BM.        BP Readings from Last 3 Encounters:   12/20/17 130/84   10/25/17 120/80   08/21/17 122/82    Wt Readings from Last 3 Encounters:   10/25/17 (!) 191.9 kg (423 lb)   08/23/17 (!) 187.2 kg (412 lb 9.6 oz)   08/21/17 (!) 186.9 kg (412 lb)            Patient Active Problem List   Diagnosis     Irritable bowel syndrome     ACP (advance care planning)     Health Care Home     Obstructive sleep apnea syndrome     Vitamin D deficiency     Tooth decay/ multiple cavities 2013     Knee pain     Major depressive disorder, recurrent episode, moderate (H)     Family history of heart failure     Hypertension     Allergic state, subsequent encounter     Morbid obesity, unspecified obesity type (H)     PCOS (polycystic ovarian syndrome) - per [facility]     Past Surgical History:   Procedure Laterality Date     BUNIONECTOMY  12/5/2013    Procedure: BUNIONECTOMY;   correct of bunion and bunionette deformation right with oteotomies;  Surgeon: Tanesha Rosa DPM;  Location: [facility] OR     HC CORRECT BUNION,SIMPLE  12/2004    fusion metatarsal     HC EXCIS PRIMARY GANGLION WRIST  1994, 1995    left      HC EXCIS RECURRENT GANGLION WRIST   2009, 2012    right     HC REMOVE TONSILS/ADENOIDS,<13 Y/O      age 8     LAPAROSCOPIC CHOLECYSTECTOMY N/A 11/4/2015    Procedure: LAPAROSCOPIC CHOLECYSTECTOMY;  Surgeon: Alyson Horne MD;  Location: [facility] OR       Social History   Substance Use Topics     Smoking status: Never Smoker     Smokeless  tobacco: Never Used     Alcohol use 0.0 oz/week     0 Standard drinks or equivalent per week      Comment: very rare     Family History   Problem Relation Age of Onset     CANCER Maternal Grandfather      CANCER Paternal Grandfather      Colon Cancer Paternal Grandfather      Thyroid Disease Mother      Hypertension Father      Coronary Artery Disease Father 65     MI -  in 2015     Sleep Apnea Father      Thyroid Disease Maternal Grandmother          Current Outpatient Prescriptions   Medication Sig Dispense Refill     oxyCODONE-acetaminophen (PERCOCET)  MG per tablet Take 1 tablet by mouth every 6 hours as needed for moderate to severe pain maximum 4 tablet(s) per day 20 tablet 0     cyclobenzaprine (FLEXERIL) 5 MG tablet Take 1 tablet (5 mg) by mouth 3 times daily as needed for muscle spasms 21 tablet 0     guaiFENesin-codeine (ROBITUSSIN AC) 100-10 MG/5ML SOLN solution Take 5 mLs by mouth every 4 hours as needed for cough 120 mL 0     buPROPion (WELLBUTRIN XL) 150 MG 24 hr tablet Take 1 tablet (150 mg) by mouth every morning 90 tablet 3     escitalopram (LEXAPRO) 20 MG tablet Take 1 tablet (20 mg) by mouth daily 90 tablet 3     cholecalciferol (VITAMIN D3) 5000 UNITS CAPS capsule Take by mouth daily       Acetaminophen (TYLENOL PO) Take 500 mg by mouth       Ascorbic Acid (VITAMIN C PO)        Cetirizine HCl (ZYRTEC ALLERGY PO)        ibuprofen (ADVIL,MOTRIN) 600 MG tablet Take 1 tablet (600 mg) by mouth every 6 hours as needed for pain (mild) 30 tablet 0       Allergies   Allergen Reactions     No Known Drug Allergies        OBJECTIVE:                                                    /84 (BP Location: Left arm, Patient Position: Chair, Cuff Size: Adult Large)  Pulse 83  Temp 98.3  F (36.8  C) (Oral)  SpO2 98%  Breastfeeding? No There is no height or weight on file to calculate BMI.     MS:   Pt is morbidly obese - Palpation of buttock/gluteal cleft does not recreate pain but patient is in  pain while sitting and when standing from sitting.    Rectal: was unable to palpate coccyx due to body habitus, rectal exam caused no pain.     Xray - normal - sent to [facility]         ASSESSMENT/PLAN:                                                        ICD-10-CM    1. Pain in the coccyx M53.3 oxyCODONE-acetaminophen (PERCOCET)  MG per tablet     XR Sacrum and Coccyx 2 Views     CANCELED: XR Sacrum and Coccyx 2 Views     CANCELED: RADIOLOGY REFERRAL     CANCELED: XR Sacrum and Coccyx 2 Views   2. Fall, initial encounter W19.XXXA oxyCODONE-acetaminophen (PERCOCET)  MG per tablet     XR Sacrum and Coccyx 2 Views     CANCELED: XR Sacrum and Coccyx 2 Views     CANCELED: RADIOLOGY REFERRAL     CANCELED: XR Sacrum and Coccyx 2 Views       High dose Ibuprofen advised with food qid  Percocet for break through pain  Stand when she can  Use donut hole cushion while sitting  Call me next week, ortho if not improving    Bhavana Joseph PA-C  [facility]

## 2017-12-20 NOTE — MR AVS SNAPSHOT
After Visit Summary   12/20/2017    Coretta Huitron    MRN: [id number]           Patient Information     Date Of Birth          1980        Visit Information        Provider Department      12/20/2017 9:45 AM Bhavana Joseph PA [facility]        Today's Diagnoses     Pain in the coccyx    -  1    Fall, initial encounter           Follow-ups after your visit        Who to contact     If you have questions or need follow up information about today's clinic visit or your schedule please contact [facility] directly at [phone].  Normal or non-critical lab and imaging results will be communicated to you by [employer], letter or phone within 4 business days after the clinic has received the results. If you do not hear from us within 7 days, please contact the clinic through [employer] or phone. If you have a critical or abnormal lab result, we will notify you by phone as soon as possible.  Submit refill requests through [employer] or call your pharmacy and they will forward the refill request to us. Please allow 3 business days for your refill to be completed.          Additional Information About Your Visit        MyChart Information     [employer] gives you secure access to your electronic health record. If you see a primary care provider, you can also send messages to your care team and make appointments. If you have questions, please call your primary care clinic.  If you do not have a primary care provider, please call [phone] and they will assist you.        Care EveryWhere ID     This is your Care EveryWhere ID. This could be used by other organizations to access your [place] medical records  [id number]        Your Vitals Were     Pulse Temperature Pulse Oximetry Breastfeeding?          83 98.3  F (36.8  C) (Oral) 98% No         Blood Pressure from Last 3 Encounters:   12/20/17 130/84   10/25/17 120/80   08/21/17 122/82    Weight from  Last 3 Encounters:   10/25/17 (!) 191.9 kg (423 lb)   08/23/17 (!) 187.2 kg (412 lb 9.6 oz)   08/21/17 (!) 186.9 kg (412 lb)              We Performed the Following     XR Sacrum and Coccyx 2 Views          Today's Medication Changes          These changes are accurate as of: 12/20/17 11:59 PM.  If you have any questions, ask your nurse or doctor.               Start taking these medicines.        Dose/Directions    oxyCODONE-acetaminophen  MG per tablet   Commonly known as:  PERCOCET   Used for:  Pain in the coccyx, Fall, initial encounter   Started by:  Bhavana Joseph PA        Dose:  1 tablet   Take 1 tablet by mouth every 6 hours as needed for moderate to severe pain maximum 4 tablet(s) per day   Quantity:  20 tablet   Refills:  0            Where to get your medicines      Some of these will need a paper prescription and others can be bought over the counter.  Ask your nurse if you have questions.     Bring a paper prescription for each of these medications     oxyCODONE-acetaminophen  MG per tablet                Primary Care Provider Office Phone # Fax #    BRYAN Olson [phone]       [address] [place] [id number]        Equal Access to Services     LUCAS AVILA AH: Hadii estee ku hadasho Soomaali, waaxda luqadaha, qaybta kaalmada adeegyada, waxay angelain haynhin josé bland. So [facility] [phone].    ATENCIÓN: Si habla español, tiene a cuenca disposición servicios gratuitos de asistencia lingüística. Llame al [phone].    We comply with applicable federal civil rights laws and [place] laws. We do not discriminate on the basis of race, color, national origin, age, disability, sex, sexual orientation, or gender identity.            Thank you!     Thank you for choosing [facility]  for your care. Our goal is always to provide you with excellent care. Hearing back from our patients is one way we can continue to  improve our services. Please take a few minutes to complete the written survey that you may receive in the mail after your visit with us. Thank you!             Your Updated Medication List - Protect others around you: Learn how to safely use, store and throw away your medicines at www.disposemymeds.org.          This list is accurate as of: 12/20/17 11:59 PM.  Always use your most recent med list.                   Brand Name Dispense Instructions for use Diagnosis    buPROPion 150 MG 24 hr tablet    WELLBUTRIN XL    90 tablet    Take 1 tablet (150 mg) by mouth every morning    Major depressive disorder, recurrent episode, moderate (H)       cholecalciferol 5000 UNITS Caps capsule    vitamin D3     Take by mouth daily        cyclobenzaprine 5 MG tablet    FLEXERIL    21 tablet    Take 1 tablet (5 mg) by mouth 3 times daily as needed for muscle spasms    Back muscle spasm       escitalopram 20 MG tablet    LEXAPRO    90 tablet    Take 1 tablet (20 mg) by mouth daily    Major depressive disorder, recurrent episode, moderate (H)       guaiFENesin-codeine 100-10 MG/5ML Soln solution    ROBITUSSIN AC    120 mL    Take 5 mLs by mouth every 4 hours as needed for cough    Acute non-recurrent maxillary sinusitis       ibuprofen 600 MG tablet    ADVIL/MOTRIN    30 tablet    Take 1 tablet (600 mg) by mouth every 6 hours as needed for pain (mild)    Chronic cholecystitis       oxyCODONE-acetaminophen  MG per tablet    PERCOCET    20 tablet    Take 1 tablet by mouth every 6 hours as needed for moderate to severe pain maximum 4 tablet(s) per day    Pain in the coccyx, Fall, initial encounter       TYLENOL PO      Take 500 mg by mouth    Pneumonia of right middle lobe due to infectious organism (H)       VITAMIN C PO           ZYRTEC ALLERGY PO

## 2017-12-21 ENCOUNTER — TELEPHONE (OUTPATIENT)
Dept: FAMILY MEDICINE | Facility: CLINIC | Age: 37
End: 2017-12-21

## 2017-12-21 NOTE — TELEPHONE ENCOUNTER
Coretta called CS today asking if we had received her xray results that she had done yesterday.    Please advise    Vanessa Weinstein MA

## 2018-01-12 ENCOUNTER — TELEPHONE (OUTPATIENT)
Dept: FAMILY MEDICINE | Facility: CLINIC | Age: 38
End: 2018-01-12

## 2018-01-12 DIAGNOSIS — F43.21 SITUATIONAL DEPRESSION: Primary | ICD-10-CM

## 2018-01-12 NOTE — TELEPHONE ENCOUNTER
Doreen called CS today stating that she has been having trouble sleeping because her mother is in critical condition in the ICU. She states that she has been uncontrollably crying, not able to cope, and is wondering if you can give her a small RX for ativan or would you need her to come into the office for a visit? She was last in 12-20-17. Pt also states that she is still taking her antidepressants, and said that she can either be called back ([phone]) or sent a mychart.    Please advise,  Thank you,  Jazmyne

## 2018-01-15 RX ORDER — CLONAZEPAM 0.5 MG/1
TABLET ORAL
Qty: 10 TABLET | Refills: 0 | Status: SHIPPED | OUTPATIENT
Start: 2018-01-15 | End: 2019-05-21

## 2018-01-15 NOTE — TELEPHONE ENCOUNTER
Noted.   Spoke with pt Monday  Low dose Benzo for sleep only this week.  See me in clinic next week if not improving  Pt will schedule therapy appt.    Bhavana Joseph PA-C  1/15/2018

## 2018-02-09 ENCOUNTER — TRANSFERRED RECORDS (OUTPATIENT)
Dept: FAMILY MEDICINE | Facility: CLINIC | Age: 38
End: 2018-02-09

## 2018-07-25 ENCOUNTER — TELEPHONE (OUTPATIENT)
Dept: FAMILY MEDICINE | Facility: CLINIC | Age: 38
End: 2018-07-25

## 2018-07-25 NOTE — TELEPHONE ENCOUNTER
Coretta called to state that she opened her meds today which she picked up from pharmacy the other day and noticed that one of the bottles had the incorrect medication.  She takes escitalopram and the pharmacy filled citalopram.  I looked in her chart to see that it was escitalopram that was documented as being called to pharmacy which it was.  I then called the pharmacy and the pharmacist stated it was written down as citalopram so they said it was called in as citalopram.  I let her know that we called in escitalopram and she was not willing to be open to the fact that someone wrote down the wrong med on their end.  I then called in the correct prescription for 30 days, however, I said to the pharmacist that the patient should not be charged for this since they filled the wrong meds, however she was not willing to help in any way as she stated they cannot take any meds back once they leave the pharmacy which is true, but not patient's fault.  I then LM on  for patient to call me back to discuss.  These two meds are so close that I have instructed all employees to be sure they are stating the generic and brand name which the support staff who called this in did say she stated both names when verballing calling in the medication so I will discuss with the patient that she may want to ask the pharmacy if they confirmed the med, or if they wrote it down incorrectly so hopefully she will not have to pay for this.

## 2018-07-27 ENCOUNTER — OFFICE VISIT (OUTPATIENT)
Dept: FAMILY MEDICINE | Facility: CLINIC | Age: 38
End: 2018-07-27

## 2018-07-27 VITALS
HEART RATE: 67 BPM | DIASTOLIC BLOOD PRESSURE: 74 MMHG | TEMPERATURE: 98.7 F | SYSTOLIC BLOOD PRESSURE: 112 MMHG | OXYGEN SATURATION: 97 %

## 2018-07-27 DIAGNOSIS — R19.7 DIARRHEA, UNSPECIFIED TYPE: ICD-10-CM

## 2018-07-27 DIAGNOSIS — T78.40XD ALLERGIC STATE, SUBSEQUENT ENCOUNTER: ICD-10-CM

## 2018-07-27 DIAGNOSIS — E66.01 MORBID OBESITY, UNSPECIFIED OBESITY TYPE (H): ICD-10-CM

## 2018-07-27 DIAGNOSIS — F33.1 MAJOR DEPRESSIVE DISORDER, RECURRENT EPISODE, MODERATE (H): Primary | ICD-10-CM

## 2018-07-27 DIAGNOSIS — G44.219 EPISODIC TENSION-TYPE HEADACHE, NOT INTRACTABLE: ICD-10-CM

## 2018-07-27 PROCEDURE — 99214 OFFICE O/P EST MOD 30 MIN: CPT | Performed by: PHYSICIAN ASSISTANT

## 2018-07-27 RX ORDER — CYCLOBENZAPRINE HCL 10 MG
TABLET ORAL
Qty: 30 TABLET | Refills: 0 | Status: SHIPPED | OUTPATIENT
Start: 2018-07-27 | End: 2024-01-22

## 2018-07-27 RX ORDER — ESCITALOPRAM OXALATE 20 MG/1
20 TABLET ORAL DAILY
Qty: 90 TABLET | Refills: 1 | Status: SHIPPED | OUTPATIENT
Start: 2018-07-27 | End: 2018-12-11

## 2018-07-27 RX ORDER — CYCLOBENZAPRINE HCL 5 MG
5 TABLET ORAL 3 TIMES DAILY PRN
Qty: 21 TABLET | Refills: 0 | Status: CANCELLED | OUTPATIENT
Start: 2018-07-27

## 2018-07-27 RX ORDER — BUPROPION HYDROCHLORIDE 150 MG/1
150 TABLET ORAL EVERY MORNING
Qty: 90 TABLET | Refills: 1 | Status: SHIPPED | OUTPATIENT
Start: 2018-07-27 | End: 2018-12-11

## 2018-07-27 NOTE — NURSING NOTE
Doreen is here for a med check    Pre-Visit Screening :  Immunizations : up to date    Colonoscopy : na  Mammogram : na  Asthma Action Test/Plan : na  PHQ9/GAD7 :  phq9    Pulse - regular  My Chart - accepts    CLASSIFICATION OF OVERWEIGHT AND OBESITY BY BMI                         Obesity Class           BMI(kg/m2)  Underweight                                    < 18.5  Normal                                         18.5-24.9  Overweight                                     25.0-29.9  OBESITY                     I                  30.0-34.9                              II                 35.0-39.9  EXTREME OBESITY             III                >40                             Patient's  BMI Body mass index is 22.15 kg/(m^2).  http://hin.nhlbi.nih.gov/menuplanner/menu.cgi  Questioned patient about current smoking habits.  Pt. has never smoked.  The patient has verbalized that it is ok to leave a detailed voice message on the patient's home voicemail with results/recommendations from this visit.       Verified [phone]  phone number:

## 2018-07-27 NOTE — MR AVS SNAPSHOT
After Visit Summary   7/27/2018    Coretta Huitron    MRN: [id number]           Patient Information     Date Of Birth          1980        Visit Information        Provider Department      7/27/2018 10:00 AM Bhavana Joseph PA [facility]        Today's Diagnoses     Major depressive disorder, recurrent episode, moderate (H)    -  1    Morbid obesity, unspecified obesity type (H)        Episodic tension-type headache, not intractable        Diarrhea, unspecified type        Allergic state, subsequent encounter           Follow-ups after your visit        Who to contact     If you have questions or need follow up information about today's clinic visit or your schedule please contact [facility] directly at [phone].  Normal or non-critical lab and imaging results will be communicated to you by Inflectionhart, letter or phone within 4 business days after the clinic has received the results. If you do not hear from us within 7 days, please contact the clinic through Inflectionhart or phone. If you have a critical or abnormal lab result, we will notify you by phone as soon as possible.  Submit refill requests through ZeroCater or call your pharmacy and they will forward the refill request to us. Please allow 3 business days for your refill to be completed.          Additional Information About Your Visit        MyChart Information     ZeroCater gives you secure access to your electronic health record. If you see a primary care provider, you can also send messages to your care team and make appointments. If you have questions, please call your primary care clinic.  If you do not have a primary care provider, please call [phone] and they will assist you.        Care EveryWhere ID     This is your Care EveryWhere ID. This could be used by other organizations to access your [facility] medical records  [id number]        Your Vitals Were     Pulse  Temperature Pulse Oximetry Breastfeeding?          67 98.7  F (37.1  C) (Oral) 97% No         Blood Pressure from Last 3 Encounters:   07/27/18 112/74   12/20/17 130/84   10/25/17 120/80    Weight from Last 3 Encounters:   10/25/17 (!) 191.9 kg (423 lb)   08/23/17 (!) 187.2 kg (412 lb 9.6 oz)   08/21/17 (!) 186.9 kg (412 lb)              Today, you had the following     No orders found for display         Today's Medication Changes          These changes are accurate as of 7/27/18 12:12 PM.  If you have any questions, ask your nurse or doctor.               These medicines have changed or have updated prescriptions.        Dose/Directions    cyclobenzaprine 10 MG tablet   Commonly known as:  FLEXERIL   This may have changed:    - medication strength  - how much to take  - how to take this  - when to take this  - reasons to take this  - additional instructions   Used for:  Episodic tension-type headache, not intractable   Changed by:  Bhavana Joseph PA        Take one as needed for tension headaches   Quantity:  30 tablet   Refills:  0            Where to get your medicines      [place] [facility] [place] [id number] [place] [address]  [address] [facility] [address]     Phone:  [phone]     buPROPion 150 MG 24 hr tablet    cyclobenzaprine 10 MG tablet    escitalopram 20 MG tablet                Primary Care Provider Office Phone # Fax #    BRYAN Olson [phone] [address] [facility] [address]        Equal Access to Services     [facility]: Hadii aad ku hadasho Soomaali, waaxda luqadaha, qaybta kaalmada adeegyada, farhana sapp hayalecia bland. So [facility] [phone].    ATENCIÓN: Si habla español, tiene a cuenca disposición servicios gratuitos de asistencia lingüística. Llame al [phone].    We comply with applicable federal civil rights laws and [place] laws. We do not discriminate on the  basis of race, color, national origin, age, disability, sex, sexual orientation, or gender identity.            Thank you!     Thank you for choosing [facility]  for your care. Our goal is always to provide you with excellent care. Hearing back from our patients is one way we can continue to improve our services. Please take a few minutes to complete the written survey that you may receive in the mail after your visit with us. Thank you!             Your Updated Medication List - Protect others around you: Learn how to safely use, store and throw away your medicines at www.disposemymeds.org.          This list is accurate as of 7/27/18 12:12 PM.  Always use your most recent med list.                   Brand Name Dispense Instructions for use Diagnosis    buPROPion 150 MG 24 hr tablet    WELLBUTRIN XL    90 tablet    Take 1 tablet (150 mg) by mouth every morning    Major depressive disorder, recurrent episode, moderate (H)       cholecalciferol 5000 units Caps capsule    vitamin D3     Take by mouth daily        clonazePAM 0.5 MG tablet    klonoPIN    10 tablet    Take one at bedtime for sleep    Situational depression       cyclobenzaprine 10 MG tablet    FLEXERIL    30 tablet    Take one as needed for tension headaches    Episodic tension-type headache, not intractable       escitalopram 20 MG tablet    LEXAPRO    90 tablet    Take 1 tablet (20 mg) by mouth daily    Major depressive disorder, recurrent episode, moderate (H)       guaiFENesin-codeine 100-10 MG/5ML Soln solution    ROBITUSSIN AC    120 mL    Take 5 mLs by mouth every 4 hours as needed for cough    Acute non-recurrent maxillary sinusitis       ibuprofen 600 MG tablet    ADVIL/MOTRIN    30 tablet    Take 1 tablet (600 mg) by mouth every 6 hours as needed for pain (mild)    Chronic cholecystitis       TYLENOL PO      Take 500 mg by mouth    Pneumonia of right middle lobe due to infectious organism (H)       VITAMIN C PO

## 2018-07-27 NOTE — PROGRESS NOTES
SUBJECTIVE:   Coretta Huitron is a 37 year old female who presents to clinic today for the following health issues:      1. Depression Followup    Status since last visit: Stable NOW - 4 months ago worsened - started seeing therapist - well controlled    See PHQ-9 for current symptoms.  Other associated symptoms: None    Complicating factors:   Significant life event:  No - applied for new job and got it but would have been pay cut so she talked to current boss and reduced her work load at current job   Current substance abuse:  None  Anxiety or Panic symptoms:  Yes-  Rare panic    PHQ-9 9/3/2015 5/17/2016 7/12/2017   Total Score 7 7 5   Q9: Suicide Ideation Not at all Not at all Not at all     In the past two weeks have you had thoughts of suicide or self-harm?  No.    Do you have concerns about your personal safety or the safety of others?   No  PHQ-9  English  PHQ-9   Any Language  Suicide Assessment Five-step Evaluation and Treatment (SAFE-T)    2. Tension headaches:    Onset: years  Location:bilateral temporal  Duration: 1-2 days  Alleviating: PT and Flexeril  Treatments:PT and Flexeril  Associated Sx:  none  Has stopped PT - will restart exercises    3. Diarrhea  Onset: last week for 5 days    Description:   Consistency of stool: watery  Blood in stool: no   Number of loose stools in past 24 hours: 0    Progression of Symptoms:  resolved    Accompanying Signs & Symptoms:  Fever: no   Nausea or vomiting; YES - always has nausea  Abdominal pain: no   Episodes of constipation: no   Weight loss: no       Precipitating factors:   none    Alleviating factors:   Got menses and sx resolved    Therapies Tried and outcome:  none; Outcome: resolved        4.   ALLERGIES     Onset: years    Description:   Nasal congestion: YES  Sneezing: YES  Red, itchy eyes: YES    Progression of Symptoms:  worse    Accompanying Signs & Symptoms:  Cough: no   Wheezing: no   Rash: no   Sinus/facial pain: no  + throat clearing and PND    History:   Is it seasonal: during any time of the year   History of Asthma: YES  Has allergy testing been done: no     Precipitating factors:   None    Alleviating factors:  Allegra       Therapies Tried and outcome: Allegra          BP Readings from Last 6 Encounters:   07/27/18 112/74   12/20/17 130/84   10/25/17 120/80   08/21/17 122/82   07/12/17 132/88   04/19/17 128/84           Amount of exercise or physical activity: None    Problems taking medications regularly: No    Medication side effects: none  Diet: regular (no restrictions) - STOPPED EATING FAST FOOD BREAKFAST      -------------------------------------    Problem list and histories reviewed & adjusted, as indicated.  Additional history: as documented    Patient Active Problem List   Diagnosis     Irritable bowel syndrome     ACP (advance care planning)     Health Care Home     Obstructive sleep apnea syndrome     Vitamin D deficiency     Tooth decay/ multiple cavities 2013     Knee pain     Major depressive disorder, recurrent episode, moderate (H)     Family history of heart failure     Hypertension     Allergic state, subsequent encounter     Morbid obesity, unspecified obesity type (H)     PCOS (polycystic ovarian syndrome) - per [facility]     Past Surgical History:   Procedure Laterality Date     BUNIONECTOMY  12/5/2013    Procedure: BUNIONECTOMY;   correct of bunion and bunionette deformation right with oteotomies;  Surgeon: Tanesha Rosa DPM;  Location: [facility] OR     HC CORRECT BUNION,SIMPLE  12/2004    fusion metatarsal     HC EXCIS PRIMARY GANGLION WRIST  1994, 1995    left      HC EXCIS RECURRENT GANGLION WRIST   2009, 2012    right     HC REMOVE TONSILS/ADENOIDS,<11 Y/O      age 8     LAPAROSCOPIC CHOLECYSTECTOMY N/A 11/4/2015    Procedure: LAPAROSCOPIC CHOLECYSTECTOMY;  Surgeon: Alyson Horne MD;  Location: [facility] OR       Social History   Substance Use Topics     Smoking status: Never Smoker     Smokeless tobacco: Never Used      Alcohol use 0.0 oz/week     0 Standard drinks or equivalent per week      Comment: very rare     Family History   Problem Relation Age of Onset     Cancer Maternal Grandfather      Cancer Paternal Grandfather      Colon Cancer Paternal Grandfather      Thyroid Disease Mother      Hypertension Father      Coronary Artery Disease Father 65     MI -  in      Sleep Apnea Father      Thyroid Disease Maternal Grandmother          Current Outpatient Prescriptions   Medication Sig Dispense Refill     Acetaminophen (TYLENOL PO) Take 500 mg by mouth       Ascorbic Acid (VITAMIN C PO)        buPROPion (WELLBUTRIN XL) 150 MG 24 hr tablet Take 1 tablet (150 mg) by mouth every morning 30 tablet 0     cholecalciferol (VITAMIN D3) 5000 UNITS CAPS capsule Take by mouth daily       clonazePAM (KLONOPIN) 0.5 MG tablet Take one at bedtime for sleep 10 tablet 0     cyclobenzaprine (FLEXERIL) 5 MG tablet Take 1 tablet (5 mg) by mouth 3 times daily as needed for muscle spasms 21 tablet 0     escitalopram (LEXAPRO) 20 MG tablet Take 1 tablet (20 mg) by mouth daily 30 tablet 0     guaiFENesin-codeine (ROBITUSSIN AC) 100-10 MG/5ML SOLN solution Take 5 mLs by mouth every 4 hours as needed for cough 120 mL 0     ibuprofen (ADVIL,MOTRIN) 600 MG tablet Take 1 tablet (600 mg) by mouth every 6 hours as needed for pain (mild) 30 tablet 0     Allergies   Allergen Reactions     No Known Drug Allergies      Recent Labs   Lab Test  17   0840  16   2353  16   1119  10/30/15   1130   09/03/15   1750   10/26/12   1200   03/15/11   0700   LDL   --    --   86   --    --    --    --   85   --    --    HDL   --    --   56   --    --    --    --   62   --    --    TRIG   --    --   98   --    --    --    --   83   --    --    ALT   --   19   --   26   --    --    --    --    --    --    CR  0.61  0.63   --   0.62   --   0.73   < >  0.57   < >   --    GFRESTIMATED  >90  Non  GFR Calc    >90  Non  GFR  Calc     --   >90  Non  GFR Calc     --   107   < >  124   < >   --    GFRESTBLACK  >90   GFR Calc    >90   GFR Calc     --   >90   GFR Calc     < >   --    --    --    --    --    POTASSIUM  4.4  3.4   --   3.6   --   3.9   < >  4.0   < >   --    TSH   --    --    --    --    --   3.05   --    --    --   2.42    < > = values in this interval not displayed.      BP Readings from Last 3 Encounters:   07/27/18 112/74   12/20/17 130/84   10/25/17 120/80    Wt Readings from Last 3 Encounters:   10/25/17 (!) 191.9 kg (423 lb)   08/23/17 (!) 187.2 kg (412 lb 9.6 oz)   08/21/17 (!) 186.9 kg (412 lb)                    Reviewed and updated as needed this visit by clinical staff  Tobacco  Allergies  Meds  Problems       Reviewed and updated as needed this visit by Provider  Meds  Problems         ROS:  CONSTITUTIONAL: NEGATIVE for fever, chills, change in weight  EYES: NEGATIVE for vision changes or irritation  ENT/MOUTH: NEGATIVE for ear, mouth and throat problems  RESP: NEGATIVE for significant cough or SOB  CV: NEGATIVE for chest pain, palpitations or peripheral edema  GI: NEGATIVE for nausea, abdominal pain, heartburn, or change in bowel habits  : NEGATIVE for frequency, dysuria, or hematuria  MUSCULOSKELETAL: NEGATIVE for significant arthralgias or myalgia  NEURO: NEGATIVE for weakness, dizziness or paresthesias  PSYCHIATRIC: NEGATIVE for changes in mood or affect    OBJECTIVE:     /74 (BP Location: Right arm, Patient Position: Chair, Cuff Size: Adult Large)  Pulse 67  Temp 98.7  F (37.1  C) (Oral)  SpO2 97%  Breastfeeding? No  There is no height or weight on file to calculate BMI.  GENERAL: alert and no distress  EYES: Eyes grossly normal to inspection, conjunctivae and sclerae normal  HENT: ear canals and TM's normal, nose and mouth without ulcers or lesions  NECK: no adenopathy, no asymmetry, masses, or scars   RESP: lungs clear to  "auscultation - no rales, rhonchi or wheezes  CV: regular rate and rhythm, normal S1 S2, no S3 or S4, no murmur, click or rub,   MS: no gross musculoskeletal defects noted, no edema  SKIN: no suspicious lesions or rashes  NEURO: Normal strength and tone, mentation intact and speech normal  PSYCH: mentation appears normal, affect normal/bright        ASSESSMENT/PLAN:     (F33.1) Major depressive disorder, recurrent episode, moderate (H)  (primary encounter diagnosis)  Comment: IMPROVING  Plan: buPROPion (WELLBUTRIN XL) 150 MG 24 hr tablet,         escitalopram (LEXAPRO) 20 MG tablet         Continue therapy      (E66.01) Morbid obesity, unspecified obesity type (H)  Comment: weight increasing  Plan: working with therapist on diet changes    (G44.219) Episodic tension-type headache, not intractable  Comment: stable  Plan: cyclobenzaprine (FLEXERIL) 10 MG tablet        Restart home PT exercises    (R19.7) Diarrhea, unspecified type  Comment: New  Plan: resolved - RTC if this recurs    (T78.40XD) Allergic state, subsequent encounter  Comment: worsening  Plan: start Zatidor and Flonase  Call me in 2 weeks, if not improving will start Singulair  I reviewed the patient information handout from Up To Date on this medication including side effects with the patient.          BMI:   Estimated body mass index is 63.85 kg/(m^2) as calculated from the following:    Height as of 10/25/17: 1.734 m (5' 8.25\").    Weight as of 10/25/17: 191.9 kg (423 lb).   Weight management plan: Discussed healthy diet and exercise guidelines and patient will follow up in 6 months in clinic to re-evaluate.          FUTURE APPOINTMENTS:       - Follow-up visit in 6 months fasting CPE    BRYAN Olson  [facility]    "

## 2018-07-28 ASSESSMENT — PATIENT HEALTH QUESTIONNAIRE - PHQ9: SUM OF ALL RESPONSES TO PHQ QUESTIONS 1-9: 5

## 2018-09-07 ENCOUNTER — HEALTH MAINTENANCE LETTER (OUTPATIENT)
Age: 38
End: 2018-09-07

## 2018-12-11 ENCOUNTER — OFFICE VISIT (OUTPATIENT)
Dept: FAMILY MEDICINE | Facility: CLINIC | Age: 38
End: 2018-12-11

## 2018-12-11 VITALS
DIASTOLIC BLOOD PRESSURE: 82 MMHG | HEART RATE: 86 BPM | OXYGEN SATURATION: 99 % | TEMPERATURE: 98.6 F | WEIGHT: 293 LBS | BODY MASS INDEX: 44.41 KG/M2 | SYSTOLIC BLOOD PRESSURE: 110 MMHG | HEIGHT: 68 IN

## 2018-12-11 DIAGNOSIS — Z01.419 ENCOUNTER FOR GYNECOLOGICAL EXAMINATION WITHOUT ABNORMAL FINDING: Primary | ICD-10-CM

## 2018-12-11 DIAGNOSIS — E66.01 MORBID OBESITY, UNSPECIFIED OBESITY TYPE (H): ICD-10-CM

## 2018-12-11 DIAGNOSIS — K02.9 TOOTH DECAY: ICD-10-CM

## 2018-12-11 DIAGNOSIS — F33.1 MAJOR DEPRESSIVE DISORDER, RECURRENT EPISODE, MODERATE (H): ICD-10-CM

## 2018-12-11 DIAGNOSIS — G47.33 OBSTRUCTIVE SLEEP APNEA SYNDROME: ICD-10-CM

## 2018-12-11 DIAGNOSIS — E28.2 PCOS (POLYCYSTIC OVARIAN SYNDROME): ICD-10-CM

## 2018-12-11 DIAGNOSIS — N92.1 MENORRHAGIA WITH IRREGULAR CYCLE: ICD-10-CM

## 2018-12-11 DIAGNOSIS — K58.9 IRRITABLE BOWEL SYNDROME, UNSPECIFIED TYPE: ICD-10-CM

## 2018-12-11 DIAGNOSIS — E55.9 VITAMIN D DEFICIENCY: ICD-10-CM

## 2018-12-11 PROBLEM — Z82.49 FAMILY HISTORY OF HEART FAILURE: Status: RESOLVED | Noted: 2017-04-17 | Resolved: 2018-12-11

## 2018-12-11 LAB
ERYTHROCYTE [DISTWIDTH] IN BLOOD BY AUTOMATED COUNT: 12.3 %
GLUCOSE SERPL-MCNC: 86 MG/DL (ref 60–99)
HCT VFR BLD AUTO: 43.9 % (ref 35–47)
HEMOGLOBIN: 14.4 G/DL (ref 11.7–15.7)
MCH RBC QN AUTO: 31.4 PG (ref 26–33)
MCHC RBC AUTO-ENTMCNC: 32.8 G/DL (ref 31–36)
MCV RBC AUTO: 95.9 FL (ref 78–100)
PLATELET COUNT - QUEST: 337 10^9/L (ref 150–375)
RBC # BLD AUTO: 4.58 10*12/L (ref 3.8–5.2)
WBC # BLD AUTO: 6.9 10*9/L (ref 4–11)

## 2018-12-11 PROCEDURE — 36415 COLL VENOUS BLD VENIPUNCTURE: CPT | Performed by: PHYSICIAN ASSISTANT

## 2018-12-11 PROCEDURE — 99395 PREV VISIT EST AGE 18-39: CPT | Performed by: PHYSICIAN ASSISTANT

## 2018-12-11 PROCEDURE — 80061 LIPID PANEL: CPT | Mod: 90 | Performed by: PHYSICIAN ASSISTANT

## 2018-12-11 PROCEDURE — 85027 COMPLETE CBC AUTOMATED: CPT | Performed by: PHYSICIAN ASSISTANT

## 2018-12-11 PROCEDURE — 82947 ASSAY GLUCOSE BLOOD QUANT: CPT | Performed by: PHYSICIAN ASSISTANT

## 2018-12-11 RX ORDER — BUPROPION HYDROCHLORIDE 150 MG/1
150 TABLET ORAL EVERY MORNING
Qty: 90 TABLET | Refills: 0 | Status: SHIPPED | OUTPATIENT
Start: 2018-12-11 | End: 2019-04-23

## 2018-12-11 RX ORDER — ESCITALOPRAM OXALATE 20 MG/1
20 TABLET ORAL DAILY
Qty: 90 TABLET | Refills: 0 | Status: SHIPPED | OUTPATIENT
Start: 2018-12-11 | End: 2019-04-23

## 2018-12-11 ASSESSMENT — MIFFLIN-ST. JEOR: SCORE: 2619.9

## 2018-12-11 NOTE — PROGRESS NOTES
SUBJECTIVE:   CC: Sabina Huitron is an 38 year old woman who presents for preventive health visit.     Healthy Habits:    Do you get at least three servings of calcium containing foods daily (dairy, green leafy vegetables, etc.)? yes    Amount of exercise or daily activities, outside of work: minimal    Problems taking medications regularly No    Medication side effects: No    Have you had an eye exam in the past two years? yes    Do you see a dentist twice per year? no    Do you have sleep apnea, excessive snoring or daytime drowsiness?yes        Periods have become irregular  Had 3 his month  VERY painful cramps  Normally periods every 3 weeks.   Has had menses 3 times this week.   Currently has menses again    Pelvic US 2 years ago:  [facility] [place]  Phone: [phone] * Fax: [phone] [address], [place] [address] [address] [place] [address]  Age: 36 Y Work Phone:  Dept No.: [phone]  SABINA HUITRON : 1980 Home Phone: [phone]  Chart #  Gender: F  Req. Phys: Bhavana Joseph PA Clinic MRN:  Clinic: [facility] Acc#: [id number]  Exam: US PELVIS WITH TRANSVAGINAL Exam Date: 2016  US PELVIS WITH TRANSVAGINAL 2016 9:33 AM  HISTORY: Menstrual period for three weeks.  COMPARISON: None.  TECHNIQUE: Transabdominal and transvaginal imaging was performed.  Transvaginal exam performed to better  evaluate the uterus, ovaries and adnexa.  FINDINGS: The endometrium measures 3.8 mm in thickness. The  uterus measures 8.7 x 4.8 x 5.1 cm in size. No fibroids  are seen. The right ovary is normal in size measuring 2.3 x 1.0 x  1.5 cm. The left ovary is normal in size measuring 1.9 x  1.3 x 1.5 cm. There is a 1.2 cm follicle in the left ovary. No  adnexal mass. No free fluid.  IMPRESSION: Normal-appearing uterus and ovaries. The endometrium  measures 3.8 mm in thickness. It appears normal.  Performed by:  LS  Transcribed By: SARAH on: 11/18/2016 10:31 AM CST    Dg PCOS per Endocrinologist        Vit D  MV  Bile Salt for absent gallbladder  Allegra  Vit D  Lexapro  Wellbutrin  Probiotic     Took Phentermine for a while through [facility]      Regular soda daily 2-3  - cut out last month    Wt Readings from Last 5 Encounters:   12/11/18 (!) 188.2 kg (415 lb)   10/25/17 (!) 191.9 kg (423 lb)   08/23/17 (!) 187.2 kg (412 lb 9.6 oz)   08/21/17 (!) 186.9 kg (412 lb)   07/12/17 (!) 184.7 kg (407 lb 3.2 oz)           Depression Followup    Status since last visit: Worsened - missed doses regularly now    See PHQ-9 for current symptoms.  Other associated symptoms: None    Complicating factors:   Significant life event:  No   Current substance abuse:  None  Anxiety or Panic symptoms:  Yes-  Panic attack last month    PHQ 5/17/2016 7/12/2017 7/27/2018   PHQ-9 Total Score 7 5 5   Q9: Suicide Ideation Not at all Not at all Not at all     In the past two weeks have you had thoughts of suicide or self-harm?  No.    Do you have concerns about your personal safety or the safety of others?   No  PHQ-9  English  PHQ-9   Any Language  Suicide Assessment Five-step Evaluation and Treatment (SAFE-T)    Today's PHQ-2 Score: No flowsheet data found.        Social History     Tobacco Use     Smoking status: Never Smoker     Smokeless tobacco: Never Used   Substance Use Topics     Alcohol use: Yes     Alcohol/week: 0.0 oz     Comment: very rare     If you drink alcohol do you typically have >3 drinks per day or >7 drinks per week? No                     Reviewed orders with patient.  Reviewed health maintenance and updated orders accordingly - Yes  Labs reviewed in EPIC  BP Readings from Last 3 Encounters:   12/11/18 110/82   07/27/18 112/74   12/20/17 130/84    Wt Readings from Last 3 Encounters:   12/11/18 (!) 188.2 kg (415 lb)   10/25/17 (!) 191.9 kg (423 lb)   08/23/17 (!) 187.2 kg (412 lb 9.6 oz)                  Patient Active Problem List    Diagnosis     Irritable bowel syndrome     ACP (advance care planning)     Health Care Home     Obstructive sleep apnea syndrome     Vitamin D deficiency     Tooth decay/ multiple cavities      Knee pain     Major depressive disorder, recurrent episode, moderate (H)     Allergic state, subsequent encounter     Morbid obesity, unspecified obesity type (H)     PCOS (polycystic ovarian syndrome) - per [facility]     Past Surgical History:   Procedure Laterality Date     BUNIONECTOMY  2013    Procedure: BUNIONECTOMY;   correct of bunion and bunionette deformation right with oteotomies;  Surgeon: Tanesha Rosa DPM;  Location: [facility] OR     HC CORRECT BUNION,SIMPLE  2004    fusion metatarsal     HC EXCIS PRIMARY GANGLION WRIST  ,     left      HC EXCIS RECURRENT GANGLION WRIST   ,     right     HC REMOVE TONSILS/ADENOIDS,<13 Y/O      age 8     LAPAROSCOPIC CHOLECYSTECTOMY N/A 2015    Procedure: LAPAROSCOPIC CHOLECYSTECTOMY;  Surgeon: Alyson Horne MD;  Location: [facility] OR       Social History     Tobacco Use     Smoking status: Never Smoker     Smokeless tobacco: Never Used   Substance Use Topics     Alcohol use: Yes     Alcohol/week: 0.0 oz     Comment: very rare     Family History   Problem Relation Age of Onset     Cancer Maternal Grandfather      Cancer Paternal Grandfather      Colon Cancer Paternal Grandfather      Thyroid Disease Mother      Hypertension Father      Coronary Artery Disease Father 65        MI -  in      Sleep Apnea Father      Thyroid Disease Maternal Grandmother          Current Outpatient Medications   Medication Sig Dispense Refill     Acetaminophen (TYLENOL PO) Take 500 mg by mouth       Ascorbic Acid (VITAMIN C PO)        buPROPion (WELLBUTRIN XL) 150 MG 24 hr tablet Take 1 tablet (150 mg) by mouth every morning 90 tablet 1     cholecalciferol (VITAMIN D3) 5000 UNITS CAPS capsule Take by mouth daily       cyclobenzaprine (FLEXERIL) 10 MG tablet  Take one as needed for tension headaches 30 tablet 0     escitalopram (LEXAPRO) 20 MG tablet Take 1 tablet (20 mg) by mouth daily 90 tablet 1     ibuprofen (ADVIL,MOTRIN) 600 MG tablet Take 1 tablet (600 mg) by mouth every 6 hours as needed for pain (mild) 30 tablet 0     clonazePAM (KLONOPIN) 0.5 MG tablet Take one at bedtime for sleep (Patient not taking: Reported on 12/11/2018) 10 tablet 0     guaiFENesin-codeine (ROBITUSSIN AC) 100-10 MG/5ML SOLN solution Take 5 mLs by mouth every 4 hours as needed for cough (Patient not taking: Reported on 12/11/2018) 120 mL 0     Allergies   Allergen Reactions     No Known Drug Allergies      Recent Labs   Lab Test 04/05/17  0840 07/11/16  2353 05/17/16  1119 10/30/15  1130  09/03/15  1750  10/26/12  1200  03/15/11  0700   LDL  --   --  86  --   --   --   --  85  --   --    HDL  --   --  56  --   --   --   --  62  --   --    TRIG  --   --  98  --   --   --   --  83  --   --    ALT  --  19  --  26  --   --   --   --   --   --    CR 0.61 0.63  --  0.62  --  0.73   < > 0.57   < >  --    GFRESTIMATED >90  Non  GFR Calc   >90  Non  GFR Calc    --  >90  Non  GFR Calc    --  107   < > 124   < >  --    GFRESTBLACK >90   GFR Calc   >90   GFR Calc    --  >90   GFR Calc     < >  --   --   --   --   --    POTASSIUM 4.4 3.4  --  3.6  --  3.9   < > 4.0   < >  --    TSH  --   --   --   --   --  3.05  --   --   --  2.42    < > = values in this interval not displayed.        Mammogram not appropriate for this patient based on age.    Pertinent mammograms are reviewed under the imaging tab.  History of abnormal Pap smear: NO - age 30- 65 PAP every 3 years recommended  PAP / HPV 6/8/2004 7/28/2003 7/16/2002   PAP DATE - [facility] DNR DNR DNR     Reviewed and updated as needed this visit by clinical staff  Tobacco  Allergies  Meds  Problems         Reviewed and updated as needed this visit by  "Provider        Past Medical History:   Diagnosis Date     Allergic state, subsequent encounter 7/12/2017     Depression      Hypertension      Morbid obesity, unspecified obesity type (H) 7/12/2017     Right foot pain      Sleep apnea     uses CPAP     Tibia/fibula fracture 4/23/2014      Past Surgical History:   Procedure Laterality Date     BUNIONECTOMY  12/5/2013    Procedure: BUNIONECTOMY;   correct of bunion and bunionette deformation right with oteotomies;  Surgeon: Tanesha Rosa DPM;  Location: [facility] OR     HC CORRECT BUNION,SIMPLE  12/2004    fusion metatarsal     HC EXCIS PRIMARY GANGLION WRIST  1994, 1995    left      HC EXCIS RECURRENT GANGLION WRIST   2009, 2012    right     HC REMOVE TONSILS/ADENOIDS,<13 Y/O      age 8     LAPAROSCOPIC CHOLECYSTECTOMY N/A 11/4/2015    Procedure: LAPAROSCOPIC CHOLECYSTECTOMY;  Surgeon: Alyson Horne MD;  Location: [facility] OR       ROS:  CONSTITUTIONAL: NEGATIVE for fever, chills, change in weight  INTEGUMENTARU/SKIN: NEGATIVE for worrisome rashes, moles or lesions  EYES: NEGATIVE for vision changes or irritation  ENT: NEGATIVE for ear, mouth and throat problems  RESP: NEGATIVE for significant cough or SOB  BREAST: NEGATIVE for masses, tenderness or discharge  CV: NEGATIVE for chest pain, or peripheral edema  GI: NEGATIVE for nausea, abdominal pain, heartburn, or change in bowel habits  MUSCULOSKELETAL: NEGATIVE for significant arthralgias or myalgia  NEURO: NEGATIVE for weakness, dizziness or paresthesias      OBJECTIVE:   /82 (BP Location: Right arm, Patient Position: Sitting, Cuff Size: Adult Regular)   Pulse 86   Temp 98.6  F (37  C) (Oral)   Ht 1.734 m (5' 8.25\")   Wt (!) 188.2 kg (415 lb)   SpO2 99%   BMI 62.64 kg/m      EXAM:  GENERAL:  alert and no distress  EYES: Eyes grossly normal to inspection, PERRL and conjunctivae and sclerae normal  HENT: ear canals and TM's normal, nose and mouth without ulcers or lesions  NECK: no adenopathy, no " asymmetry, masses, or scars and thyroid normal to palpation  RESP: lungs clear to auscultation - no rales, rhonchi or wheezes  BREAST: normal without masses, tenderness or nipple discharge and no palpable axillary masses or adenopathy  CV: regular rate and rhythm, normal S1 S2, no S3 or S4, no murmur, click or rub, no peripheral edema and peripheral pulses strong  ABDOMEN: soft, nontender, no hepatosplenomegaly, no masses and bowel sounds normal  MS: no gross musculoskeletal defects noted, no edema  SKIN: no suspicious lesions or rashes  NEURO: Normal strength and tone, mentation intact and speech normal  PSYCH: mentation appears normal, affect normal/bright    Pelvic exam not done due to OB referral and current menses      ASSESSMENT/PLAN:   (Z01.419) Encounter for gynecological examination without abnormal finding  (primary encounter diagnosis)  Comment: annual  Plan: VENOUS COLLECTION, Lipid Profile, Glucose         Fasting (BFP)            (K58.9) Irritable bowel syndrome, unspecified type  Comment: stable      (G47.33) Obstructive sleep apnea syndrome  Comment: stable      (E55.9) Vitamin D deficiency  Comment: stable      (F33.1) Major depressive disorder, recurrent episode, moderate (H)  Comment: worsened  Plan: OB/GYN REFERRAL, buPROPion (WELLBUTRIN XL) 150         MG 24 hr tablet, escitalopram (LEXAPRO) 20 MG         tablet        continue therapy  Take meds daily  RTC 4 weeks      (E66.01) Morbid obesity, unspecified obesity type (H)  Comment: stable  Plan: OB/GYN REFERRAL        Declines specialist referral    (E28.2) PCOS (polycystic ovarian syndrome) - per [facility]  Comment: referred  Plan: OB/GYN REFERRAL            (N92.1) Menorrhagia with irregular cycle  Comment: new  Plan: VENOUS COLLECTION, OB/GYN REFERRAL,         HEMOGRAM/PLATELET ([facility])        Referred to OBGYN for further workup  US done 2 years ago so not repeated today   HTN on estrogen  IUD advised     (K02.9) Tooth decay/ multiple cavities  "2013  Comment: needs appt      COUNSELING:   Special attention given to:        Regular exercise       Healthy diet/nutrition    BP Readings from Last 1 Encounters:   12/11/18 110/82     Estimated body mass index is 62.64 kg/m  as calculated from the following:    Height as of this encounter: 1.734 m (5' 8.25\").    Weight as of this encounter: 188.2 kg (415 lb).      Weight management plan: Discussed healthy diet and exercise guidelines     reports that  has never smoked. she has never used smokeless tobacco.      Counseling Resources:  ATP IV Guidelines  Pooled Cohorts Equation Calculator  Breast Cancer Risk Calculator  FRAX Risk Assessment  ICSI Preventive Guidelines  Dietary Guidelines for Americans, 2010  USDA's MyPlate  ASA Prophylaxis  Lung CA Screening    BRYAN Olson  [facility]  "

## 2018-12-11 NOTE — NURSING NOTE
Patient is here for a full physical exam.    Pre-Visit Screening :  Immunizations : up to date  Colon Screening : NA  Mammogram: NA  Asthma Action Test/Plan : No concerns  PHQ9 :  Given today   GAD7 :  Given today   Patient's  BMI There is no height or weight on file to calculate BMI.  Questioned patient about current smoking habits.  Pt. has never smoked.  OK to leave a detailed voice message regarding today's visit Yes, phone # [phone]      ETOH screening:  Questions:  1-How often do you have a drink containing alcohol?                             2 times per year(s)  2-How many drinks containing alcohol do you have on a typical day when you are         Drinking?                              1   3- How often do you have 5 or more drinks on one occasion?                              Never

## 2018-12-12 ENCOUNTER — MYC MEDICAL ADVICE (OUTPATIENT)
Dept: FAMILY MEDICINE | Facility: CLINIC | Age: 38
End: 2018-12-12

## 2018-12-12 LAB
CHOLEST SERPL-MCNC: 172 MG/DL
CHOLEST/HDLC SERPL: 3 (CALC)
HDLC SERPL-MCNC: 58 MG/DL
LDLC SERPL CALC-MCNC: 89 MG/DL (CALC)
NONHDLC SERPL-MCNC: 114 MG/DL (CALC)
TRIGL SERPL-MCNC: 146 MG/DL

## 2018-12-12 NOTE — TELEPHONE ENCOUNTER
From: Coretta Huitron  To: Bhavana Joseph PA  Sent: 12/12/2018 12:57 PM CST  Subject: Updates about my health    Got a second pill box and loaded it yesterday. 1 for supplements, 1 for my  must  daily meds. Took meds today.   I ll send you an update after my ob appt. I asked if I should still come if I have my period or not, they said yes.

## 2018-12-12 NOTE — TELEPHONE ENCOUNTER
From: Coretta Huitron  To: Bhavana Joseph PA  Sent: 12/12/2018 11:28 AM CST  Subject: Updates about my health    My appt with ob-gyn is next Wednesday.  Happy to hear my labs are all within normal levels!!  Doreen

## 2018-12-21 ENCOUNTER — TRANSFERRED RECORDS (OUTPATIENT)
Dept: FAMILY MEDICINE | Facility: CLINIC | Age: 38
End: 2018-12-21

## 2018-12-21 LAB — PAP SMEAR - HIM PATIENT REPORTED: NEGATIVE

## 2019-01-11 ENCOUNTER — MYC MEDICAL ADVICE (OUTPATIENT)
Dept: FAMILY MEDICINE | Facility: CLINIC | Age: 39
End: 2019-01-11

## 2019-01-14 NOTE — TELEPHONE ENCOUNTER
From: Coretta Huitron  Sent: 1/12/2019 3:00 PM CST  To: [facility]  Subject: RE:follow up    ----- Message from Carson Marquez sent at 1/12/2019 3:00 PM CST -----    Jhon Bateman,    I haven t seen the dentist yet but it s on my list to do in the next month. I saw the OB. Results of the sample were normal- no cancer or HPV. However, it showed I have bacterial vaginitis. They gave me a prescription for flagyl bid x7 days, and I just finished it. No other issues noted. She thought my inconsistent ssri use could definitely have contributed to my irregular periods. My other options are iud which she said I would have to have surgery to get it placed. She said if my periods continue to be heavy and painful I could get a repeat vaginal ultrasound to get checked for fibroids. She left that up to me. I haven t missed any of my ssri meds since I saw you and the period issue stopped. Yay!!   ----- Message -----  From: BRYAN Olson  Sent: 1/11/2019 9:12 AM CST  To: Coretta Huitron  Subject: follow up  Doreen - just wanted to follow up to see if you were seen by a dentist yet?    BRYAN Valdez-DUSTY  1/11/2019

## 2019-04-23 DIAGNOSIS — F33.1 MAJOR DEPRESSIVE DISORDER, RECURRENT EPISODE, MODERATE (H): ICD-10-CM

## 2019-04-24 RX ORDER — BUPROPION HYDROCHLORIDE 150 MG/1
TABLET ORAL
Qty: 30 TABLET | Refills: 0 | Status: SHIPPED | OUTPATIENT
Start: 2019-04-24 | End: 2019-05-21

## 2019-04-24 RX ORDER — ESCITALOPRAM OXALATE 20 MG/1
TABLET ORAL
Qty: 30 TABLET | Refills: 0 | Status: SHIPPED | OUTPATIENT
Start: 2019-04-24 | End: 2019-05-21

## 2019-04-24 NOTE — TELEPHONE ENCOUNTER
Pending Prescriptions:                       Disp   Refills    escitalopram (LEXAPRO) 20 MG tablet [Phar*30 tab*0            Sig: TAKE ONE TABLET BY MOUTH ONCE DAILY    buPROPion (WELLBUTRIN XL) 150 MG 24 hr ta*30 tab*0            Sig: TAKE ONE TABLET BY MOUTH EVERY MORNING    CER please review:    Pt was to rtc in 3 months per notes on 12-  Refills for 90 day   Pt due in March/April for ov med check  No future appts  Please fax 30 and send to [facility] let them know if you want pt to be fasting or not  Lorna  [phone] (home) [phone] (work)

## 2019-05-21 ENCOUNTER — OFFICE VISIT (OUTPATIENT)
Dept: FAMILY MEDICINE | Facility: CLINIC | Age: 39
End: 2019-05-21

## 2019-05-21 VITALS
BODY MASS INDEX: 63.64 KG/M2 | OXYGEN SATURATION: 98 % | HEART RATE: 81 BPM | TEMPERATURE: 98.2 F | DIASTOLIC BLOOD PRESSURE: 88 MMHG | SYSTOLIC BLOOD PRESSURE: 134 MMHG | WEIGHT: 293 LBS

## 2019-05-21 DIAGNOSIS — R00.2 PALPITATIONS: ICD-10-CM

## 2019-05-21 DIAGNOSIS — F41.0 PANIC ATTACK: ICD-10-CM

## 2019-05-21 DIAGNOSIS — F33.1 MAJOR DEPRESSIVE DISORDER, RECURRENT EPISODE, MODERATE (H): Primary | ICD-10-CM

## 2019-05-21 DIAGNOSIS — E66.01 MORBID OBESITY, UNSPECIFIED OBESITY TYPE (H): ICD-10-CM

## 2019-05-21 PROCEDURE — 99213 OFFICE O/P EST LOW 20 MIN: CPT | Performed by: PHYSICIAN ASSISTANT

## 2019-05-21 RX ORDER — MULTIVIT-MIN/IRON/FOLIC ACID/K 18-600-40
CAPSULE ORAL
COMMUNITY
End: 2022-09-23

## 2019-05-21 RX ORDER — CLONAZEPAM 1 MG/1
TABLET ORAL
Qty: 10 TABLET | Refills: 0 | Status: SHIPPED | OUTPATIENT
Start: 2019-05-21 | End: 2020-12-17

## 2019-05-21 RX ORDER — BUPROPION HYDROCHLORIDE 150 MG/1
150 TABLET ORAL EVERY MORNING
Qty: 90 TABLET | Refills: 1 | Status: SHIPPED | OUTPATIENT
Start: 2019-05-21 | End: 2019-12-30

## 2019-05-21 RX ORDER — ESCITALOPRAM OXALATE 20 MG/1
20 TABLET ORAL DAILY
Qty: 90 TABLET | Refills: 1 | Status: SHIPPED | OUTPATIENT
Start: 2019-05-21 | End: 2019-12-30

## 2019-05-21 RX ORDER — FEXOFENADINE HCL 180 MG/1
180 TABLET ORAL DAILY
COMMUNITY
End: 2024-07-03

## 2019-05-21 ASSESSMENT — PATIENT HEALTH QUESTIONNAIRE - PHQ9
5. POOR APPETITE OR OVEREATING: SEVERAL DAYS
SUM OF ALL RESPONSES TO PHQ QUESTIONS 1-9: 7

## 2019-05-21 ASSESSMENT — ANXIETY QUESTIONNAIRES
5. BEING SO RESTLESS THAT IT IS HARD TO SIT STILL: NOT AT ALL
6. BECOMING EASILY ANNOYED OR IRRITABLE: SEVERAL DAYS
7. FEELING AFRAID AS IF SOMETHING AWFUL MIGHT HAPPEN: SEVERAL DAYS
1. FEELING NERVOUS, ANXIOUS, OR ON EDGE: MORE THAN HALF THE DAYS
3. WORRYING TOO MUCH ABOUT DIFFERENT THINGS: SEVERAL DAYS
IF YOU CHECKED OFF ANY PROBLEMS ON THIS QUESTIONNAIRE, HOW DIFFICULT HAVE THESE PROBLEMS MADE IT FOR YOU TO DO YOUR WORK, TAKE CARE OF THINGS AT HOME, OR GET ALONG WITH OTHER PEOPLE: SOMEWHAT DIFFICULT
GAD7 TOTAL SCORE: 7
2. NOT BEING ABLE TO STOP OR CONTROL WORRYING: SEVERAL DAYS

## 2019-05-21 NOTE — NURSING NOTE
Doreen is here for med check    Pre-visit Screening:  Immunizations:  up to date  Colonoscopy:  NA  Mammogram: NA  Asthma Action Test/Plan:  NA  PHQ9:  Done today  GAD7:  Done today  Questioned patient about current smoking habits Pt. has never smoked.  Ok to leave detailed message on voice mail for today's visit only Yes, phone # [phone]

## 2019-05-21 NOTE — PROGRESS NOTES
"Subjective     Coretta Huitron is a 38 year old female who presents to clinic today for the following health issues:    In clinic today, I had a physician assistant student with me.  The student participated in the office visit with the permission of the patient.      HPI   Depression Followup  Wellbutrin and Lexapro were refilled on 4/24/19. Past 3 weeks.  Lost pill , past 2 weeks taking them as prescribed as she found her pill .     How are you doing with your depression since your last visit? Improved. Doing better since the last visit but you're still feeling very anxious. Most of this is work related. 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 Has met with 1/3 of the class reviewing their performance. The cliff also left so she is unfamiliar with the leadership.       Was previously on Xanax and was given clonazepam last year; this is better than the Xanax and works better. Doreen is out of this currently. Took 3 and lost the rest.     Are you having other symptoms that might be associated with depression? yes   Palpitations. These have been worsening her anxiety. She went \"completely panicky\" for 36 hours about this. She is getting them 1-2 x per week. She gets 1-2 at a time. No lightheadedness or short of breath with these. This has been happening for the past month. Typically occur towards night.   Dad had sinus arrhyhtmias with occasional PVCs. Paternal uncle a-fib with RVR.    Appetite is poor. No change in energy levels.        Have you had a significant life event?  Job Concerns     Are you feeling anxious or having panic attacks?   Yes:  Anxiety, panic attacs    Do you have any concerns with your use of alcohol or other drugs? No. Last drink was 2 weeks ago.     Wt Readings from Last 5 Encounters:   05/21/19 (!) 191.2 kg (421 lb 9.6 oz)   12/11/18 (!) 188.2 kg (415 lb)   10/25/17 (!) 191.9 kg (423 lb) "   08/23/17 (!) 187.2 kg (412 lb 9.6 oz)   08/21/17 (!) 186.9 kg (412 lb)     Social History     Tobacco Use     Smoking status: Never Smoker     Smokeless tobacco: Never Used   Substance Use Topics     Alcohol use: Yes     Alcohol/week: 0.0 oz     Comment: very rare     Drug use: No     PHQ 5/17/2016 7/12/2017 7/27/2018   PHQ-9 Total Score 7 5 5   Q9: Thoughts of better off dead/self-harm past 2 weeks Not at all Not at all Not at all     DANIA-7 SCORE 5/17/2016   Total Score 5       In the past two weeks have you had thoughts of suicide or self-harm?  No.    Do you have concerns about your personal safety or the safety of others?   No    Suicide Assessment Five-step Evaluation and Treatment (SAFE-T)    Amount of exercise or physical activity: None  She is going to counseling and her counselor is a . They have been trying yoga and Doreen does feel like this helps her. She does this twice per month. Has plans to turn loft into yoga space.     Problems taking medications regularly: Yes,  lost prescription and problems remembering to take    Medication side effects: none    Diet: regular (no restrictions)    Patient Active Problem List   Diagnosis     Irritable bowel syndrome     ACP (advance care planning)     Health Care Home     Obstructive sleep apnea syndrome     Vitamin D deficiency     Tooth decay/ multiple cavities 2013     Knee pain     Major depressive disorder, recurrent episode, moderate (H)     Allergic state, subsequent encounter     Morbid obesity, unspecified obesity type (H)     PCOS (polycystic ovarian syndrome) - per [facility]     Past Surgical History:   Procedure Laterality Date     BUNIONECTOMY  12/5/2013    Procedure: BUNIONECTOMY;   correct of bunion and bunionette deformation right with oteotomies;  Surgeon: Tanesha Rosa DPM;  Location: [facility]     HC CORRECT BUNION,SIMPLE  12/2004    fusion metatarsal     HC EXCIS PRIMARY GANGLION WRIST  1994, 1995    left      HC EXCIS RECURRENT  GANGLION WRIST   ,     right     HC REMOVE TONSILS/ADENOIDS,<11 Y/O      age 8     LAPAROSCOPIC CHOLECYSTECTOMY N/A 2015    Procedure: LAPAROSCOPIC CHOLECYSTECTOMY;  Surgeon: Alyson Horne MD;  Location: [facility] OR       Social History     Tobacco Use     Smoking status: Never Smoker     Smokeless tobacco: Never Used   Substance Use Topics     Alcohol use: Yes     Alcohol/week: 0.0 oz     Comment: very rare     Family History   Problem Relation Age of Onset     Cancer Maternal Grandfather      Cancer Paternal Grandfather      Colon Cancer Paternal Grandfather      Thyroid Disease Mother      Hypertension Father      Coronary Artery Disease Father 65        MI -  in      Sleep Apnea Father      Thyroid Disease Maternal Grandmother          Current Outpatient Medications   Medication Sig Dispense Refill     Acetaminophen (TYLENOL PO) Take 500 mg by mouth       Ascorbic Acid (VITAMIN C PO)        Ascorbic Acid (VITAMIN C) 500 MG CAPS        buPROPion (WELLBUTRIN XL) 150 MG 24 hr tablet TAKE ONE TABLET BY MOUTH EVERY MORNING 30 tablet 0     cholecalciferol (VITAMIN D3) 5000 UNITS CAPS capsule Take by mouth daily       cyclobenzaprine (FLEXERIL) 10 MG tablet Take one as needed for tension headaches 30 tablet 0     escitalopram (LEXAPRO) 20 MG tablet TAKE ONE TABLET BY MOUTH ONCE DAILY 30 tablet 0     fexofenadine (ALLEGRA) 180 MG tablet Take 180 mg by mouth daily       ibuprofen (ADVIL,MOTRIN) 600 MG tablet Take 1 tablet (600 mg) by mouth every 6 hours as needed for pain (mild) 30 tablet 0     Allergies   Allergen Reactions     No Known Drug Allergies      Recent Labs   Lab Test 18  1249 17  0840 16  2353 16  1119 10/30/15  1130  09/03/15  1750  10/26/12  1200  03/15/11  0700   LDL 89  --   --  86  --   --   --   --  85  --   --    HDL 58  --   --  56  --   --   --   --  62  --   --    TRIG 146  --   --  98  --   --   --   --  83  --   --    ALT  --   --    --    --    --   --   --   --   --    CR  --  0.61 0.63  --  0.62  --  0.73   < > 0.57   < >  --    GFRESTIMATED  --  >90  Non  GFR Calc   >90  Non  GFR Calc    --  >90  Non  GFR Calc    --  107   < > 124   < >  --    GFRESTBLACK  --  >90   GFR Calc   >90   GFR Calc    --  >90   GFR Calc     < >  --   --   --   --   --    POTASSIUM  --  4.4 3.4  --  3.6  --  3.9   < > 4.0   < >  --    TSH  --   --   --   --   --   --  3.05  --   --   --  2.42    < > = values in this interval not displayed.      BP Readings from Last 3 Encounters:   05/21/19 134/88   12/11/18 110/82   07/27/18 112/74    Wt Readings from Last 3 Encounters:   05/21/19 (!) 191.2 kg (421 lb 9.6 oz)   12/11/18 (!) 188.2 kg (415 lb)   10/25/17 (!) 191.9 kg (423 lb)         Reviewed and updated as needed this visit by Provider         Review of Systems   ROS COMP: Constitutional, HEENT, cardiovascular, pulmonary, gi and gu systems are negative, except as otherwise noted.      Objective    /88 (BP Location: Right arm, Patient Position: Sitting, Cuff Size: Adult Large)   Pulse 81   Temp 98.2  F (36.8  C) (Oral)   Wt (!) 191.2 kg (421 lb 9.6 oz)   SpO2 98%   BMI 63.64 kg/m    Body mass index is 63.64 kg/m .     Physical Exam   GENERAL: alert and no distress  EYES: Eyes grossly normal to inspection, PERRL and conjunctivae and sclerae normal  HENT: ear canals and TM's normal, nose and mouth without ulcers or lesions  NECK: no adenopathy, no asymmetry, masses, or scars and thyroid normal to palpation  RESP: lungs clear to auscultation - no rales, rhonchi or wheezes  CV: regular rate and rhythm, normal S1 S2, no S3 or S4, no murmur, click or rub, no peripheral edema and peripheral pulses strong  MS: no gross musculoskeletal defects noted, no edema  SKIN: no suspicious lesions or rashes  NEURO: Normal strength and tone, mentation intact and speech normal      Mental  "Status Exam:   Appearance: anxious  Grooming: adequately groomed  Demeanor: engaged, cooperative  Affect: normal  Speech: Normal.  Gait:Normal.  Movements: Normal  Form of thought: Logical, Linear and Goal directed  Thought content:  Normal  Insight:Good   Judgment: Good   Cognition: Good           Assessment & Plan     (F33.1) Major depressive disorder, recurrent episode, moderate (H)  (primary encounter diagnosis)  Comment: improving  Plan: escitalopram (LEXAPRO) 20 MG tablet, buPROPion         (WELLBUTRIN XL) 150 MG 24 hr tablet          Work on taking meds daily   Buy another pill box if needed    (E66.01) Morbid obesity, unspecified obesity type (H)  Comment: stable  Plan: declines referral to specialist    (F41.0) Panic attack  Comment: stabl  Plan: clonazePAM (KLONOPIN) 1 MG tablet     The adverse effects of this medication including sedation and dependency/addiction were discussed. Advised this medication is not to be used on a daily basis or for prolonged periods of time. Advised not to take while drinking, driving, making important decisions or watching children.       (R00.2) Palpitations  Comment: causing anxiety  Plan: Zio Patch Holter Adult Pediatric Greater than         48 hrs        Advised Zio Patch       BMI:   Estimated body mass index is 62.64 kg/m  as calculated from the following:    Height as of 12/11/18: 1.734 m (5' 8.25\").    Weight as of 12/11/18: 188.2 kg (415 lb).   Weight management plan: Discussed healthy diet and exercise guidelines        FUTURE APPOINTMENTS:       - Follow-up visit in 6 months fasting CPE    No follow-ups on file.    BRYAN Olson  [facility]      "

## 2019-05-21 NOTE — Clinical Note
Can you please coordinate this at [facility] rather than [facility]

## 2019-05-22 ASSESSMENT — ANXIETY QUESTIONNAIRES: GAD7 TOTAL SCORE: 7

## 2019-09-27 ENCOUNTER — HEALTH MAINTENANCE LETTER (OUTPATIENT)
Age: 39
End: 2019-09-27

## 2019-12-28 DIAGNOSIS — F33.1 MAJOR DEPRESSIVE DISORDER, RECURRENT EPISODE, MODERATE (H): ICD-10-CM

## 2019-12-30 RX ORDER — BUPROPION HYDROCHLORIDE 150 MG/1
TABLET ORAL
Qty: 11 TABLET | Refills: 0 | Status: SHIPPED | OUTPATIENT
Start: 2019-12-30 | End: 2020-01-09

## 2019-12-30 RX ORDER — ESCITALOPRAM OXALATE 20 MG/1
TABLET ORAL
Qty: 11 TABLET | Refills: 0 | Status: SHIPPED | OUTPATIENT
Start: 2019-12-30 | End: 2020-01-09

## 2019-12-30 NOTE — TELEPHONE ENCOUNTER
Pending Prescriptions:                       Disp   Refills    escitalopram (LEXAPRO) 20 MG tablet [Phar*11 tab*0            Sig: TAKE ONE TABLET BY MOUTH ONCE DAILY    buPROPion (WELLBUTRIN XL) 150 MG 24 hr ta*11 tab*0            Sig: TAKE ONE TABLET BY MOUTH EVERY MORNING    Per notes on 5-2019 rtc in six months for a fasting CPX  Pt has a med check on 1-9-2020  Please fax 11 and close encounter  Lorna  [phone] (home) [phone] (work)

## 2020-01-09 ENCOUNTER — OFFICE VISIT (OUTPATIENT)
Dept: FAMILY MEDICINE | Facility: CLINIC | Age: 40
End: 2020-01-09

## 2020-01-09 VITALS
HEART RATE: 102 BPM | OXYGEN SATURATION: 96 % | TEMPERATURE: 98.1 F | DIASTOLIC BLOOD PRESSURE: 84 MMHG | SYSTOLIC BLOOD PRESSURE: 134 MMHG

## 2020-01-09 DIAGNOSIS — G47.33 OBSTRUCTIVE SLEEP APNEA SYNDROME: ICD-10-CM

## 2020-01-09 DIAGNOSIS — F41.0 PANIC ATTACK: ICD-10-CM

## 2020-01-09 DIAGNOSIS — E66.01 MORBID OBESITY, UNSPECIFIED OBESITY TYPE (H): ICD-10-CM

## 2020-01-09 DIAGNOSIS — F33.1 MAJOR DEPRESSIVE DISORDER, RECURRENT EPISODE, MODERATE (H): Primary | ICD-10-CM

## 2020-01-09 PROCEDURE — 99213 OFFICE O/P EST LOW 20 MIN: CPT | Performed by: PHYSICIAN ASSISTANT

## 2020-01-09 RX ORDER — ESCITALOPRAM OXALATE 20 MG/1
20 TABLET ORAL DAILY
Qty: 90 TABLET | Refills: 3 | Status: SHIPPED | OUTPATIENT
Start: 2020-01-09 | End: 2020-12-17

## 2020-01-09 RX ORDER — AMOXICILLIN 875 MG
TABLET ORAL
COMMUNITY
Start: 2020-01-01 | End: 2022-01-18

## 2020-01-09 RX ORDER — BUPROPION HYDROCHLORIDE 150 MG/1
150 TABLET ORAL EVERY MORNING
Qty: 90 TABLET | Refills: 3 | Status: SHIPPED | OUTPATIENT
Start: 2020-01-09 | End: 2020-12-17 | Stop reason: ALTCHOICE

## 2020-01-09 ASSESSMENT — ANXIETY QUESTIONNAIRES
7. FEELING AFRAID AS IF SOMETHING AWFUL MIGHT HAPPEN: NOT AT ALL
GAD7 TOTAL SCORE: 4
2. NOT BEING ABLE TO STOP OR CONTROL WORRYING: SEVERAL DAYS
IF YOU CHECKED OFF ANY PROBLEMS ON THIS QUESTIONNAIRE, HOW DIFFICULT HAVE THESE PROBLEMS MADE IT FOR YOU TO DO YOUR WORK, TAKE CARE OF THINGS AT HOME, OR GET ALONG WITH OTHER PEOPLE: SOMEWHAT DIFFICULT
5. BEING SO RESTLESS THAT IT IS HARD TO SIT STILL: NOT AT ALL
6. BECOMING EASILY ANNOYED OR IRRITABLE: SEVERAL DAYS
3. WORRYING TOO MUCH ABOUT DIFFERENT THINGS: SEVERAL DAYS
1. FEELING NERVOUS, ANXIOUS, OR ON EDGE: SEVERAL DAYS

## 2020-01-09 ASSESSMENT — PATIENT HEALTH QUESTIONNAIRE - PHQ9
SUM OF ALL RESPONSES TO PHQ QUESTIONS 1-9: 8
5. POOR APPETITE OR OVEREATING: NOT AT ALL

## 2020-01-09 NOTE — NURSING NOTE
oDreen is here today for a med recheck.    Pre-visit Screening:  Immunizations:  up to date  Colonoscopy:  NA  Mammogram: NA  Asthma Action Test/Plan:  NA  PHQ9:  Done today  GAD7:  Done today  Questioned patient about current smoking habits Pt. has never smoked.  Ok to leave detailed message on voice mail for today's visit only Yes, phone # [phone]

## 2020-01-09 NOTE — PROGRESS NOTES
Subjective     Patient presents with:  Recheck Medication: non-fasting today      Coretta Huitron is a 39 year old female who presents to clinic today for the following health issues:    HPI     Depression and Anxiety Follow-Up    How are you doing with your depression since your last visit? No change    How are you doing with your anxiety since your last visit?  No change    Are you having other symptoms that might be associated with depression or anxiety? No    Have you had a significant life event? No     Do you have any concerns with your use of alcohol or other drugs? No       On Amoxicillin for sinus infection, improving    BP Readings from Last 6 Encounters:   01/09/20 134/84   05/21/19 134/88   12/11/18 110/82   07/27/18 112/74   12/20/17 130/84   10/25/17 120/80             Social History     Tobacco Use     Smoking status: Never Smoker     Smokeless tobacco: Never Used   Substance Use Topics     Alcohol use: Yes     Alcohol/week: 0.0 standard drinks     Comment: very rare     Drug use: No     PHQ 7/27/2018 5/21/2019 1/9/2020   PHQ-9 Total Score 5 7 8   Q9: Thoughts of better off dead/self-harm past 2 weeks Not at all Not at all Not at all     DANIA-7 SCORE 5/17/2016 5/21/2019 1/9/2020   Total Score 5 7 4     Last PHQ-9 1/9/2020   1.  Little interest or pleasure in doing things 1   2.  Feeling down, depressed, or hopeless 1   3.  Trouble falling or staying asleep, or sleeping too much 1   4.  Feeling tired or having little energy 2   5.  Poor appetite or overeating 1   6.  Feeling bad about yourself 1   7.  Trouble concentrating 1   8.  Moving slowly or restless 0   Q9: Thoughts of better off dead/self-harm past 2 weeks 0   PHQ-9 Total Score 8   Difficulty at work, home, or with people Somewhat difficult     DANIA-7  1/9/2020   1. Feeling nervous, anxious, or on edge 1   2. Not being able to stop or control worrying 1   3. Worrying too much about different things 1   4. Trouble relaxing 0   5. Being so  restless that it is hard to sit still 0   6. Becoming easily annoyed or irritable 1   7. Feeling afraid, as if something awful might happen 0   DANIA-7 Total Score 4   If you checked any problems, how difficult have they made it for you to do your work, take care of things at home, or get along with other people? Somewhat difficult     In the past two weeks have you had thoughts of suicide or self-harm?  No.    Do you have concerns about your personal safety or the safety of others?   No        There is no height or weight on file to calculate BMI.  BP Readings from Last 3 Encounters:   01/09/20 134/84   05/21/19 134/88   12/11/18 110/82    Wt Readings from Last 3 Encounters:   05/21/19 (!) 191.2 kg (421 lb 9.6 oz)   12/11/18 (!) 188.2 kg (415 lb)   10/25/17 (!) 191.9 kg (423 lb)            Patient Active Problem List   Diagnosis     Irritable bowel syndrome     ACP (advance care planning)     Health Care Home     Obstructive sleep apnea syndrome     Vitamin D deficiency     Tooth decay/ multiple cavities 2013     Knee pain     Major depressive disorder, recurrent episode, moderate (H)     Allergic state, subsequent encounter     Morbid obesity, unspecified obesity type (H)     PCOS (polycystic ovarian syndrome) - per [facility]     Past Surgical History:   Procedure Laterality Date     BUNIONECTOMY  12/5/2013    Procedure: BUNIONECTOMY;   correct of bunion and bunionette deformation right with oteotomies;  Surgeon: Tanesha Rosa DPM;  Location: [facility] OR     HC CORRECT BUNION,SIMPLE  12/2004    fusion metatarsal     HC EXCIS PRIMARY GANGLION WRIST  1994, 1995    left      HC EXCIS RECURRENT GANGLION WRIST   2009, 2012    right     HC REMOVE TONSILS/ADENOIDS,<13 Y/O      age 8     LAPAROSCOPIC CHOLECYSTECTOMY N/A 11/4/2015    Procedure: LAPAROSCOPIC CHOLECYSTECTOMY;  Surgeon: Alyson Horne MD;  Location: [facility] OR       Social History     Tobacco Use     Smoking status: Never Smoker     Smokeless tobacco: Never  Used   Substance Use Topics     Alcohol use: Yes     Alcohol/week: 0.0 standard drinks     Comment: very rare     Family History   Problem Relation Age of Onset     Cancer Maternal Grandfather      Cancer Paternal Grandfather      Colon Cancer Paternal Grandfather      Thyroid Disease Mother      Hypertension Father      Coronary Artery Disease Father 65        MI -  in      Sleep Apnea Father      Thyroid Disease Maternal Grandmother            Current Outpatient Medications   Medication Sig Dispense Refill     Acetaminophen (TYLENOL PO) Take 500 mg by mouth       amoxicillin (AMOXIL) 875 MG tablet        Ascorbic Acid (VITAMIN C) 500 MG CAPS        buPROPion (WELLBUTRIN XL) 150 MG 24 hr tablet Take 1 tablet (150 mg) by mouth every morning 90 tablet 3     cholecalciferol (VITAMIN D3) 5000 UNITS CAPS capsule Take by mouth daily       clonazePAM (KLONOPIN) 1 MG tablet Take one as needed for anxiety 10 tablet 0     cyclobenzaprine (FLEXERIL) 10 MG tablet Take one as needed for tension headaches 30 tablet 0     escitalopram (LEXAPRO) 20 MG tablet Take 1 tablet (20 mg) by mouth daily 90 tablet 3     fexofenadine (ALLEGRA) 180 MG tablet Take 180 mg by mouth daily       ibuprofen (ADVIL,MOTRIN) 600 MG tablet Take 1 tablet (600 mg) by mouth every 6 hours as needed for pain (mild) 30 tablet 0       Allergies   Allergen Reactions     No Known Drug Allergies        Recent Labs   Lab Test 18  1249 17  0840 16  2353 16  1119 10/30/15  1130  09/03/15  1750  10/26/12  1200   LDL 89  --   --  86  --   --   --   --  85   HDL 58  --   --  56  --   --   --   --  62   TRIG 146  --   --  98  --   --   --   --  83   ALT  --   --  19  --  26  --   --   --   --    CR  --  0.61 0.63  --  0.62  --  0.73   < > 0.57   GFRESTIMATED  --  >90  Non  GFR Calc   >90  Non  GFR Calc    --  >90  Non  GFR Calc    --  107   < > 124   GFRESTBLACK  --  >90    GFR Calc   >90   GFR Calc    --  >90   GFR Calc     < >  --   --   --    POTASSIUM  --  4.4 3.4  --  3.6  --  3.9   < > 4.0   TSH  --   --   --   --   --   --  3.05  --   --     < > = values in this interval not displayed.                 Review of Systems   ROS COMP: Constitutional, HEENT, cardiovascular, pulmonary, gi and gu systems are negative, except as otherwise noted.          Objective    /84 (BP Location: Other (Comment), Patient Position: Sitting, Cuff Size: Adult Large)   Pulse 102   Temp 98.1  F (36.7  C) (Oral)   SpO2 96%     Physical Exam     Mental Status Exam:   Appearance: calm  Grooming: adequately groomed  Demeanor: engaged, cooperative  Affect: normal  Speech: Normal.  Gait:Normal.  Movements: Normal  Form of thought: Logical, Linear and Goal directed  Thought content:  Normal  Insight:Good   Judgment: Good   Cognition: Good       Heart: Regular Rate and Rhythm.  No murmurs or extra sounds noted.  Lung: Lungs are clear to auscultation bilaterally.  No crackles, wheezes, or rhonchi noted.          Assessment & Plan       ICD-10-CM    1. Major depressive disorder, recurrent episode, moderate (H) F33.1 buPROPion (WELLBUTRIN XL) 150 MG 24 hr tablet     escitalopram (LEXAPRO) 20 MG tablet   2. Obstructive sleep apnea syndrome G47.33    3. Morbid obesity, unspecified obesity type (H) E66.01    4. Panic attack F41.0           Restart therapy  See me 1 year for fasting NOLBERTO Joseph, PA  [facility]

## 2020-01-10 ASSESSMENT — ANXIETY QUESTIONNAIRES: GAD7 TOTAL SCORE: 4

## 2020-02-06 ENCOUNTER — MYC MEDICAL ADVICE (OUTPATIENT)
Dept: FAMILY MEDICINE | Facility: CLINIC | Age: 40
End: 2020-02-06

## 2020-02-07 ENCOUNTER — HOSPITAL ENCOUNTER (OUTPATIENT)
Dept: LAB | Facility: CLINIC | Age: 40
Discharge: HOME | End: 2020-02-07
Attending: PHYSICIAN ASSISTANT | Admitting: PHYSICIAN ASSISTANT
Payer: OTHER MISCELLANEOUS

## 2020-02-07 ENCOUNTER — OFFICE VISIT (OUTPATIENT)
Dept: FAMILY MEDICINE | Facility: CLINIC | Age: 40
End: 2020-02-07

## 2020-02-07 VITALS
SYSTOLIC BLOOD PRESSURE: 132 MMHG | HEART RATE: 73 BPM | DIASTOLIC BLOOD PRESSURE: 82 MMHG | TEMPERATURE: 98.4 F | OXYGEN SATURATION: 98 %

## 2020-02-07 DIAGNOSIS — Z20.1 EXPOSURE TO TB: Primary | ICD-10-CM

## 2020-02-07 PROCEDURE — 99212 OFFICE O/P EST SF 10 MIN: CPT | Performed by: PHYSICIAN ASSISTANT

## 2020-02-07 PROCEDURE — 36415 COLL VENOUS BLD VENIPUNCTURE: CPT | Performed by: PHYSICIAN ASSISTANT

## 2020-02-07 PROCEDURE — 86481 TB AG RESPONSE T-CELL SUSP: CPT | Performed by: PHYSICIAN ASSISTANT

## 2020-02-07 NOTE — NURSING NOTE
Doreen is here for possible TB exposure from a past co-worker-- TB blood test today.          Pre-visit Screening:  Immunizations:  up to date  Colonoscopy:  NA  Mammogram: NA  Asthma Action Test/Plan:  None  PHQ9:  Done today  GAD7:  Done today  Questioned patient about current smoking habits Pt. has never smoked.  Ok to leave detailed message on voice mail for today's visit only Yes, phone # cell

## 2020-02-07 NOTE — PROGRESS NOTES
SUBJECTIVE:                                                    Coretta Huitron is a 39 year old female who presents to clinic today for the following health issues:    Chief Complaint   Patient presents with     Other     possible TB exposure from a past co-worker-- TB blood test today.     Conjunctivitis     woke up this morning with pink eye in the right eye.         Employer: 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  That teacher did not have any sx inlucding cough or fevers. My patient has been advised to be tested for TB.     Pts last CXR was 4/5/17 and was normal.    She is unable to tolerate skin test due to allergic reaction.     Pt denies any fevers or chronic.         BP Readings from Last 3 Encounters:   02/07/20 132/82   01/09/20 134/84   05/21/19 134/88    Wt Readings from Last 3 Encounters:   05/21/19 (!) 191.2 kg (421 lb 9.6 oz)   12/11/18 (!) 188.2 kg (415 lb)   10/25/17 (!) 191.9 kg (423 lb)            Patient Active Problem List   Diagnosis     Irritable bowel syndrome     ACP (advance care planning)     Health Care Home     Obstructive sleep apnea syndrome     Vitamin D deficiency     Tooth decay/ multiple cavities 2013     Knee pain     Major depressive disorder, recurrent episode, moderate (H)     Allergic state, subsequent encounter     Morbid obesity, unspecified obesity type (H)     PCOS (polycystic ovarian syndrome) - per [facility]     Past Surgical History:   Procedure Laterality Date     BUNIONECTOMY  12/5/2013    Procedure: BUNIONECTOMY;   correct of bunion and bunionette deformation right with oteotomies;  Surgeon: Tanesha Rosa DPM;  Location: [facility] OR     HC CORRECT BUNION,SIMPLE  12/2004    fusion metatarsal     HC EXCIS PRIMARY GANGLION WRIST  1994, 1995    left      HC EXCIS RECURRENT GANGLION WRIST   2009, 2012    right     HC REMOVE TONSILS/ADENOIDS,<13 Y/O      age 8     LAPAROSCOPIC CHOLECYSTECTOMY N/A 11/4/2015     Procedure: LAPAROSCOPIC CHOLECYSTECTOMY;  Surgeon: Alyson Horne MD;  Location:  OR       Social History     Tobacco Use     Smoking status: Never Smoker     Smokeless tobacco: Never Used   Substance Use Topics     Alcohol use: Yes     Alcohol/week: 0.0 standard drinks     Comment: very rare     Family History   Problem Relation Age of Onset     Cancer Maternal Grandfather      Cancer Paternal Grandfather      Colon Cancer Paternal Grandfather      Thyroid Disease Mother      Hypertension Father      Coronary Artery Disease Father 65        MI -  in 2015     Sleep Apnea Father      Thyroid Disease Maternal Grandmother          Current Outpatient Medications   Medication Sig Dispense Refill     Acetaminophen (TYLENOL PO) Take 500 mg by mouth       amoxicillin (AMOXIL) 875 MG tablet        Ascorbic Acid (VITAMIN C) 500 MG CAPS        buPROPion (WELLBUTRIN XL) 150 MG 24 hr tablet Take 1 tablet (150 mg) by mouth every morning 90 tablet 3     cholecalciferol (VITAMIN D3) 5000 UNITS CAPS capsule Take by mouth daily       clonazePAM (KLONOPIN) 1 MG tablet Take one as needed for anxiety 10 tablet 0     cyclobenzaprine (FLEXERIL) 10 MG tablet Take one as needed for tension headaches 30 tablet 0     escitalopram (LEXAPRO) 20 MG tablet Take 1 tablet (20 mg) by mouth daily 90 tablet 3     fexofenadine (ALLEGRA) 180 MG tablet Take 180 mg by mouth daily       ibuprofen (ADVIL,MOTRIN) 600 MG tablet Take 1 tablet (600 mg) by mouth every 6 hours as needed for pain (mild) 30 tablet 0       Allergies   Allergen Reactions     No Known Drug Allergies        OBJECTIVE:                                                    /82 (BP Location: Right arm, Patient Position: Sitting, Cuff Size: Adult Large)   Pulse 73   Temp 98.4  F (36.9  C) (Oral)   SpO2 98%  There is no height or weight on file to calculate BMI.     GENERAL: alert and no distress  RESP: lungs clear to auscultation - no crackles, no rhonchi, no  wheezes  CV: regular rates and rhythm, normal S1 S2, no S3 or S4 and no murmur, no click or rub -         ASSESSMENT/PLAN:                                                        ICD-10-CM    1. Exposure to TB Z20.1      Lab orders given for Quant. [facility] at [facility]  OK to RTW w/o restrictions    Bhavana Joseph PA-C  [facility]

## 2020-02-10 ENCOUNTER — TELEPHONE (OUTPATIENT)
Dept: FAMILY MEDICINE | Facility: CLINIC | Age: 40
End: 2020-02-10

## 2020-02-10 LAB
GAMMA INTERFERON BACKGROUND BLD IA-ACNC: 0.03 IU/ML
M TB IFN-G BLD-IMP: NEGATIVE
M TB IFN-G CD4+ BCKGRND COR BLD-ACNC: 5.94 IU/ML
MITOGEN IGNF BCKGRD COR BLD-ACNC: 0 IU/ML
MITOGEN IGNF BCKGRD COR BLD-ACNC: 0.01 IU/ML

## 2020-03-15 ENCOUNTER — HEALTH MAINTENANCE LETTER (OUTPATIENT)
Age: 40
End: 2020-03-15

## 2020-10-05 ENCOUNTER — TRANSFERRED RECORDS (OUTPATIENT)
Dept: FAMILY MEDICINE | Facility: CLINIC | Age: 40
End: 2020-10-05

## 2020-12-17 ENCOUNTER — OFFICE VISIT (OUTPATIENT)
Dept: FAMILY MEDICINE | Facility: CLINIC | Age: 40
End: 2020-12-17

## 2020-12-17 ENCOUNTER — MYC MEDICAL ADVICE (OUTPATIENT)
Dept: FAMILY MEDICINE | Facility: CLINIC | Age: 40
End: 2020-12-17

## 2020-12-17 VITALS
OXYGEN SATURATION: 99 % | TEMPERATURE: 98.4 F | HEART RATE: 66 BPM | SYSTOLIC BLOOD PRESSURE: 130 MMHG | WEIGHT: 293 LBS | DIASTOLIC BLOOD PRESSURE: 80 MMHG | BODY MASS INDEX: 62.49 KG/M2

## 2020-12-17 DIAGNOSIS — F41.0 PANIC ATTACK: ICD-10-CM

## 2020-12-17 DIAGNOSIS — E55.9 VITAMIN D DEFICIENCY: ICD-10-CM

## 2020-12-17 DIAGNOSIS — E28.2 PCOS (POLYCYSTIC OVARIAN SYNDROME): ICD-10-CM

## 2020-12-17 DIAGNOSIS — D50.9 IRON DEFICIENCY ANEMIA, UNSPECIFIED IRON DEFICIENCY ANEMIA TYPE: ICD-10-CM

## 2020-12-17 DIAGNOSIS — E66.01 MORBID OBESITY, UNSPECIFIED OBESITY TYPE (H): ICD-10-CM

## 2020-12-17 DIAGNOSIS — Z11.1 SCREENING EXAMINATION FOR PULMONARY TUBERCULOSIS: ICD-10-CM

## 2020-12-17 DIAGNOSIS — N91.1 SECONDARY AMENORRHEA: ICD-10-CM

## 2020-12-17 DIAGNOSIS — G47.33 OBSTRUCTIVE SLEEP APNEA SYNDROME: ICD-10-CM

## 2020-12-17 DIAGNOSIS — F33.1 MAJOR DEPRESSIVE DISORDER, RECURRENT EPISODE, MODERATE (H): Primary | ICD-10-CM

## 2020-12-17 LAB
ERYTHROCYTE [DISTWIDTH] IN BLOOD BY AUTOMATED COUNT: 12.7 %
HCT VFR BLD AUTO: 42.5 % (ref 35–47)
HEMOGLOBIN: 13.9 G/DL (ref 11.7–15.7)
MCH RBC QN AUTO: 33.2 PG (ref 26–33)
MCHC RBC AUTO-ENTMCNC: 32.7 G/DL (ref 31–36)
MCV RBC AUTO: 101.4 FL (ref 78–100)
PLATELET COUNT - QUEST: 327 10^9/L (ref 150–375)
RBC # BLD AUTO: 4.19 10*12/L (ref 3.8–5.2)
WBC # BLD AUTO: 7 10*9/L (ref 4–11)

## 2020-12-17 PROCEDURE — 71046 X-RAY EXAM CHEST 2 VIEWS: CPT | Performed by: PHYSICIAN ASSISTANT

## 2020-12-17 PROCEDURE — 83540 ASSAY OF IRON: CPT | Mod: 90 | Performed by: PHYSICIAN ASSISTANT

## 2020-12-17 PROCEDURE — 83550 IRON BINDING TEST: CPT | Mod: 90 | Performed by: PHYSICIAN ASSISTANT

## 2020-12-17 PROCEDURE — 84443 ASSAY THYROID STIM HORMONE: CPT | Mod: 90 | Performed by: PHYSICIAN ASSISTANT

## 2020-12-17 PROCEDURE — 83002 ASSAY OF GONADOTROPIN (LH): CPT | Mod: 90 | Performed by: PHYSICIAN ASSISTANT

## 2020-12-17 PROCEDURE — 85027 COMPLETE CBC AUTOMATED: CPT | Performed by: PHYSICIAN ASSISTANT

## 2020-12-17 PROCEDURE — 83001 ASSAY OF GONADOTROPIN (FSH): CPT | Mod: 90 | Performed by: PHYSICIAN ASSISTANT

## 2020-12-17 PROCEDURE — 84146 ASSAY OF PROLACTIN: CPT | Mod: 90 | Performed by: PHYSICIAN ASSISTANT

## 2020-12-17 PROCEDURE — 82728 ASSAY OF FERRITIN: CPT | Mod: 90 | Performed by: PHYSICIAN ASSISTANT

## 2020-12-17 PROCEDURE — 82306 VITAMIN D 25 HYDROXY: CPT | Mod: 90 | Performed by: PHYSICIAN ASSISTANT

## 2020-12-17 PROCEDURE — 99214 OFFICE O/P EST MOD 30 MIN: CPT | Performed by: PHYSICIAN ASSISTANT

## 2020-12-17 RX ORDER — MEDROXYPROGESTERONE ACETATE 10 MG
10 TABLET ORAL DAILY
Qty: 10 TABLET | Refills: 0 | Status: SHIPPED | OUTPATIENT
Start: 2020-12-17 | End: 2022-01-18

## 2020-12-17 RX ORDER — ESCITALOPRAM OXALATE 20 MG/1
20 TABLET ORAL DAILY
Qty: 90 TABLET | Refills: 3 | Status: SHIPPED | OUTPATIENT
Start: 2020-12-17 | End: 2021-10-05

## 2020-12-17 RX ORDER — CLONAZEPAM 1 MG/1
TABLET ORAL
Qty: 10 TABLET | Refills: 0 | Status: SHIPPED | OUTPATIENT
Start: 2020-12-17 | End: 2022-01-20

## 2020-12-17 RX ORDER — BUPROPION HYDROCHLORIDE 300 MG/1
300 TABLET ORAL EVERY MORNING
Qty: 90 TABLET | Refills: 0 | Status: SHIPPED | OUTPATIENT
Start: 2020-12-17 | End: 2021-02-25

## 2020-12-17 RX ORDER — BUPROPION HYDROCHLORIDE 150 MG/1
150 TABLET ORAL EVERY MORNING
Qty: 90 TABLET | Refills: 3 | Status: CANCELLED | OUTPATIENT
Start: 2020-12-17

## 2020-12-17 ASSESSMENT — ANXIETY QUESTIONNAIRES
5. BEING SO RESTLESS THAT IT IS HARD TO SIT STILL: NOT AT ALL
IF YOU CHECKED OFF ANY PROBLEMS ON THIS QUESTIONNAIRE, HOW DIFFICULT HAVE THESE PROBLEMS MADE IT FOR YOU TO DO YOUR WORK, TAKE CARE OF THINGS AT HOME, OR GET ALONG WITH OTHER PEOPLE: SOMEWHAT DIFFICULT
GAD7 TOTAL SCORE: 6
2. NOT BEING ABLE TO STOP OR CONTROL WORRYING: SEVERAL DAYS
3. WORRYING TOO MUCH ABOUT DIFFERENT THINGS: SEVERAL DAYS
7. FEELING AFRAID AS IF SOMETHING AWFUL MIGHT HAPPEN: SEVERAL DAYS
1. FEELING NERVOUS, ANXIOUS, OR ON EDGE: SEVERAL DAYS
6. BECOMING EASILY ANNOYED OR IRRITABLE: SEVERAL DAYS

## 2020-12-17 ASSESSMENT — PATIENT HEALTH QUESTIONNAIRE - PHQ9
SUM OF ALL RESPONSES TO PHQ QUESTIONS 1-9: 9
5. POOR APPETITE OR OVEREATING: SEVERAL DAYS

## 2020-12-17 NOTE — NURSING NOTE
Doreen is here for a med check and x-ray to check for TB.    Pre-Visit Screening:  Immunizations:UTD  Colonoscopy:NA  Mammogram:Na  Asthma Action Test/Plan:NA  PHQ9:today  GAD7:today  Questioned patient about current smoking habits Pt.never smoked  OK to leave a detailed message on voice mail for today's visit yes, phone # [phone]

## 2020-12-17 NOTE — PROGRESS NOTES
Subjective     Nursing Notes:   Mary Leo, JASKARAN  12/17/2020  1:34 PM  Signed  Doreen is here for a med check and x-ray to check for TB.    Pre-Visit Screening:  Immunizations:UTD  Colonoscopy:NA  Mammogram:Na  Asthma Action Test/Plan:NA  PHQ9:today  GAD7:today  Questioned patient about current smoking habits Pt.never smoked  OK to leave a detailed message on voice mail for today's visit yes, phone # [phone]           Coretta Huitron is a 40 year old female who presents to clinic today for the following health issues:    HPI       Vit D deficiency - 5000 Vit D      Depression Followup    How are you doing with your depression since your last visit? No change    Are you having other symptoms that might be associated with depression? No    Have you had a significant life event?  OTHER: COVID     Are you feeling anxious or having panic attacks?   Yes:  improved from the spring    Do you have any concerns with your use of alcohol or other drugs? No     Panic attacks were high in the spring - improved.     Needs CXR for TB screening - gets reaction to Mantoux.     Has GENESIS    Seeing therapist qo-week at [facility]    Female Fat solution with chiropractor this summer  24 hour Cortisol - LEVELS WERE HIGH - SHE WILL FAX THESE LEVELS  Thought to be leaky gut  Changed diet - BMs now normal.     Wt Readings from Last 5 Encounters:   12/17/20 (!) 187.8 kg (414 lb)   05/21/19 (!) 191.2 kg (421 lb 9.6 oz)   12/11/18 (!) 188.2 kg (415 lb)   10/25/17 (!) 191.9 kg (423 lb)   08/23/17 (!) 187.2 kg (412 lb 9.6 oz)       Aches improved  Supplements from Chiropractor  cortisol levels were high   Will be rechecking in Jan.     No menses in 4 months  Has hx of PCOS  Not on contraception  Never had period this late  Not sexually active      Hx of low Ferritin in the past - requesting labs          PHQ-9 SCORE 5/21/2019 1/9/2020 12/17/2020   PHQ-9 Total Score - - -   PHQ-9 Total Score 7 8 9         DANIA-7 SCORE 5/21/2019 1/9/2020  12/17/2020   Total Score 7 4 6           Social History     Tobacco Use     Smoking status: Never Smoker     Smokeless tobacco: Never Used   Substance Use Topics     Alcohol use: Yes     Alcohol/week: 0.0 standard drinks     Comment: very rare     Drug use: No     PHQ 5/21/2019 1/9/2020 12/17/2020   PHQ-9 Total Score 7 8 9   Q9: Thoughts of better off dead/self-harm past 2 weeks Not at all Not at all Not at all     DANIA-7 SCORE 5/21/2019 1/9/2020 12/17/2020   Total Score 7 4 6     Last PHQ-9 12/17/2020   1.  Little interest or pleasure in doing things 1   2.  Feeling down, depressed, or hopeless 1   3.  Trouble falling or staying asleep, or sleeping too much 1   4.  Feeling tired or having little energy 2   5.  Poor appetite or overeating 2   6.  Feeling bad about yourself 0   7.  Trouble concentrating 2   8.  Moving slowly or restless 0   Q9: Thoughts of better off dead/self-harm past 2 weeks 0   PHQ-9 Total Score 9   Difficulty at work, home, or with people Somewhat difficult     DANIA-7  12/17/2020   1. Feeling nervous, anxious, or on edge 1   2. Not being able to stop or control worrying 1   3. Worrying too much about different things 1   4. Trouble relaxing 1   5. Being so restless that it is hard to sit still 0   6. Becoming easily annoyed or irritable 1   7. Feeling afraid, as if something awful might happen 1   DANIA-7 Total Score 6   If you checked any problems, how difficult have they made it for you to do your work, take care of things at home, or get along with other people? Somewhat difficult     In the past two weeks have you had thoughts of suicide or self-harm?  No.    Do you have concerns about your personal safety or the safety of others?   No    Suicide Assessment Five-step Evaluation and Treatment (SAFE-T)          Review of Systems   Constitutional, HEENT, cardiovascular, pulmonary, gi and gu systems are negative, except as otherwise noted.      Patient Active Problem List   Diagnosis     Irritable  bowel syndrome     ACP (advance care planning)     Health Care Home     Obstructive sleep apnea syndrome     Vitamin D deficiency     Tooth decay/ multiple cavities      Knee pain     Major depressive disorder, recurrent episode, moderate (H)     Allergic state, subsequent encounter     Morbid obesity, unspecified obesity type (H)     PCOS (polycystic ovarian syndrome) - per [facility]     Past Surgical History:   Procedure Laterality Date     BUNIONECTOMY  2013    Procedure: BUNIONECTOMY;   correct of bunion and bunionette deformation right with oteotomies;  Surgeon: Tanesha Rosa DPM;  Location: [facility] OR     HC CORRECT BUNION,SIMPLE  2004    fusion metatarsal     HC EXCIS PRIMARY GANGLION WRIST  ,     left      HC EXCIS RECURRENT GANGLION WRIST   ,     right     HC REMOVE TONSILS/ADENOIDS,<13 Y/O      age 8     LAPAROSCOPIC CHOLECYSTECTOMY N/A 2015    Procedure: LAPAROSCOPIC CHOLECYSTECTOMY;  Surgeon: Alyson Horne MD;  Location: [facility] OR       Social History     Tobacco Use     Smoking status: Never Smoker     Smokeless tobacco: Never Used   Substance Use Topics     Alcohol use: Yes     Alcohol/week: 0.0 standard drinks     Comment: very rare     Family History   Problem Relation Age of Onset     Cancer Maternal Grandfather      Cancer Paternal Grandfather      Colon Cancer Paternal Grandfather      Thyroid Disease Mother      Hypertension Father      Coronary Artery Disease Father 65        MI -  in      Sleep Apnea Father      Thyroid Disease Maternal Grandmother            Current Outpatient Medications   Medication Sig Dispense Refill     Acetaminophen (TYLENOL PO) Take 500 mg by mouth       amoxicillin (AMOXIL) 875 MG tablet        Ascorbic Acid (VITAMIN C) 500 MG CAPS        cholecalciferol (VITAMIN D3) 5000 UNITS CAPS capsule Take by mouth daily       clonazePAM (KLONOPIN) 1 MG tablet Take one as needed for anxiety 10 tablet 0     cyclobenzaprine (FLEXERIL)  10 MG tablet Take one as needed for tension headaches 30 tablet 0     escitalopram (LEXAPRO) 20 MG tablet Take 1 tablet (20 mg) by mouth daily 90 tablet 3     fexofenadine (ALLEGRA) 180 MG tablet Take 180 mg by mouth daily       ibuprofen (ADVIL,MOTRIN) 600 MG tablet Take 1 tablet (600 mg) by mouth every 6 hours as needed for pain (mild) 30 tablet 0     Allergies   Allergen Reactions     No Known Drug Allergies      Recent Labs   Lab Test 12/11/18  1249 04/05/17  0840 07/11/16  2353 05/17/16  1119 10/30/15  1130 09/03/15  1750 09/03/15  1750 10/26/12  1200 10/26/12  1200   LDL 89  --   --  86  --   --   --   --  85   HDL 58  --   --  56  --   --   --   --  62   TRIG 146  --   --  98  --   --   --   --  83   ALT  --   --  19  --  26  --   --   --   --    CR  --  0.61 0.63  --  0.62  --  0.73   < > 0.57   GFRESTIMATED  --  >90  Non  GFR Calc   >90  Non  GFR Calc    --  >90  Non  GFR Calc    --  107   < > 124   GFRESTBLACK  --  >90   GFR Calc   >90   GFR Calc    --  >90   GFR Calc     < >  --   --   --    POTASSIUM  --  4.4 3.4  --  3.6  --  3.9   < > 4.0   TSH  --   --   --   --   --   --  3.05  --   --     < > = values in this interval not displayed.        BP Readings from Last 3 Encounters:   12/17/20 130/80   02/07/20 132/82   01/09/20 134/84    Wt Readings from Last 3 Encounters:   12/17/20 (!) 187.8 kg (414 lb)   05/21/19 (!) 191.2 kg (421 lb 9.6 oz)   12/11/18 (!) 188.2 kg (415 lb)              Objective    /80 (BP Location: Left arm, Patient Position: Sitting, Cuff Size: Adult Large)   Pulse 66   Temp 98.4  F (36.9  C) (Oral)   Wt (!) 187.8 kg (414 lb)   LMP  (LMP Unknown)   SpO2 99%   BMI 62.49 kg/m    Body mass index is 62.49 kg/m .  Physical Exam     GENERAL: healthy, alert and no distress  Head: Normocephalic, atraumatic.    RESP: lungs clear to auscultation - no rales, rhonchi or wheezes  CV:  regular rate and rhythm, normal S1 S2, no S3 or S4, no murmur, click or rub, no peripheral edema and peripheral pulses strong    Mental Status Exam:   Appearance: calm, tentative, anxious  Grooming: adequately groomed  Demeanor: engaged, cooperative  Affect: normal  Speech: Normal.  Gait:Normal.  Movements: Normal  Form of thought: Logical, Linear and Goal directed  Thought content:  Normal  Insight:Good   Judgment: Good   Cognition: Good       Assessment & Plan   1. Major depressive disorder, recurrent episode, moderate (H)  Inc dose wellbutrin to 300 mg  - escitalopram (LEXAPRO) 20 MG tablet; Take 1 tablet (20 mg) by mouth daily  Dispense: 90 tablet; Refill: 3  - buPROPion (WELLBUTRIN XL) 300 MG 24 hr tablet; Take 1 tablet (300 mg) by mouth every morning  Dispense: 90 tablet; Refill: 0    2. Morbid obesity, unspecified obesity type (H)      3. Obstructive sleep apnea syndrome      4. PCOS (polycystic ovarian syndrome) - per [facility]      5. Vitamin D deficiency    - Vitamin D deficiency screening ([facility])    6. Screening examination for pulmonary tuberculosis    - XR Chest 2 Views; Future  - XR Chest 2 Views    7. Panic attack    - clonazePAM (KLONOPIN) 1 MG tablet; Take one as needed for anxiety  Dispense: 10 tablet; Refill: 0    8. Secondary amenorrhea  NEW  LABS PENDING  IF NORMAL PROVERA 10 DAYS  I HAVE REQUESTED LABS FROM [facility]  - medroxyPROGESTERone (PROVERA) 10 MG tablet; Take 1 tablet (10 mg) by mouth daily  Dispense: 10 tablet; Refill: 0  - Follicle stimulating hormone  - Lutropin  - Prolactin  - TSH with free T4 reflex    9. Iron deficiency anemia, unspecified iron deficiency anemia type    - FERRITIN ([facility])  - Iron and iron binding capacity ([facility])  - Hemogram Platelet ([facility])        Follow-up Mychart in 3-4 weeks,   Appt in clinic or virtually in 6 weeks    BRYAN Olson  [facility]

## 2020-12-18 ENCOUNTER — TELEPHONE (OUTPATIENT)
Dept: FAMILY MEDICINE | Facility: CLINIC | Age: 40
End: 2020-12-18

## 2020-12-18 LAB
% SATURATION - QUEST: 25 % (CALC) (ref 16–45)
FERRITIN SERPL-MCNC: 48 NG/ML (ref 16–154)
FSH SERPL-ACNC: 18.7 MIU/ML
IRON: 82 MCG/DL (ref 40–190)
LH, SERUM: 18.9 MIU/ML
PROLACTIN - QUEST: 8.9 NG/ML
TIBC - QUEST: 326 MCG/DL (CALC) (ref 250–450)
TSH SERPL-ACNC: 2.2 MIU/L
VITAMIN D, 25-OH, TOTAL - QUEST: 27 NG/ML (ref 30–100)

## 2020-12-18 ASSESSMENT — ANXIETY QUESTIONNAIRES: GAD7 TOTAL SCORE: 6

## 2020-12-18 NOTE — TELEPHONE ENCOUNTER
Please call - can take up to every 8 hours as needed (3 x a day)    Bhavana Joseph PA-C  12/18/2020

## 2020-12-18 NOTE — TELEPHONE ENCOUNTER
Pharmacy called asking for clarification of the clonazepam, wondering if she can take it every 8 hours as needed or only 1 per day as needed. Thanks.

## 2021-01-05 ENCOUNTER — MYC MEDICAL ADVICE (OUTPATIENT)
Dept: FAMILY MEDICINE | Facility: CLINIC | Age: 41
End: 2021-01-05

## 2021-01-09 ENCOUNTER — HEALTH MAINTENANCE LETTER (OUTPATIENT)
Age: 41
End: 2021-01-09

## 2021-02-25 ENCOUNTER — OFFICE VISIT (OUTPATIENT)
Dept: FAMILY MEDICINE | Facility: CLINIC | Age: 41
End: 2021-02-25

## 2021-02-25 VITALS
OXYGEN SATURATION: 99 % | TEMPERATURE: 98 F | BODY MASS INDEX: 43.4 KG/M2 | SYSTOLIC BLOOD PRESSURE: 136 MMHG | WEIGHT: 293 LBS | HEART RATE: 76 BPM | HEIGHT: 69 IN | DIASTOLIC BLOOD PRESSURE: 86 MMHG

## 2021-02-25 DIAGNOSIS — M79.89 RIGHT AXILLARY SWELLING: Primary | ICD-10-CM

## 2021-02-25 DIAGNOSIS — F33.1 MAJOR DEPRESSIVE DISORDER, RECURRENT EPISODE, MODERATE (H): ICD-10-CM

## 2021-02-25 PROCEDURE — 99213 OFFICE O/P EST LOW 20 MIN: CPT | Performed by: PHYSICIAN ASSISTANT

## 2021-02-25 RX ORDER — BUPROPION HYDROCHLORIDE 300 MG/1
300 TABLET ORAL EVERY MORNING
Qty: 90 TABLET | Refills: 1 | Status: SHIPPED | OUTPATIENT
Start: 2021-02-25 | End: 2021-10-05

## 2021-02-25 ASSESSMENT — MIFFLIN-ST. JEOR: SCORE: 2607.86

## 2021-02-25 NOTE — NURSING NOTE
Doreen is here for right armpit swelling that hurts, started a week ago.    Pre-Visit Screening:  Immunizations:UTD  Colonoscopy:NA  Mammogram:NA  Asthma Action Test/Plan:NA  PHQ9:NA  GAD7:NA  Questioned patient about current smoking habits Pt.never smoked  OK to leave a detailed message on voice mail for today's visit yes, phone # [phone]

## 2021-02-25 NOTE — PROGRESS NOTES
"Assessment & Plan     Right axillary swelling  Needs to get proper bra fitting  Stop wearing bras until she can get this done and appropriate bra size  Continue to work on weight loss  If pain persists will order dg. Mammogram.       Major depressive disorder, recurrent episode, moderate (H)    - buPROPion (WELLBUTRIN XL) 300 MG 24 hr tablet; Take 1 tablet (300 mg) by mouth every morning        BRYAN Olson  [facility]    Subjective     Nursing Notes:   Mary Raymundo, JASKARAN  2/25/2021  5:34 PM  Signed  Doreen is here for right armpit swelling that hurts, started a week ago.    Pre-Visit Screening:  Immunizations:UTD  Colonoscopy:NA  Mammogram:NA  Asthma Action Test/Plan:NA  PHQ9:NA  GAD7:NA  Questioned patient about current smoking habits Pt.never smoked  OK to leave a detailed message on voice mail for today's visit yes, phone # [phone]          Coretta Huitron is a 40 year old female who presents to clinic today for the following health issues:     HPI       Here with concerns for pain in right axilla  Sx started 1-2 weeks ago  She did notice some swelling in the area  But no redness or discrete mass.     Did dry brushing/lymphatic area on Monday  Pain better  Swelling still present    No drainage  No lump    Has never had mammogram    New bras in the last month  No trauma to the area  No lifting    No fevers.    No family hx breast Cancer    Inc. Wellbutrin last OV  Doing much better \"birds are singing again\"!        Objective    /86 (BP Location: Right arm, Patient Position: Sitting, Cuff Size: Adult Large)   Pulse 76   Temp 98  F (36.7  C) (Oral)   Ht 1.746 m (5' 8.75\")   Wt (!) 187.2 kg (412 lb 12.8 oz)   LMP 02/14/2021 (Exact Date)   SpO2 99%   BMI 61.40 kg/m    Body mass index is 61.4 kg/m .  Physical Exam         Breasts:   Right:skin without rash, no dominant mass, no nipple discharge, or axillary adenopathy. No tenderness to palpation.  Left: skin " without rash, no dominant mass, no nipple discharge, or axillary adenopathy. No tenderness to palpation. Very mild swelling of skin just below axilla compared to left side.  Indentation from bra line present in area of concern.      Mental Status Exam:   Appearance: calm  Grooming: adequately groomed  Demeanor: engaged, cooperative  Affect: normal  Speech: Normal.  Gait:Normal.  Movements: Normal  Form of thought: Logical, Linear and Goal directed  Thought content:  Normal  Insight:Good   Judgment: Good   Cognition: Good

## 2021-05-08 ENCOUNTER — HEALTH MAINTENANCE LETTER (OUTPATIENT)
Age: 41
End: 2021-05-08

## 2021-10-03 DIAGNOSIS — F33.1 MAJOR DEPRESSIVE DISORDER, RECURRENT EPISODE, MODERATE (H): ICD-10-CM

## 2021-10-05 ENCOUNTER — OFFICE VISIT (OUTPATIENT)
Dept: FAMILY MEDICINE | Facility: CLINIC | Age: 41
End: 2021-10-05

## 2021-10-05 VITALS
WEIGHT: 293 LBS | HEART RATE: 74 BPM | DIASTOLIC BLOOD PRESSURE: 86 MMHG | TEMPERATURE: 98 F | OXYGEN SATURATION: 98 % | SYSTOLIC BLOOD PRESSURE: 140 MMHG | BODY MASS INDEX: 65.87 KG/M2

## 2021-10-05 DIAGNOSIS — E55.9 VITAMIN D DEFICIENCY: ICD-10-CM

## 2021-10-05 DIAGNOSIS — F33.1 MAJOR DEPRESSIVE DISORDER, RECURRENT EPISODE, MODERATE (H): Primary | ICD-10-CM

## 2021-10-05 DIAGNOSIS — N91.2 AMENORRHEA: ICD-10-CM

## 2021-10-05 DIAGNOSIS — E66.01 MORBID OBESITY, UNSPECIFIED OBESITY TYPE (H): ICD-10-CM

## 2021-10-05 DIAGNOSIS — R03.0 ELEVATED BLOOD PRESSURE READING WITHOUT DIAGNOSIS OF HYPERTENSION: ICD-10-CM

## 2021-10-05 DIAGNOSIS — G47.33 OBSTRUCTIVE SLEEP APNEA SYNDROME: ICD-10-CM

## 2021-10-05 DIAGNOSIS — Z23 NEED FOR VACCINATION: ICD-10-CM

## 2021-10-05 PROCEDURE — 99214 OFFICE O/P EST MOD 30 MIN: CPT | Performed by: PHYSICIAN ASSISTANT

## 2021-10-05 PROCEDURE — 90686 IIV4 VACC NO PRSV 0.5 ML IM: CPT | Performed by: PHYSICIAN ASSISTANT

## 2021-10-05 PROCEDURE — 90471 IMMUNIZATION ADMIN: CPT | Performed by: PHYSICIAN ASSISTANT

## 2021-10-05 RX ORDER — ESCITALOPRAM OXALATE 20 MG/1
TABLET ORAL
Qty: 330 TABLET | COMMUNITY
Start: 2021-10-05

## 2021-10-05 RX ORDER — BUPROPION HYDROCHLORIDE 300 MG/1
TABLET ORAL
Qty: 90 TABLET | Refills: 1 | COMMUNITY
Start: 2021-10-05

## 2021-10-05 RX ORDER — ESCITALOPRAM OXALATE 20 MG/1
20 TABLET ORAL DAILY
Qty: 90 TABLET | Refills: 3 | Status: SHIPPED | OUTPATIENT
Start: 2021-10-05 | End: 2022-01-20 | Stop reason: ALTCHOICE

## 2021-10-05 RX ORDER — BUPROPION HYDROCHLORIDE 300 MG/1
300 TABLET ORAL EVERY MORNING
Qty: 90 TABLET | Refills: 3 | Status: SHIPPED | OUTPATIENT
Start: 2021-10-05 | End: 2022-09-29

## 2021-10-05 ASSESSMENT — ANXIETY QUESTIONNAIRES
3. WORRYING TOO MUCH ABOUT DIFFERENT THINGS: SEVERAL DAYS
GAD7 TOTAL SCORE: 3
7. FEELING AFRAID AS IF SOMETHING AWFUL MIGHT HAPPEN: NOT AT ALL
6. BECOMING EASILY ANNOYED OR IRRITABLE: NOT AT ALL
1. FEELING NERVOUS, ANXIOUS, OR ON EDGE: SEVERAL DAYS
2. NOT BEING ABLE TO STOP OR CONTROL WORRYING: SEVERAL DAYS
IF YOU CHECKED OFF ANY PROBLEMS ON THIS QUESTIONNAIRE, HOW DIFFICULT HAVE THESE PROBLEMS MADE IT FOR YOU TO DO YOUR WORK, TAKE CARE OF THINGS AT HOME, OR GET ALONG WITH OTHER PEOPLE: NOT DIFFICULT AT ALL
5. BEING SO RESTLESS THAT IT IS HARD TO SIT STILL: NOT AT ALL

## 2021-10-05 ASSESSMENT — PATIENT HEALTH QUESTIONNAIRE - PHQ9
SUM OF ALL RESPONSES TO PHQ QUESTIONS 1-9: 7
5. POOR APPETITE OR OVEREATING: NOT AT ALL

## 2021-10-05 NOTE — PROGRESS NOTES
Assessment & Plan     Major depressive disorder, recurrent episode, moderate (H)  controlled  - buPROPion (WELLBUTRIN XL) 300 MG 24 hr tablet; Take 1 tablet (300 mg) by mouth every morning  - escitalopram (LEXAPRO) 20 MG tablet; Take 1 tablet (20 mg) by mouth daily    Morbid obesity, unspecified obesity type (H)      Obstructive sleep apnea syndrome      Vitamin D deficiency      Need for vaccination    - FLU VAC PRESRV FREE QUAD SPLIT VIR 3+YRS IM    Amenorrhea  if no menses 12 weeks will order progesterone for 10 days  Consider metformin - pt will call if wanting to start    Elevated blood pressure reading without diagnosis of hypertension  Follow-up in 3 months  Low salt diet  Walking journal  Goal  Is 15 min 3x a week            BRYAN Olson  [facility]    Subjective     Nursing Notes:   Mary Raymundo CMA  10/5/2021 10:07 AM  Signed  Chief Complaint   Patient presents with     Recheck Medication     fasting      Blood Pressure Check     Covid Concern     2 months late for monthly wonders if its from covid shot          Pre-Visit Screening:  Immunizations:flu shot  Colonoscopy:NA  Mammogram:Na  Asthma Action Test/Plan:Na  PHQ9:today  GAD7:today  Questioned patient about current smoking habits Pt.never  OK to leave a detailed message on voice mail for today's visit yes, phone # [phone]  APC dicussed           Coretta Huitron is a 41 year old female who presents to clinic today for the following health issues     HPI       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  Less stress  Started in July  Teach is In person  Home monitor 120s for BP   But at school some readings 160s  Thinks she has white coat HTN    Weight gain in the last 6 months + 30lbs  Minimal exercise        Wt Readings from Last 5 Encounters:   10/05/21 (!) 200.9 kg (442 lb 12.8 oz)   02/25/21 (!) 187.2 kg (412 lb 12.8 oz)   12/17/20 (!) 187.8 kg (414 lb)   05/21/19 (!) 191.2 kg (421 lb 9.6 oz)    12/11/18 (!) 188.2 kg (415 lb)       BP Readings from Last 6 Encounters:   10/05/21 (!) 140/86   02/25/21 136/86   12/17/20 130/80   02/07/20 132/82   01/09/20 134/84   05/21/19 134/88       No menses 2 months  Has PCOS  Sept COVID vaccine  q4-6 weeks menses  Last year hormone testing normal after 3 months no cycle  Off OCP        Depression Followup  Now seeing Светлана at [facility]    How are you doing with your depression since your last visit? No change    Are you having other symptoms that might be associated with depression? No    Have you had a significant life event?  Job Concerns     Are you feeling anxious or having panic attacks?   No    Do you have any concerns with your use of alcohol or other drugs? No    Social History     Tobacco Use     Smoking status: Never Smoker     Smokeless tobacco: Never Used   Substance Use Topics     Alcohol use: Yes     Alcohol/week: 0.0 standard drinks     Comment: very rare     Drug use: No     PHQ 5/21/2019 1/9/2020 12/17/2020   PHQ-9 Total Score 7 8 9   Q9: Thoughts of better off dead/self-harm past 2 weeks Not at all Not at all Not at all     DANIA-7 SCORE 5/21/2019 1/9/2020 12/17/2020   Total Score 7 4 6         Current Medications  Current Outpatient Medications   Medication Sig Dispense Refill     Acetaminophen (TYLENOL PO) Take 500 mg by mouth       amoxicillin (AMOXIL) 875 MG tablet        Ascorbic Acid (VITAMIN C) 500 MG CAPS        buPROPion (WELLBUTRIN XL) 300 MG 24 hr tablet Take 1 tablet (300 mg) by mouth every morning 90 tablet 1     cholecalciferol (VITAMIN D3) 5000 UNITS CAPS capsule Take by mouth daily       clonazePAM (KLONOPIN) 1 MG tablet Take one as needed for anxiety 10 tablet 0     cyclobenzaprine (FLEXERIL) 10 MG tablet Take one as needed for tension headaches 30 tablet 0     escitalopram (LEXAPRO) 20 MG tablet Take 1 tablet (20 mg) by mouth daily 90 tablet 3     fexofenadine (ALLEGRA) 180 MG tablet Take 180 mg by mouth daily        ibuprofen (ADVIL,MOTRIN) 600 MG tablet Take 1 tablet (600 mg) by mouth every 6 hours as needed for pain (mild) 30 tablet 0     medroxyPROGESTERone (PROVERA) 10 MG tablet Take 1 tablet (10 mg) by mouth daily 10 tablet 0         Constitutional, HEENT, Cardiovascular, Pulmonary, GI and  systems are negative, except as otherwise noted.          Objective    BP (!) 140/86 (BP Location: Left arm, Patient Position: Sitting, Cuff Size: Adult Large)   Pulse 74   Temp 98  F (36.7  C) (Oral)   Wt (!) 200.9 kg (442 lb 12.8 oz)   LMP 08/06/2021 (Approximate)   SpO2 98%   BMI 65.87 kg/m    Body mass index is 65.87 kg/m .  Physical Exam   GENERAL: healthy, alert and no distress  EYES: Eyes grossly normal to inspection, PERRL and conjunctivae and sclerae normal  HENT: ear canals and TM's normal, nose and mouth without ulcers or lesions  NECK: no adenopathy, no asymmetry, masses, or scars and thyroid normal to palpation  RESP: lungs clear to auscultation - no rales, rhonchi or wheezes  CV: regular rate and rhythm, normal S1 S2, no S3 or S4, no murmur, click or rub, no peripheral edema and peripheral pulses strong      Mental Status Exam:   Appearance: calm  Grooming: adequately groomed  Demeanor: engaged, cooperative  Affect: normal  Speech: Normal.  Gait:Normal.  Movements: Normal  Form of thought: Logical, Linear and Goal directed  Thought content:  Normal  Insight:Good   Judgment: Good   Cognition: Good     Breasts:   Right:skin without rash, no dominant mass, no nipple discharge, or axillary adenopathy. No tenderness to palpation.  Left: skin without rash, no dominant mass, no nipple discharge, or axillary adenopathy. No tenderness to palpation.

## 2021-10-05 NOTE — NURSING NOTE
Chief Complaint   Patient presents with     Recheck Medication     fasting      Blood Pressure Check     Covid Concern     2 months late for monthly wonders if its from covid shot          Pre-Visit Screening:  Immunizations:flu shot  Colonoscopy:NA  Mammogram:Na  Asthma Action Test/Plan:Na  PHQ9:today  GAD7:today  Questioned patient about current smoking habits Pt.never  OK to leave a detailed message on voice mail for today's visit yes, phone # [phone]  APC dicussed

## 2021-10-05 NOTE — TELEPHONE ENCOUNTER
Pt has appt today     Refused Prescriptions:                       Disp   Refills    buPROPion (WELLBUTRIN XL) 300 MG 24 hr tab*90 tab*1        Sig: TAKE 1 TABLET(300 MG) BY MOUTH EVERY MORNING  Refused By: CIRO MAC  Reason for Refusal: OTHER  Reason for Refusal Comment: will fill at appt today     escitalopram (LEXAPRO) 20 MG tablet [Pharm*330 ta*         Sig: TAKE 1 TABLET BY MOUTH EVERY DAY.  Refused By: CIRO MAC  Reason for Refusal: OTHER

## 2021-10-05 NOTE — LETTER
My Depression Action Plan  Name: Coretta Huitrno   Date of Birth 1980  Date: 10/5/2021    My doctor: Bhavana Joseph   My clinic: [facility]  [address] [place] [address]  [address]  [facility] [id number] [phone]          GREEN    ZONE   Good Control    What it looks like:     Things are going generally well. You have normal ups and downs. You may even feel depressed from time to time, but bad moods usually last less than a day.   What you need to do:  1. Continue to care for yourself (see self care plan)  2. Check your depression survival kit and update it as needed  3. Follow your physician s recommendations including any medication.  4. Do not stop taking medication unless you consult with your physician first.           YELLOW         ZONE Getting Worse    What it looks like:     Depression is starting to interfere with your life.     It may be hard to get out of bed; you may be starting to isolate yourself from others.    Symptoms of depression are starting to last most all day and this has happened for several days.     You may have suicidal thoughts but they are not constant.   What you need to do:     1. Call your care team. Your response to treatment will improve if you keep your care team informed of your progress. Yellow periods are signs an adjustment may need to be made.     2. Continue your self-care.  Just get dressed and ready for the day.  Don't give yourself time to talk yourself out of it.    3. Talk to someone in your support network.    4. Open up your Depression Self-Care Plan/Wellness Kit.           RED    ZONE Medical Alert - Get Help    What it looks like:     Depression is seriously interfering with your life.     You may experience these or other symptoms: You can t get out of bed most days, can t work or engage in other necessary activities, you have trouble taking care of basic hygiene, or basic responsibilities, thoughts of suicide or  death that will not go away, self-injurious behavior.     What you need to do:  1. Call your care team and request a same-day appointment. If they are not available (weekends or after hours) call your local crisis line, emergency room or 911.          Depression Self-Care Plan / Wellness Kit    Many people find that medication and therapy are helpful treatments for managing depression. In addition, making small changes to your everyday life can help to boost your mood and improve your wellbeing. Below are some tips for you to consider. Be sure to talk with your medical provider and/or behavioral health consultant if your symptoms are worsening or not improving.     Sleep   Sleep hygiene  means all of the habits that support good, restful sleep. It includes maintaining a consistent bedtime and wake time, using your bedroom only for sleeping or sex, and keeping the bedroom dark and free of distractions like a computer, smartphone, or television.     Develop a Healthy Routine  Maintain good hygiene. Get out of bed in the morning, make your bed, brush your teeth, take a shower, and get dressed. Don t spend too much time viewing media that makes you feel stressed. Find time to relax each day.    Exercise  Get some form of exercise every day. This will help reduce pain and release endorphins, the  feel good  chemicals in your brain. It can be as simple as just going for a walk or doing some gardening, anything that will get you moving.      Diet  Strive to eat healthy foods, including fruits and vegetables. Drink plenty of water. Avoid excessive sugar, caffeine, alcohol, and other mood-altering substances.     Stay Connected with Others  Stay in touch with friends and family members.    Manage Your Mood  Try deep breathing, massage therapy, biofeedback, or meditation. Take part in fun activities when you can. Try to find something to smile about each day.     Psychotherapy  Be open to working with a therapist if your  provider recommends it.     Medication  Be sure to take your medication as prescribed. Most anti-depressants need to be taken every day. It usually takes several weeks for medications to work. Not all medicines work for all people. It is important to follow-up with your provider to make sure you have a treatment plan that is working for you. Do not stop your medication abruptly without first discussing it with your provider.    Crisis Resources   These hotlines are for both adults and children. They and are open 24 hours a day, 7 days a week unless noted otherwise.      National Suicide Prevention Lifeline   7-186-916-OERZ (5443)      Crisis Text Line    www.crisistextline.org  Text HOME to 750792 from anywhere in the [place], anytime, about any type of crisis. A live, trained crisis counselor will receive the text and respond quickly.      [facility] Lifeline for LGBTQ Youth  A national crisis intervention and suicide lifeline for LGBTQ youth under 25. Provides a safe place to talk without judgement. Call 1-808.124.8600; text START to 518307 or visit www.thetrevorproject.org to talk to a trained counselor.      For [facility] crisis numbers, visit the [facility] website at:  https://[place].gov/dhs/people-we-serve/adults/health-care/mental-health/resources/crisis-contacts.jsp

## 2021-10-06 ASSESSMENT — ANXIETY QUESTIONNAIRES: GAD7 TOTAL SCORE: 3

## 2021-11-02 ENCOUNTER — MYC MEDICAL ADVICE (OUTPATIENT)
Dept: FAMILY MEDICINE | Facility: CLINIC | Age: 41
End: 2021-11-02

## 2021-11-02 DIAGNOSIS — E28.2 PCOS (POLYCYSTIC OVARIAN SYNDROME): ICD-10-CM

## 2021-11-02 DIAGNOSIS — N91.1 SECONDARY AMENORRHEA: Primary | ICD-10-CM

## 2021-11-03 RX ORDER — MEDROXYPROGESTERONE ACETATE 10 MG
10 TABLET ORAL DAILY
Qty: 10 TABLET | Refills: 0 | Status: SHIPPED | OUTPATIENT
Start: 2021-11-03 | End: 2022-01-18

## 2021-12-29 ENCOUNTER — TRANSFERRED RECORDS (OUTPATIENT)
Dept: FAMILY MEDICINE | Facility: CLINIC | Age: 41
End: 2021-12-29

## 2022-01-17 ENCOUNTER — MYC MEDICAL ADVICE (OUTPATIENT)
Dept: FAMILY MEDICINE | Facility: CLINIC | Age: 42
End: 2022-01-17

## 2022-01-18 ENCOUNTER — OFFICE VISIT (OUTPATIENT)
Dept: FAMILY MEDICINE | Facility: CLINIC | Age: 42
End: 2022-01-18

## 2022-01-18 VITALS
DIASTOLIC BLOOD PRESSURE: 84 MMHG | BODY MASS INDEX: 43.4 KG/M2 | TEMPERATURE: 98.2 F | HEART RATE: 90 BPM | OXYGEN SATURATION: 98 % | SYSTOLIC BLOOD PRESSURE: 142 MMHG | HEIGHT: 69 IN | WEIGHT: 293 LBS

## 2022-01-18 DIAGNOSIS — R00.2 PALPITATIONS: ICD-10-CM

## 2022-01-18 DIAGNOSIS — R09.89 RUNNY NOSE: ICD-10-CM

## 2022-01-18 DIAGNOSIS — F41.9 ANXIETY: Primary | ICD-10-CM

## 2022-01-18 DIAGNOSIS — F41.0 PANIC ATTACK: ICD-10-CM

## 2022-01-18 LAB
% GRANULOCYTES: 70.3 %
BUN SERPL-MCNC: 10 MG/DL (ref 7–25)
BUN/CREATININE RATIO: 11.6 (ref 6–22)
CALCIUM SERPL-MCNC: 9.3 MG/DL (ref 8.6–10.3)
CHLORIDE SERPLBLD-SCNC: 107.4 MMOL/L (ref 98–110)
CO2 SERPL-SCNC: 24 MMOL/L (ref 20–32)
COVID-19: NEGATIVE
CREAT SERPL-MCNC: 0.86 MG/DL (ref 0.6–1.3)
GLUCOSE SERPL-MCNC: 102 MG/DL (ref 60–99)
HCT VFR BLD AUTO: 46.1 % (ref 35–47)
HEMOGLOBIN: 15.4 G/DL (ref 11.7–15.7)
LYMPHOCYTES NFR BLD AUTO: 21.5 %
MCH RBC QN AUTO: 33.3 PG (ref 26–33)
MCHC RBC AUTO-ENTMCNC: 33.4 G/DL (ref 31–36)
MCV RBC AUTO: 99.7 FL (ref 78–100)
MONOCYTES NFR BLD AUTO: 8.2 %
PLATELET COUNT - QUEST: 313 10^9/L (ref 150–375)
POTASSIUM SERPL-SCNC: 4.28 MMOL/L (ref 3.5–5.3)
RBC # BLD AUTO: 4.62 10*12/L (ref 3.8–5.2)
SODIUM SERPL-SCNC: 142.2 MMOL/L (ref 135–146)
WBC # BLD AUTO: 7 10*9/L

## 2022-01-18 PROCEDURE — 80048 BASIC METABOLIC PNL TOTAL CA: CPT | Performed by: FAMILY MEDICINE

## 2022-01-18 PROCEDURE — 87635 SARS-COV-2 COVID-19 AMP PRB: CPT | Performed by: FAMILY MEDICINE

## 2022-01-18 PROCEDURE — 99214 OFFICE O/P EST MOD 30 MIN: CPT | Performed by: FAMILY MEDICINE

## 2022-01-18 PROCEDURE — 93000 ELECTROCARDIOGRAM COMPLETE: CPT | Performed by: FAMILY MEDICINE

## 2022-01-18 PROCEDURE — G2023 SPECIMEN COLLECT COVID-19: HCPCS | Performed by: FAMILY MEDICINE

## 2022-01-18 PROCEDURE — 84443 ASSAY THYROID STIM HORMONE: CPT | Performed by: FAMILY MEDICINE

## 2022-01-18 PROCEDURE — 36415 COLL VENOUS BLD VENIPUNCTURE: CPT | Performed by: FAMILY MEDICINE

## 2022-01-18 PROCEDURE — 85025 COMPLETE CBC W/AUTO DIFF WBC: CPT | Performed by: FAMILY MEDICINE

## 2022-01-18 RX ORDER — CLONAZEPAM 1 MG/1
TABLET ORAL
Qty: 10 TABLET | Refills: 0 | Status: CANCELLED | OUTPATIENT
Start: 2022-01-18

## 2022-01-18 ASSESSMENT — ANXIETY QUESTIONNAIRES
5. BEING SO RESTLESS THAT IT IS HARD TO SIT STILL: NOT AT ALL
IF YOU CHECKED OFF ANY PROBLEMS ON THIS QUESTIONNAIRE, HOW DIFFICULT HAVE THESE PROBLEMS MADE IT FOR YOU TO DO YOUR WORK, TAKE CARE OF THINGS AT HOME, OR GET ALONG WITH OTHER PEOPLE: SOMEWHAT DIFFICULT
3. WORRYING TOO MUCH ABOUT DIFFERENT THINGS: SEVERAL DAYS
GAD7 TOTAL SCORE: 5
1. FEELING NERVOUS, ANXIOUS, OR ON EDGE: SEVERAL DAYS
2. NOT BEING ABLE TO STOP OR CONTROL WORRYING: SEVERAL DAYS
7. FEELING AFRAID AS IF SOMETHING AWFUL MIGHT HAPPEN: SEVERAL DAYS
6. BECOMING EASILY ANNOYED OR IRRITABLE: NOT AT ALL

## 2022-01-18 ASSESSMENT — PATIENT HEALTH QUESTIONNAIRE - PHQ9
SUM OF ALL RESPONSES TO PHQ QUESTIONS 1-9: 12
5. POOR APPETITE OR OVEREATING: SEVERAL DAYS

## 2022-01-18 ASSESSMENT — MIFFLIN-ST. JEOR: SCORE: 2734.4

## 2022-01-18 NOTE — PROGRESS NOTES
Assessment & Plan   Problem List Items Addressed This Visit        Other    Anxiety - Primary    Relevant Orders    Adult Cardiology Eval Referral      Other Visit Diagnoses     Palpitations        Relevant Orders    VENOUS COLLECTION (Completed)    TSH WITH FREE T4 REFLEX ([facility])    HEMOGRAM PLATELET DIFF ([facility]) (Completed)    Basic Metabolic Panel ([facility])    EKG 12-lead complete w/read - Clinics (Completed)    Leadless EKG Monitor 3 to 7 Days    Adult Cardiology Eval Referral    Panic attack        Runny nose        Relevant Orders    COVID-19 ([facility]) (Completed)         Results for orders placed or performed in visit on 01/18/22   COVID-19 ([facility])     Status: None   Result Value Ref Range    COVID-19 Negative    HEMOGRAM PLATELET DIFF ([facility])     Status: Abnormal   Result Value Ref Range    WBC 7.0 10*9/L    RBC Count 4.62 3.8 - 5.2 10*12/L    Hemoglobin 15.4 11.7 - 15.7 g/dL    Hematocrit 46.1 35.0 - 47.0 %    MCV 99.7 78 - 100 fL    MCH 33.3 (A) 26 - 33 pg    MCHC 33.4 31 - 36 g/dL    Platelet Count 313 150 - 375 10^9/L    % Granulocytes 70.3 %    % Lymphocytes 21.5 %    % Monocytes 8.2 %       1. Anxiety  She will followup with Bhavana regarding her anxiety and medication changes or refills.  - Adult Cardiology Eval Referral; Future    2. Palpitations  Check labs. EKG done. I will also do a zio patch, she is very anxious about her heart and cardiac risk. I will refer her also to see cardiology.  - VENOUS COLLECTION  - TSH WITH FREE T4 REFLEX ([facility])  - HEMOGRAM PLATELET DIFF ([facility])  - Basic Metabolic Panel ([facility])  - EKG 12-lead complete w/read - Clinics  - Leadless EKG Monitor 3 to 7 Days; Future  - Adult Cardiology Eval Referral; Future    3. Panic attack      4. Runny nose  Negative covid test result.    - COVID-19 ([facility])    If you have been tested for COVID-19 or have concerns about it:    Stay home and away from others:    If possible, stay away from others, especially people who are at  higher risk for getting very  "sick from COVID-19, such as older  adults and people with other medical conditions.    If you have been in contact with someone with COVID-19, stay home   and away from others for 14 days after your last contact with that person.   Follow the recommendations of your local public health  department if you need to quarantine.    If you have a fever, cough or other symptoms of COVID-19, stay home   and away from others (except to get medical care).    Monitor your health:    Watch for fever, cough, shortness of breath, or other  symptoms of COVID-19.    Remember, symptoms may appear 2-14 days after exposure  to COVID-19 and can include:      Fever or chills    Cough    Shortness of breath or  difficulty breathing    Tiredness    Muscle or body aches    Headache    New loss of taste or  smell    Sore throat    Congestion or runny  nose    Nausea or vomiting    Diarrhea    Call your clinic if your symptoms change or worsen.  You can also reference up to date information on the following websites regarding COVID-19:    https://www.cdc.gov/coronavirus/2019-ncov/index.html  https://[place].gov/covid19/       BMI:   Estimated body mass index is 65.72 kg/m  as calculated from the following:    Height as of this encounter: 1.746 m (5' 8.75\").    Weight as of this encounter: 200.4 kg (441 lb 12.8 oz).         FUTURE APPOINTMENTS:       - Follow-up visit as needed, per results and     No follow-ups on file.    Rhea Hawkins MD  [facility]    Subjective     Nursing Notes:   Esperanza Jama CMA  2022  9:58 AM  Addendum  Chief Complaint   Patient presents with     Palpitations     palpitations and rapid heart rate, feels like her heart is skipping beats, occured once friday and once yesterday after eating, feels she is having panick attacks, father  of heart attack, becomes very debilitating, would like EKG, asked for refill of clonazepam     Pre-visit Screening:  Immunizations:  up to date  Colonoscopy:  " "NA  Mammogram: NA  Asthma Action Test/Plan:  NA  PHQ9:  Dne today  GAD7:  DOne today  Questioned patient about current smoking habits Pt. has never smoked.  Ok to leave detailed message on voice mail for today's visit only Yes, phone # [phone]           Coretta Huitron is a 41 year old female who presents to clinic today for the following health issues  HPI     Here with palpitations. Had some on Friday. Then again yesterday.  Dad  of MI. Is concerned about this. Took a klonopin last night, this helped.   This morning also had some palpitations again this morning. Feels a panic sensation. Has the heart stuff then feels panicked.  Has had depression and anxiety before. Takes klonopin for breakthrough anxiety. Has taken 5 pills over the past couple of years, has 5 left.  Had palpitations in the past and went to the ER, this was due to taking cold meds, hasnt taken any of this in the past.  Has had allergies: drippy nose over the past 1-2 weeks. Has seasonal allergies.        Review of Systems   Constitutional, HEENT, cardiovascular, pulmonary, gi and gu systems are negative, except as otherwise noted.      Objective    BP (!) 142/84 (BP Location: Left arm, Patient Position: Sitting, Cuff Size: Adult Large)   Pulse 90   Temp 98.2  F (36.8  C) (Temporal)   Ht 1.746 m (5' 8.75\")   Wt (!) 200.4 kg (441 lb 12.8 oz)   SpO2 98%   BMI 65.72 kg/m    Body mass index is 65.72 kg/m .  Physical Exam   GENERAL: healthy, alert and no distress  RESP: lungs clear to auscultation - no rales, rhonchi or wheezes  CV: regular rate and rhythm, normal S1 S2, no S3 or S4, no murmur, click or rub, no peripheral edema and peripheral pulses strong  MS: no gross musculoskeletal defects noted, no edema  NEURO: Normal strength and tone, mentation intact and speech normal  PSYCH: mentation appears normal, affect normal/bright    Results for orders placed or performed in visit on 22   COVID-19 ([facility])     Status: None   Result " Value Ref Range    COVID-19 Negative    HEMOGRAM PLATELET DIFF ([facility])     Status: Abnormal   Result Value Ref Range    WBC 7.0 10*9/L    RBC Count 4.62 3.8 - 5.2 10*12/L    Hemoglobin 15.4 11.7 - 15.7 g/dL    Hematocrit 46.1 35.0 - 47.0 %    MCV 99.7 78 - 100 fL    MCH 33.3 (A) 26 - 33 pg    MCHC 33.4 31 - 36 g/dL    Platelet Count 313 150 - 375 10^9/L    % Granulocytes 70.3 %    % Lymphocytes 21.5 %    % Monocytes 8.2 %       EKG--personally read by me, also discussed with cardiology on call. Not concerning.

## 2022-01-18 NOTE — NURSING NOTE
Chief Complaint   Patient presents with     Palpitations     palpitations and rapid heart rate, feels like her heart is skipping beats, occured once friday and once yesterday after eating, feels she is having panick attacks, father  of heart attack, becomes very debilitating, would like EKG, asked for refill of clonazepam     Pre-visit Screening:  Immunizations:  up to date  Colonoscopy:  NA  Mammogram: NA  Asthma Action Test/Plan:  NA  PHQ9:  Dne today  GAD7:  DOne today  Questioned patient about current smoking habits Pt. has never smoked.  Ok to leave detailed message on voice mail for today's visit only Yes, phone # [phone]

## 2022-01-19 ASSESSMENT — ANXIETY QUESTIONNAIRES: GAD7 TOTAL SCORE: 5

## 2022-01-20 ENCOUNTER — OFFICE VISIT (OUTPATIENT)
Dept: FAMILY MEDICINE | Facility: CLINIC | Age: 42
End: 2022-01-20

## 2022-01-20 ENCOUNTER — TELEPHONE (OUTPATIENT)
Dept: FAMILY MEDICINE | Facility: CLINIC | Age: 42
End: 2022-01-20

## 2022-01-20 ENCOUNTER — HOSPITAL ENCOUNTER (OUTPATIENT)
Dept: CARDIOLOGY | Facility: CLINIC | Age: 42
Discharge: HOME | End: 2022-01-20
Attending: FAMILY MEDICINE | Admitting: FAMILY MEDICINE
Payer: COMMERCIAL

## 2022-01-20 VITALS
SYSTOLIC BLOOD PRESSURE: 140 MMHG | OXYGEN SATURATION: 97 % | HEART RATE: 83 BPM | DIASTOLIC BLOOD PRESSURE: 80 MMHG | TEMPERATURE: 98.2 F

## 2022-01-20 DIAGNOSIS — R00.2 PALPITATIONS: Primary | ICD-10-CM

## 2022-01-20 DIAGNOSIS — E66.01 MORBID OBESITY, UNSPECIFIED OBESITY TYPE (H): ICD-10-CM

## 2022-01-20 DIAGNOSIS — R00.2 PALPITATIONS: ICD-10-CM

## 2022-01-20 DIAGNOSIS — F41.0 PANIC ATTACK: ICD-10-CM

## 2022-01-20 DIAGNOSIS — F41.1 GAD (GENERALIZED ANXIETY DISORDER): ICD-10-CM

## 2022-01-20 DIAGNOSIS — F33.1 MAJOR DEPRESSIVE DISORDER, RECURRENT EPISODE, MODERATE (H): ICD-10-CM

## 2022-01-20 LAB
T4, FREE, NON-DIALYSIS - QUEST: 1.5 NG/DL (ref 0.8–1.8)
TSH SERPL-ACNC: 5.05 MIU/L

## 2022-01-20 PROCEDURE — 93244 EXT ECG>48HR<7D REV&INTERPJ: CPT | Performed by: INTERNAL MEDICINE

## 2022-01-20 PROCEDURE — 93242 EXT ECG>48HR<7D RECORDING: CPT

## 2022-01-20 PROCEDURE — 99214 OFFICE O/P EST MOD 30 MIN: CPT | Performed by: PHYSICIAN ASSISTANT

## 2022-01-20 RX ORDER — HYDROXYZINE PAMOATE 25 MG/1
CAPSULE ORAL
Qty: 60 CAPSULE | Refills: 0 | Status: SHIPPED | OUTPATIENT
Start: 2022-01-20 | End: 2022-09-23

## 2022-01-20 RX ORDER — VENLAFAXINE HYDROCHLORIDE 75 MG/1
75 CAPSULE, EXTENDED RELEASE ORAL DAILY
Qty: 30 CAPSULE | Refills: 0 | Status: SHIPPED | OUTPATIENT
Start: 2022-01-20 | End: 2022-09-23

## 2022-01-20 RX ORDER — CLONAZEPAM 1 MG/1
TABLET ORAL
Qty: 10 TABLET | Refills: 0 | Status: SHIPPED | OUTPATIENT
Start: 2022-01-20 | End: 2022-09-30

## 2022-01-20 ASSESSMENT — PATIENT HEALTH QUESTIONNAIRE - PHQ9
5. POOR APPETITE OR OVEREATING: MORE THAN HALF THE DAYS
SUM OF ALL RESPONSES TO PHQ QUESTIONS 1-9: 11

## 2022-01-20 ASSESSMENT — ANXIETY QUESTIONNAIRES
7. FEELING AFRAID AS IF SOMETHING AWFUL MIGHT HAPPEN: MORE THAN HALF THE DAYS
2. NOT BEING ABLE TO STOP OR CONTROL WORRYING: MORE THAN HALF THE DAYS
3. WORRYING TOO MUCH ABOUT DIFFERENT THINGS: MORE THAN HALF THE DAYS
6. BECOMING EASILY ANNOYED OR IRRITABLE: SEVERAL DAYS
1. FEELING NERVOUS, ANXIOUS, OR ON EDGE: MORE THAN HALF THE DAYS
GAD7 TOTAL SCORE: 12
IF YOU CHECKED OFF ANY PROBLEMS ON THIS QUESTIONNAIRE, HOW DIFFICULT HAVE THESE PROBLEMS MADE IT FOR YOU TO DO YOUR WORK, TAKE CARE OF THINGS AT HOME, OR GET ALONG WITH OTHER PEOPLE: VERY DIFFICULT
5. BEING SO RESTLESS THAT IT IS HARD TO SIT STILL: SEVERAL DAYS

## 2022-01-20 NOTE — PROGRESS NOTES
Assessment & Plan     Palpitations  Already has cards consult  Ordered echo as well      Panic attack  Vistaril if needing to work/drive  - hydrOXYzine (VISTARIL) 25 MG capsule  Dispense: 60 capsule; Refill: 0  - clonazePAM (KLONOPIN) 1 MG tablet  Dispense: 10 tablet; Refill: 0    DANIA (generalized anxiety disorder)  Major depressive disorder, recurrent episode, moderate (H)  Switch to Venlfaxine  Call to [facility] to see if she can be seen by psychiatry    Morbid obesity, unspecified obesity type (H)  - Comprehensive Weight Management    See me 2 weeks      BRYAN Olson  [facility]    Subjective     Nursing Notes:   Mary Raymundo CMA  1/20/2022  8:58 AM  Signed  Chief Complaint   Patient presents with     Recheck Medication     BP     Anxiety     daily     Ok to lm on vm  Never smoked             Coretta Huitron is a 41 year old female who presents to clinic today for the following health issues:     HPI       Was seen by Dr. Hawikns 1/18 for palpitations. BP was high  Referred to Cardiologist and ZioPatch ordered -  Wearing this now.   Labs were normal aside from TSH mildly high but T4 normal range.     Having Panic attacks with palpitations due to fathers cardiac death history    Anxiety has been really not controlled lately    Weight has gone up last 6-7 months    Therapist: Marianne Denise   Every other week.       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  Physical Exam   GENERAL:  alert and no distress  RESP: lungs clear to auscultation - no rales, rhonchi or wheezes  CV: regular rate and rhythm, normal S1 S2, no S3 or S4, no murmur, click or rub, no peripheral edema and peripheral pulses strong      Mental Status Exam:   Appearance: anxious and overwhelmed  Grooming: adequately groomed  Demeanor: engaged, cooperative  Affect: normal  Speech: Normal.  Gait:Normal.  Movements: Normal  Form of thought: Logical, Linear and Goal directed  Thought content:  Normal  Insight:Good   Judgment: Good   Cognition: Good

## 2022-01-20 NOTE — NURSING NOTE
Chief Complaint   Patient presents with     Recheck Medication     BP     Anxiety     daily     Ok to lm on vm  Never smoked

## 2022-01-21 ASSESSMENT — ANXIETY QUESTIONNAIRES: GAD7 TOTAL SCORE: 12

## 2022-01-21 NOTE — TELEPHONE ENCOUNTER
Call to [facility] therapist - pt needs psychiatrist      Marianne [phone]   She will work on getting Doreen Scheduled with one of their medical providers for MH management    Bhavana Joseph PA-C  1/21/2022

## 2022-01-27 ENCOUNTER — OFFICE VISIT (OUTPATIENT)
Dept: FAMILY MEDICINE | Facility: CLINIC | Age: 42
End: 2022-01-27

## 2022-01-27 VITALS
HEART RATE: 75 BPM | TEMPERATURE: 98.1 F | WEIGHT: 293 LBS | DIASTOLIC BLOOD PRESSURE: 76 MMHG | SYSTOLIC BLOOD PRESSURE: 140 MMHG | BODY MASS INDEX: 65.42 KG/M2 | OXYGEN SATURATION: 98 %

## 2022-01-27 DIAGNOSIS — R79.89 ELEVATED TSH: ICD-10-CM

## 2022-01-27 DIAGNOSIS — E03.8 SUBCLINICAL HYPOTHYROIDISM: Primary | ICD-10-CM

## 2022-01-27 DIAGNOSIS — E66.01 MORBID OBESITY, UNSPECIFIED OBESITY TYPE (H): ICD-10-CM

## 2022-01-27 DIAGNOSIS — R00.2 PALPITATIONS: ICD-10-CM

## 2022-01-27 DIAGNOSIS — I10 BENIGN ESSENTIAL HYPERTENSION: ICD-10-CM

## 2022-01-27 LAB
ALT 1742-6: 61 U/L (ref 0–32)
CHOLEST SERPL-MCNC: 150 MG/DL (ref 0–199)
CHOLEST/HDLC SERPL: 2 {RATIO} (ref 0–5)
GLUCOSE SERPL-MCNC: 92 MG/DL (ref 60–99)
HDLC SERPL-MCNC: 62 MG/DL (ref 40–150)
LDLC SERPL CALC-MCNC: 68 MG/DL (ref 0–130)
TRIGL SERPL-MCNC: 99 MG/DL (ref 0–149)

## 2022-01-27 PROCEDURE — 82947 ASSAY GLUCOSE BLOOD QUANT: CPT | Performed by: PHYSICIAN ASSISTANT

## 2022-01-27 PROCEDURE — 80061 LIPID PANEL: CPT | Performed by: PHYSICIAN ASSISTANT

## 2022-01-27 PROCEDURE — 84460 ALANINE AMINO (ALT) (SGPT): CPT | Performed by: PHYSICIAN ASSISTANT

## 2022-01-27 PROCEDURE — 99213 OFFICE O/P EST LOW 20 MIN: CPT | Performed by: PHYSICIAN ASSISTANT

## 2022-01-27 PROCEDURE — 36415 COLL VENOUS BLD VENIPUNCTURE: CPT | Performed by: PHYSICIAN ASSISTANT

## 2022-01-27 RX ORDER — LOSARTAN POTASSIUM 25 MG/1
25 TABLET ORAL DAILY
Qty: 90 TABLET | Refills: 0 | Status: SHIPPED | OUTPATIENT
Start: 2022-01-27 | End: 2022-03-11 | Stop reason: DRUGHIGH

## 2022-01-27 NOTE — PROGRESS NOTES
Assessment & Plan     Subclinical hypothyroidism    - Thyroid Peroxidase and Thyroglob Atb ([facility])  - VENOUS COLLECTION    Benign essential hypertension  Start losartan 25 mg daily  - VENOUS COLLECTION    Palpitations  Zio done - will mail in today  - VENOUS COLLECTION    Morbid obesity, unspecified obesity type (H)    - Glucose Fasting ([facility])  - Lipid Panel ([facility])  - ALT ([facility])  - VENOUS COLLECTION    Elevated TSH    - Thyroid Peroxidase and Thyroglob Atb ([facility])  - VENOUS COLLECTION      Follow-up in clinic 4 weeks    BRYAN Olson  [facility]    Subjective     Nursing Notes:   Mary Raymundo CMA  1/27/2022 10:23 AM  Signed  Chief Complaint   Patient presents with     Recheck Medication     states better, bp check    Pt brought BP cuff with  149/83    Pre-Visit Screening:  Immunizations:NA  Colonoscopy:NA  Mammogram:Na  Asthma Action Test/Plan:Na  PHQ9:na  GAD7:NA  Questioned patient about current smoking habits Pt.never  OK to leave a detailed message on voice mail for today's visit yes, phone # [phone]               Coretta Huitron is a 41 year old female who presents to clinic today for the following health issues:     HPI       Follow-up on BP, palpitations and anxiety    Uncle had rhabdo with statin and PGM didn't tolerate statins    Cough on lisinopril     Review of chart shows  Prinzide 5/2008- 10/2012  Dizziness - switched to just lisinopril  10/2014 - switch to losartan - no documentation why    ZioPatch done this am  Hasn't scheduled echo            Objective    BP (!) 140/76 (BP Location: Left arm, Patient Position: Sitting, Cuff Size: Adult Large)   Pulse 75   Temp 98.1  F (36.7  C) (Oral)   Wt (!) 199.5 kg (439 lb 12.8 oz)   SpO2 98%   BMI 65.42 kg/m    Body mass index is 65.42 kg/m .  Physical Exam   GENERAL: healthy, alert and no distress  RESP: lungs clear to auscultation - no rales, rhonchi or wheezes  CV: regular rate and rhythm, normal S1 S2, no S3  or S4, no murmur, click or rub, no peripheral edema and peripheral pulses strong      Mental Status Exam:   Appearance: calm  Grooming: adequately groomed  Demeanor: engaged, cooperative  Affect: normal  Speech: Normal.  Gait:Normal.  Movements: Normal  Form of thought: Logical, Linear and Goal directed  Thought content:  Normal  Insight:Good   Judgment: Good   Cognition: Good

## 2022-01-27 NOTE — NURSING NOTE
Chief Complaint   Patient presents with     Recheck Medication     states better, bp check    Pt brought BP cuff with  149/83    Pre-Visit Screening:  Immunizations:NA  Colonoscopy:NA  Mammogram:Na  Asthma Action Test/Plan:Na  PHQ9:na  GAD7:NA  Questioned patient about current smoking habits Pt.never  OK to leave a detailed message on voice mail for today's visit yes, phone # [phone]

## 2022-01-28 LAB
THYROGLOBULIN ANTIBODY: <1 IU/ML (ref 0–40)
THYROID PEROXIDASE AB: 1 IU/ML

## 2022-02-02 ENCOUNTER — TELEPHONE (OUTPATIENT)
Dept: FAMILY MEDICINE | Facility: CLINIC | Age: 42
End: 2022-02-02

## 2022-02-02 ENCOUNTER — HOSPITAL ENCOUNTER (OUTPATIENT)
Dept: CARDIOLOGY | Facility: CLINIC | Age: 42
Discharge: HOME | End: 2022-02-02
Attending: PHYSICIAN ASSISTANT | Admitting: PHYSICIAN ASSISTANT
Payer: COMMERCIAL

## 2022-02-02 DIAGNOSIS — R00.2 PALPITATIONS: ICD-10-CM

## 2022-02-02 LAB
BI-PLANE LVEF ECHO: NORMAL
LVEF ECHO: NORMAL

## 2022-02-02 PROCEDURE — 93306 TTE W/DOPPLER COMPLETE: CPT

## 2022-02-02 PROCEDURE — 93306 TTE W/DOPPLER COMPLETE: CPT | Mod: 26 | Performed by: INTERNAL MEDICINE

## 2022-02-02 NOTE — TELEPHONE ENCOUNTER
Called pt to review Echo    Reviewed with pt  She will go over it with cardiologist.    Bhavana Joseph PA-C  2/2/2022

## 2022-02-17 PROBLEM — T78.40XD ALLERGY, SUBSEQUENT ENCOUNTER: Status: ACTIVE | Noted: 2017-07-12

## 2022-03-09 NOTE — PROGRESS NOTES
CARDIOLOGY CONSULT    REASON FOR CONSULT: palpitations    PRIMARY CARE PHYSICIAN:  Bhavana Joseph    HISTORY OF PRESENT ILLNESS:  41-year-old female seen for palpitations.  She has sleep apnea, irritable bowel, polycystic ovarian syndrome.    At baseline she does some light activity.  She has struggled with her weight over the years.  She admits to having a lot of anxiety and works with a counselor.  January 2021 was the anniversary of her father's death, he had congestive heart failure.  She became very anxious and started having palpitations.  She described a skipping sensation that would last a few seconds.  This has improved over the past few months.  Heart rate has been in the 60s and 70s at home.  Recently she was started on losartan, blood pressure runs in the 130s or less.    7 day Zio monitor January 2022 showed sinus rhythm, less than 1% ectopy, 4 SVT runs of 7 beats, symptoms correlated with ectopy.    Echo February 2022 showed EF 50 to 55%, mild RV hypokinesis, aorta 3.9 cm, no valve disease.    PAST MEDICAL HISTORY:  Past Medical History:   Diagnosis Date     Allergic state, subsequent encounter 7/12/2017     Depression      Hypertension      Morbid obesity, unspecified obesity type (H) 7/12/2017     Right foot pain      Sleep apnea     uses CPAP     Tibia/fibula fracture 4/23/2014       MEDICATIONS:  Current Outpatient Medications   Medication     Ascorbic Acid (VITAMIN C) 500 MG CAPS     buPROPion (WELLBUTRIN XL) 300 MG 24 hr tablet     cholecalciferol (VITAMIN D3) 5000 UNITS CAPS capsule     clonazePAM (KLONOPIN) 1 MG tablet     cyclobenzaprine (FLEXERIL) 10 MG tablet     fexofenadine (ALLEGRA) 180 MG tablet     hydrOXYzine (VISTARIL) 25 MG capsule     ibuprofen (ADVIL,MOTRIN) 600 MG tablet     losartan (COZAAR) 25 MG tablet     venlafaxine (EFFEXOR-XR) 75 MG 24 hr capsule     No current facility-administered medications for this visit.       ALLERGIES:  Allergies   Allergen Reactions      "No Known Drug Allergies        SOCIAL HISTORY:  I have reviewed this patient's social history and updated it with pertinent information if needed. Coretta Huitron  reports that she has never smoked. She has never used smokeless tobacco. She reports current alcohol use. She reports that she does not use drugs.    FAMILY HISTORY:  I have reviewed this patient's family history and updated it with pertinent information if needed.   Family History   Problem Relation Age of Onset     Cancer Maternal Grandfather      Cancer Paternal Grandfather      Colon Cancer Paternal Grandfather      Thyroid Disease Mother      Hypertension Father      Coronary Artery Disease Father 65        MI -  in 2015     Sleep Apnea Father      Thyroid Disease Maternal Grandmother        REVIEW OF SYSTEMS:  Constitutional:  No weight loss, fever, chills  HEENT:  Eyes:  No visual loss, blurred vision, double vision or yellow sclerae. No hearing loss, sneezing, congestion, runny nose or sore throat.  Skin:  No rash or itching.  Cardiovascular: per HPI  Respiratory: per HPI  GI:  No anorexia, nausea, vomiting or diarrhea. No abdominal pain or blood.  :  No dysurea, hematuria  Neurologic:  No headache, paralysis, ataxia, numbness or tingling in the extremities. No change in bowel or bladder control.  Musculoskeletal:  No muscle pain  Hematologic:  No bleeding or bruising.  Lymphatics:  No enlarged nodes. No history of splenectomy.  Endocrine:  No reports of sweating, cold or heat intolerance. No polyuria or polydipsia.  Allergies:  No history of asthma, hives, eczema or rhinitis.    PHYSICAL EXAM:  BP (!) 157/78 (BP Location: Right arm, Patient Position: Sitting, Cuff Size: Adult Large)   Pulse 90   Ht 1.753 m (5' 9\")   Wt (!) 197 kg (434 lb 3.2 oz)   SpO2 97%   BMI 64.12 kg/m      Constitutional: awake, alert, no distress  Eyes: PERRL, sclera nonicteric  ENT: trachea midline  Respiratory: Lungs clear   Cardiovascular: regular rate and " rhythm, no murmurs, No ectopy  GI: nondistended, nontender, bowel sounds present  Lymph/Hematologic: no lymphadenopathy  Skin: dry, no rash  Musculoskeletal: good muscle tone, strength 5/5 in upper and lower extremities  Neurologic: no focal deficits  Neuropsychiatric: appropriate affact    DATA:  Labs:   Recent Labs   Lab Test 01/27/22  0000 12/11/18  1249   CHOL 150 172   HDL 62 58   LDL 68 89   TRIG 99 146   CHOLHDLRATIO 2 3.0   January 2022: Potassium 4.3, creatinine 0.9, TSH 5.0    EKG, January 18, 2022: Sinus rhythm, rate 80, incomplete right bundle branch block    ASSESSMENT:  41-year-old female seen for palpitations.  She had some occasional ectopy on her monitor, but no other arrhythmias.  Suspect that most of her symptoms are anxiety related.  She had some mild RV dysfunction and mild aortic dilation on her echo.  We talked about how these can be related to sleep apnea, hypertension, and her weight.  Would not recommend any additional therapy at this time.  Her losartan will be increased.    RECOMMENDATIONS:  1.  Palpitations, suspect mainly noncardiac  -No further evaluation    2.  Mild aortic dilation  -Repeat echo in about 2 years    3.  Hypertension  -Increase losartan to 50 mg daily    Follow-up in 1 year with DARION.    Teja Sampson MD  Cardiology - [facility]  Pager:  [phone]  Text Page  March 11, 2022

## 2022-03-11 ENCOUNTER — OFFICE VISIT (OUTPATIENT)
Dept: CARDIOLOGY | Facility: CLINIC | Age: 42
End: 2022-03-11
Attending: FAMILY MEDICINE
Payer: COMMERCIAL

## 2022-03-11 VITALS
OXYGEN SATURATION: 97 % | WEIGHT: 293 LBS | SYSTOLIC BLOOD PRESSURE: 157 MMHG | BODY MASS INDEX: 43.4 KG/M2 | DIASTOLIC BLOOD PRESSURE: 78 MMHG | HEIGHT: 69 IN | HEART RATE: 90 BPM

## 2022-03-11 DIAGNOSIS — R00.2 PALPITATIONS: ICD-10-CM

## 2022-03-11 DIAGNOSIS — F41.9 ANXIETY: ICD-10-CM

## 2022-03-11 DIAGNOSIS — I10 BENIGN ESSENTIAL HYPERTENSION: Primary | ICD-10-CM

## 2022-03-11 PROCEDURE — 99204 OFFICE O/P NEW MOD 45 MIN: CPT | Performed by: INTERNAL MEDICINE

## 2022-03-11 RX ORDER — ESCITALOPRAM OXALATE 20 MG/1
20 TABLET ORAL DAILY
COMMUNITY
End: 2022-09-29

## 2022-03-11 RX ORDER — LOSARTAN POTASSIUM 50 MG/1
50 TABLET ORAL DAILY
Qty: 90 TABLET | Refills: 3 | Status: SHIPPED | OUTPATIENT
Start: 2022-03-11 | End: 2022-12-05

## 2022-03-11 NOTE — LETTER
3/11/2022    Bhavana Joseph, PA  [address], [address]  [facility] [address]    RE: Coretta Huitron       Dear Colleague,     I had the pleasure of seeing Coretta Huitron in the [facility].  CARDIOLOGY CONSULT    REASON FOR CONSULT: palpitations    PRIMARY CARE PHYSICIAN:  Bhavana Joseph    HISTORY OF PRESENT ILLNESS:  41-year-old female seen for palpitations.  She has sleep apnea, irritable bowel, polycystic ovarian syndrome.    At baseline she does some light activity.  She has struggled with her weight over the years.  She admits to having a lot of anxiety and works with a counselor.  January 2021 was the anniversary of her father's death, he had congestive heart failure.  She became very anxious and started having palpitations.  She described a skipping sensation that would last a few seconds.  This has improved over the past few months.  Heart rate has been in the 60s and 70s at home.  Recently she was started on losartan, blood pressure runs in the 130s or less.    7 day Zio monitor January 2022 showed sinus rhythm, less than 1% ectopy, 4 SVT runs of 7 beats, symptoms correlated with ectopy.    Echo February 2022 showed EF 50 to 55%, mild RV hypokinesis, aorta 3.9 cm, no valve disease.    PAST MEDICAL HISTORY:  Past Medical History:   Diagnosis Date     Allergic state, subsequent encounter 7/12/2017     Depression      Hypertension      Morbid obesity, unspecified obesity type (H) 7/12/2017     Right foot pain      Sleep apnea     uses CPAP     Tibia/fibula fracture 4/23/2014       MEDICATIONS:  Current Outpatient Medications   Medication     Ascorbic Acid (VITAMIN C) 500 MG CAPS     buPROPion (WELLBUTRIN XL) 300 MG 24 hr tablet     cholecalciferol (VITAMIN D3) 5000 UNITS CAPS capsule     clonazePAM (KLONOPIN) 1 MG tablet     cyclobenzaprine (FLEXERIL) 10 MG tablet     fexofenadine (ALLEGRA) 180 MG tablet     hydrOXYzine (VISTARIL) 25 MG capsule     ibuprofen  (ADVIL,MOTRIN) 600 MG tablet     losartan (COZAAR) 25 MG tablet     venlafaxine (EFFEXOR-XR) 75 MG 24 hr capsule     No current facility-administered medications for this visit.       ALLERGIES:  Allergies   Allergen Reactions     No Known Drug Allergies        SOCIAL HISTORY:  I have reviewed this patient's social history and updated it with pertinent information if needed. Coretta Huitron  reports that she has never smoked. She has never used smokeless tobacco. She reports current alcohol use. She reports that she does not use drugs.    FAMILY HISTORY:  I have reviewed this patient's family history and updated it with pertinent information if needed.   Family History   Problem Relation Age of Onset     Cancer Maternal Grandfather      Cancer Paternal Grandfather      Colon Cancer Paternal Grandfather      Thyroid Disease Mother      Hypertension Father      Coronary Artery Disease Father 65        MI -  in      Sleep Apnea Father      Thyroid Disease Maternal Grandmother        REVIEW OF SYSTEMS:  Constitutional:  No weight loss, fever, chills  HEENT:  Eyes:  No visual loss, blurred vision, double vision or yellow sclerae. No hearing loss, sneezing, congestion, runny nose or sore throat.  Skin:  No rash or itching.  Cardiovascular: per HPI  Respiratory: per HPI  GI:  No anorexia, nausea, vomiting or diarrhea. No abdominal pain or blood.  :  No dysurea, hematuria  Neurologic:  No headache, paralysis, ataxia, numbness or tingling in the extremities. No change in bowel or bladder control.  Musculoskeletal:  No muscle pain  Hematologic:  No bleeding or bruising.  Lymphatics:  No enlarged nodes. No history of splenectomy.  Endocrine:  No reports of sweating, cold or heat intolerance. No polyuria or polydipsia.  Allergies:  No history of asthma, hives, eczema or rhinitis.    PHYSICAL EXAM:  BP (!) 157/78 (BP Location: Right arm, Patient Position: Sitting, Cuff Size: Adult Large)   Pulse 90   Ht 1.753 m (5'  "9\")   Wt (!) 197 kg (434 lb 3.2 oz)   SpO2 97%   BMI 64.12 kg/m      Constitutional: awake, alert, no distress  Eyes: PERRL, sclera nonicteric  ENT: trachea midline  Respiratory: Lungs clear   Cardiovascular: regular rate and rhythm, no murmurs, No ectopy  GI: nondistended, nontender, bowel sounds present  Lymph/Hematologic: no lymphadenopathy  Skin: dry, no rash  Musculoskeletal: good muscle tone, strength 5/5 in upper and lower extremities  Neurologic: no focal deficits  Neuropsychiatric: appropriate affact    DATA:  Labs:   Recent Labs   Lab Test 01/27/22  0000 12/11/18  1249   CHOL 150 172   HDL 62 58   LDL 68 89   TRIG 99 146   CHOLHDLRATIO 2 3.0   January 2022: Potassium 4.3, creatinine 0.9, TSH 5.0    EKG, January 18, 2022: Sinus rhythm, rate 80, incomplete right bundle branch block    ASSESSMENT:  41-year-old female seen for palpitations.  She had some occasional ectopy on her monitor, but no other arrhythmias.  Suspect that most of her symptoms are anxiety related.  She had some mild RV dysfunction and mild aortic dilation on her echo.  We talked about how these can be related to sleep apnea, hypertension, and her weight.  Would not recommend any additional therapy at this time.  Her losartan will be increased.    RECOMMENDATIONS:  1.  Palpitations, suspect mainly noncardiac  -No further evaluation    2.  Mild aortic dilation  -Repeat echo in about 2 years    3.  Hypertension  -Increase losartan to 50 mg daily    Follow-up in 1 year with DARION.    Teja Sampson MD  Cardiology - [facility]  Pager:  [phone]  Text Page  March 11, 2022    Thank you for allowing me to participate in the care of your patient.      Sincerely,     Teja Sampson MD   [facility]  cc:   Rhea Hawkins MD  [address] [place] [address]        "

## 2022-06-04 ENCOUNTER — HEALTH MAINTENANCE LETTER (OUTPATIENT)
Age: 42
End: 2022-06-04

## 2022-08-08 ENCOUNTER — HOSPITAL ENCOUNTER (OUTPATIENT)
Dept: MAMMOGRAPHY | Facility: CLINIC | Age: 42
Discharge: HOME | End: 2022-08-08
Attending: PHYSICIAN ASSISTANT | Admitting: PHYSICIAN ASSISTANT
Payer: COMMERCIAL

## 2022-08-08 DIAGNOSIS — Z12.31 VISIT FOR SCREENING MAMMOGRAM: ICD-10-CM

## 2022-08-08 PROCEDURE — 77067 SCR MAMMO BI INCL CAD: CPT

## 2022-08-19 ENCOUNTER — TRANSFERRED RECORDS (OUTPATIENT)
Dept: FAMILY MEDICINE | Facility: CLINIC | Age: 42
End: 2022-08-19

## 2022-09-18 DIAGNOSIS — F41.0 PANIC ATTACK: ICD-10-CM

## 2022-09-21 RX ORDER — HYDROXYZINE PAMOATE 25 MG/1
CAPSULE ORAL
Qty: 60 CAPSULE | Refills: 0 | COMMUNITY
Start: 2022-09-21

## 2022-09-21 NOTE — TELEPHONE ENCOUNTER
Coretta Huitron is requesting a refill of:    Refused Prescriptions:                       Disp   Refills    hydrOXYzine (VISTARIL) 25 MG capsule [Phar*60 cap*0        Sig: TAKE 1 TO 2 CAPSULES BY MOUTH UP TO THREE TIMES DAILY           FOR ANXIETY  Refused By: GUERITA LAWRENCE  Reason for Refusal: Originating/Specialty Provider to approve  Reason for Refusal Comment: Refills need to go to [facility]

## 2022-09-22 ENCOUNTER — MYC REFILL (OUTPATIENT)
Dept: FAMILY MEDICINE | Facility: CLINIC | Age: 42
End: 2022-09-22

## 2022-09-22 ENCOUNTER — MYC MEDICAL ADVICE (OUTPATIENT)
Dept: FAMILY MEDICINE | Facility: CLINIC | Age: 42
End: 2022-09-22

## 2022-09-22 DIAGNOSIS — F41.0 PANIC ATTACK: ICD-10-CM

## 2022-09-22 RX ORDER — CLONAZEPAM 1 MG/1
TABLET ORAL
Qty: 10 TABLET | Refills: 0 | Status: CANCELLED | OUTPATIENT
Start: 2022-09-22

## 2022-09-22 RX ORDER — HYDROXYZINE PAMOATE 25 MG/1
CAPSULE ORAL
Qty: 60 CAPSULE | Refills: 0 | Status: CANCELLED | OUTPATIENT
Start: 2022-09-22

## 2022-09-23 RX ORDER — HYDROXYZINE PAMOATE 25 MG/1
CAPSULE ORAL
Qty: 18 CAPSULE | Refills: 0 | Status: SHIPPED | OUTPATIENT
Start: 2022-09-23 | End: 2022-09-29

## 2022-09-23 NOTE — TELEPHONE ENCOUNTER
Coretta Huitron is requesting a refill of:    Pending Prescriptions:                       Disp   Refills    hydrOXYzine (VISTARIL) 25 MG capsule      6 caps*0            Sig: Take 1-2 tablets up to 3 x a day for anxiety

## 2022-09-29 ENCOUNTER — OFFICE VISIT (OUTPATIENT)
Dept: FAMILY MEDICINE | Facility: CLINIC | Age: 42
End: 2022-09-29

## 2022-09-29 VITALS
DIASTOLIC BLOOD PRESSURE: 90 MMHG | OXYGEN SATURATION: 97 % | HEART RATE: 75 BPM | SYSTOLIC BLOOD PRESSURE: 136 MMHG | TEMPERATURE: 97.6 F

## 2022-09-29 DIAGNOSIS — F33.1 MAJOR DEPRESSIVE DISORDER, RECURRENT EPISODE, MODERATE (H): Primary | ICD-10-CM

## 2022-09-29 DIAGNOSIS — E28.2 PCOS (POLYCYSTIC OVARIAN SYNDROME): ICD-10-CM

## 2022-09-29 DIAGNOSIS — E66.01 MORBID OBESITY, UNSPECIFIED OBESITY TYPE (H): ICD-10-CM

## 2022-09-29 DIAGNOSIS — F41.0 PANIC ATTACK: ICD-10-CM

## 2022-09-29 DIAGNOSIS — E55.9 VITAMIN D DEFICIENCY: ICD-10-CM

## 2022-09-29 DIAGNOSIS — F41.1 GAD (GENERALIZED ANXIETY DISORDER): ICD-10-CM

## 2022-09-29 DIAGNOSIS — I71.21 THORACIC ASCENDING AORTIC ANEURYSM (H): ICD-10-CM

## 2022-09-29 DIAGNOSIS — I51.7 ACQUIRED DILATION OF LEFT VENTRICLE OF HEART: ICD-10-CM

## 2022-09-29 DIAGNOSIS — G47.33 OBSTRUCTIVE SLEEP APNEA SYNDROME: ICD-10-CM

## 2022-09-29 DIAGNOSIS — I10 BENIGN ESSENTIAL HYPERTENSION: ICD-10-CM

## 2022-09-29 LAB
BUN SERPL-MCNC: 9 MG/DL (ref 7–25)
BUN/CREATININE RATIO: 10.3 (ref 6–22)
CALCIUM SERPL-MCNC: 9.5 MG/DL (ref 8.6–10.3)
CHLORIDE SERPLBLD-SCNC: 104.1 MMOL/L (ref 98–110)
CO2 SERPL-SCNC: 31.1 MMOL/L (ref 20–32)
CREAT SERPL-MCNC: 0.87 MG/DL (ref 0.6–1.3)
GLUCOSE SERPL-MCNC: 93 MG/DL (ref 60–99)
POTASSIUM SERPL-SCNC: 4.05 MMOL/L (ref 3.5–5.3)
SODIUM SERPL-SCNC: 141.7 MMOL/L (ref 135–146)

## 2022-09-29 PROCEDURE — 36415 COLL VENOUS BLD VENIPUNCTURE: CPT | Performed by: PHYSICIAN ASSISTANT

## 2022-09-29 PROCEDURE — 80048 BASIC METABOLIC PNL TOTAL CA: CPT | Performed by: PHYSICIAN ASSISTANT

## 2022-09-29 PROCEDURE — 99214 OFFICE O/P EST MOD 30 MIN: CPT | Performed by: PHYSICIAN ASSISTANT

## 2022-09-29 RX ORDER — MEDROXYPROGESTERONE ACETATE 10 MG
10 TABLET ORAL
COMMUNITY
Start: 2022-08-24 | End: 2024-01-22

## 2022-09-29 RX ORDER — HYDROXYZINE PAMOATE 25 MG/1
CAPSULE ORAL
Qty: 90 CAPSULE | Refills: 0 | Status: SHIPPED | OUTPATIENT
Start: 2022-09-29 | End: 2022-12-05

## 2022-09-29 RX ORDER — ESCITALOPRAM OXALATE 20 MG/1
20 TABLET ORAL DAILY
Qty: 90 TABLET | Refills: 1 | Status: SHIPPED | OUTPATIENT
Start: 2022-09-29 | End: 2022-12-05

## 2022-09-29 RX ORDER — BUPROPION HYDROCHLORIDE 300 MG/1
300 TABLET ORAL EVERY MORNING
Qty: 90 TABLET | Refills: 1 | Status: SHIPPED | OUTPATIENT
Start: 2022-09-29 | End: 2022-12-05

## 2022-09-29 ASSESSMENT — ANXIETY QUESTIONNAIRES
IF YOU CHECKED OFF ANY PROBLEMS ON THIS QUESTIONNAIRE, HOW DIFFICULT HAVE THESE PROBLEMS MADE IT FOR YOU TO DO YOUR WORK, TAKE CARE OF THINGS AT HOME, OR GET ALONG WITH OTHER PEOPLE: SOMEWHAT DIFFICULT
GAD7 TOTAL SCORE: 9
2. NOT BEING ABLE TO STOP OR CONTROL WORRYING: MORE THAN HALF THE DAYS
7. FEELING AFRAID AS IF SOMETHING AWFUL MIGHT HAPPEN: MORE THAN HALF THE DAYS
GAD7 TOTAL SCORE: 9
5. BEING SO RESTLESS THAT IT IS HARD TO SIT STILL: NOT AT ALL
1. FEELING NERVOUS, ANXIOUS, OR ON EDGE: MORE THAN HALF THE DAYS
3. WORRYING TOO MUCH ABOUT DIFFERENT THINGS: MORE THAN HALF THE DAYS
6. BECOMING EASILY ANNOYED OR IRRITABLE: SEVERAL DAYS

## 2022-09-29 ASSESSMENT — PATIENT HEALTH QUESTIONNAIRE - PHQ9
SUM OF ALL RESPONSES TO PHQ QUESTIONS 1-9: 7
5. POOR APPETITE OR OVEREATING: NOT AT ALL

## 2022-09-29 NOTE — NURSING NOTE
Chief Complaint   Patient presents with     Recheck Medication     Non fasting medication check and review      Pre-visit Screening:  Immunizations:  up to date  Colonoscopy:  NA  Mammogram: is up to date  Asthma Action Test/Plan:  NA  PHQ9:  Given today   GAD7:  Given today   Questioned patient about current smoking habits Pt. has never smoked.  Ok to leave detailed message on voice mail for today's visit only yes, phone # [phone]        Luis,    It was nice meeting you!  I'm sorry that you have had a few difficult months; let's work on getting you breathing better.     Recommendations as follows:   - change current advair to the puffer and you will start TWO puffs twice a day  - change spiriva to the puffer and continue two puffs once a day dosing   - start prednisone 5 mg a day for 5 days  - start bactrim 1 tab twice a day to treat the stenotrophomonas   - please schedule pulmonary rehab  - we will contact you to coordinate the overnight oximetry study to evaluate your need for oxygen at nighttime   - I have also ordered refills of the duoneb nebulizer  - I will give you an aerobika device to use at least 2-3 times per day (after albuterol inhaler or after duoneb nebulizer) for 10 mins each session to help loosen the mucous   - we will consider alternative COPD therapies for you such as daily azithromycin vs. roflumilast if we cannot get you under better control   - we will repeat the chest CT around February 2022   - Please call the pulmonary clinic with any questions. The pulmonary clinic number is: [phone]    Stay up to date with vaccinations including your yearly flu vaccine. Please continue to social distance which does not exclude going outside and enjoying our wonderful weather! Wash your hands regularly. Wear a mask when unable to social distance (such as in the grocery store).  I recommend that you receive the COVID-19 vaccine.     Have a nice summer and stay well! Please let me know if you have questions or concerns.     Yamilet Cuellar MD  Pulmonary, Allergy, Critical Care, and Sleep Medicine   [facility]

## 2022-09-29 NOTE — PROGRESS NOTES
Assessment & Plan     Major depressive disorder, recurrent episode, moderate (H)  Pt wants to continue current meds for now  - buPROPion (WELLBUTRIN XL) 300 MG 24 hr tablet  Dispense: 90 tablet; Refill: 1  - escitalopram (LEXAPRO) 20 MG tablet  Dispense: 90 tablet; Refill: 1    DANIA (generalized anxiety disorder)    - buPROPion (WELLBUTRIN XL) 300 MG 24 hr tablet  Dispense: 90 tablet; Refill: 1  - escitalopram (LEXAPRO) 20 MG tablet  Dispense: 90 tablet; Refill: 1    Morbid obesity, unspecified obesity type (H)      Obstructive sleep apnea syndrome      PCOS (polycystic ovarian syndrome) - per [facility]      Vitamin D deficiency      Panic attack    - clonazePAM (KLONOPIN) 1 MG tablet  Dispense: 10 tablet; Refill: 0  - hydrOXYzine (VISTARIL) 25 MG capsule  Dispense: 90 capsule; Refill: 0    Benign essential hypertension  Monitor at home  Diastolic high here  Hx of white coat HTN    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  Push BP lower if needed    Thoracic ascending aortic aneurysm  Repeat Echo in 2 years        FUTURE APPOINTMENTS:       - Follow-up visit in 6 months  Work on weight loss  Regular exercise      BRYAN Olson  [facility]    Subjective     Nursing Notes:   Frida Lopez CMA  2022  1:24 PM  Signed  Chief Complaint   Patient presents with     Recheck Medication     Non fasting medication check and review      Pre-visit Screening:  Immunizations:  up to date  Colonoscopy:  NA  Mammogram: is up to date  Asthma Action Test/Plan:  NA  PHQ9:  Given today   GAD7:  Given today   Questioned patient about current smoking habits Pt. has never smoked.  Ok to leave detailed message on voice mail for today's visit only yes, phone # [phone]                 Coretta Huitron is a 42 year old female who presents to clinic today for the following health issues     HPI     Grandmother   - MGM this spring  2 years ago since dad   New stressors:  Cat with heart murmur  and red urethra - may need bx   Cat is 14 years old  This is her first pet on her own    Seeing therapist every 3 weeks.    Saw cardiologist this spring -repeat echo 2 years            Current Medications  Current Outpatient Medications   Medication Sig Dispense Refill     buPROPion (WELLBUTRIN XL) 300 MG 24 hr tablet Take 1 tablet (300 mg) by mouth every morning 90 tablet 1     cholecalciferol (VITAMIN D3) 5000 UNITS CAPS capsule Take by mouth daily       cyclobenzaprine (FLEXERIL) 10 MG tablet Take one as needed for tension headaches 30 tablet 0     escitalopram (LEXAPRO) 20 MG tablet Take 1 tablet (20 mg) by mouth daily 90 tablet 1     fexofenadine (ALLEGRA) 180 MG tablet Take 180 mg by mouth daily       hydrOXYzine (VISTARIL) 25 MG capsule Take 1-2 tablets up to 3 x a day for anxiety 90 capsule 0     ibuprofen (ADVIL,MOTRIN) 600 MG tablet Take 1 tablet (600 mg) by mouth every 6 hours as needed for pain (mild) 30 tablet 0     losartan (COZAAR) 50 MG tablet Take 1 tablet (50 mg) by mouth daily 90 tablet 3     medroxyPROGESTERone (PROVERA) 10 MG tablet TAKE 1 TABLET BY MOUTH EVERY DAY FOR 5 DAYS       clonazePAM (KLONOPIN) 1 MG tablet Take one as needed for anxiety (Patient not taking: Reported on 9/29/2022) 10 tablet 0         Constitutional, HEENT, Cardiovascular, Pulmonary, GI and  systems are negative, except as otherwise noted.          Objective    BP (!) 136/90 (BP Location: Left arm, Patient Position: Sitting, Cuff Size: Adult Large)   Pulse 75   Temp 97.6  F (36.4  C) (Temporal)   LMP 09/28/2022 (Exact Date)   SpO2 97%   There is no height or weight on file to calculate BMI.  Physical Exam   GENERAL: healthy, alert and no distress  RESP: lungs clear to auscultation - no rales, rhonchi or wheezes  CV: regular rate and rhythm, normal S1 S2, no S3 or S4, no murmur, click or rub, no peripheral edema and peripheral pulses strong  MS: no gross musculoskeletal defects noted    Mental Status Exam:    Appearance: upset  Grooming: adequately groomed  Demeanor: engaged, cooperative  Affect: normal  Speech: Normal.  Gait:Normal.  Movements: Normal  Form of thought: Logical, Linear and Goal directed  Thought content:  Normal  Insight:Good   Judgment: Good   Cognition: Good

## 2022-09-30 PROBLEM — I71.21 THORACIC ASCENDING AORTIC ANEURYSM (H): Status: ACTIVE | Noted: 2022-09-30

## 2022-09-30 PROBLEM — F41.0 PANIC ATTACK: Status: ACTIVE | Noted: 2022-09-30

## 2022-09-30 PROBLEM — I51.7: Status: ACTIVE | Noted: 2022-09-30

## 2022-09-30 RX ORDER — CLONAZEPAM 1 MG/1
TABLET ORAL
Qty: 10 TABLET | Refills: 0 | Status: SHIPPED | OUTPATIENT
Start: 2022-09-30 | End: 2022-12-05

## 2022-10-09 ENCOUNTER — HEALTH MAINTENANCE LETTER (OUTPATIENT)
Age: 42
End: 2022-10-09

## 2022-10-17 ENCOUNTER — OFFICE VISIT (OUTPATIENT)
Dept: FAMILY MEDICINE | Facility: CLINIC | Age: 42
End: 2022-10-17

## 2022-10-17 VITALS
TEMPERATURE: 97.2 F | SYSTOLIC BLOOD PRESSURE: 138 MMHG | DIASTOLIC BLOOD PRESSURE: 90 MMHG | HEART RATE: 66 BPM | HEIGHT: 70 IN | OXYGEN SATURATION: 99 % | BODY MASS INDEX: 62.3 KG/M2

## 2022-10-17 DIAGNOSIS — L03.311 CELLULITIS OF ABDOMINAL WALL: Primary | ICD-10-CM

## 2022-10-17 DIAGNOSIS — L98.9 SKIN LESION: ICD-10-CM

## 2022-10-17 PROCEDURE — 99214 OFFICE O/P EST MOD 30 MIN: CPT | Performed by: FAMILY MEDICINE

## 2022-10-17 NOTE — NURSING NOTE
Chief Complaint   Patient presents with     Derm Problem     A red sore on her right groin area. It is deep and painful. Pt has had this in the past and prescribed Augmentin.          Pre-visit Screening:  Immunizations:  up to date  Colonoscopy:  NA  Mammogram: NA  Asthma Action Test/Plan:  NA  PHQ9:  NA  GAD7:  NA  Questioned patient about current smoking habits Pt. has never smoked.  Ok to leave detailed message on voice mail for today's visit only Yes, phone # [phone]        
MED/SURG

## 2022-10-17 NOTE — PROGRESS NOTES
"Assessment & Plan   Problem List Items Addressed This Visit    None  Visit Diagnoses     Cellulitis of abdominal wall    -  Primary    Relevant Medications    amoxicillin-clavulanate (AUGMENTIN) 875-125 MG tablet    Skin lesion        Relevant Medications    amoxicillin-clavulanate (AUGMENTIN) 875-125 MG tablet         1. Cellulitis of abdominal wall  Treat with antibiotics and warm packs/soaks.  - amoxicillin-clavulanate (AUGMENTIN) 875-125 MG tablet; Take 1 tablet by mouth 2 times daily for 10 days  Dispense: 20 tablet; Refill: 0    2. Skin lesion  She doesn't want this opened. There may be a pocket to open, but she wants to first try antibiotics. followup in 2 days for a recheck if not improving.  - amoxicillin-clavulanate (AUGMENTIN) 875-125 MG tablet; Take 1 tablet by mouth 2 times daily for 10 days  Dispense: 20 tablet; Refill: 0           BMI:   Estimated body mass index is 62.3 kg/m  as calculated from the following:    Height as of this encounter: 1.778 m (5' 10\").    Weight as of 3/11/22: 197 kg (434 lb 3.2 oz).         FUTURE APPOINTMENTS:       - Follow-up visit in 2 days.    No follow-ups on file.    Rhea Hawkins MD  [facility]    Subjective     Nursing Notes:   Radha Callahan  10/17/2022  3:33 PM  Signed  Chief Complaint   Patient presents with     Derm Problem     A red sore on her right groin area. It is deep and painful. Pt has had this in the past and prescribed Augmentin.          Pre-visit Screening:  Immunizations:  up to date  Colonoscopy:  NA  Mammogram: NA  Asthma Action Test/Plan:  NA  PHQ9:  NA  GAD7:  NA  Questioned patient about current smoking habits Pt. has never smoked.  Ok to leave detailed message on voice mail for today's visit only Yes, phone # [phone]             Coretta Huitron is a 42 year old female who presents to clinic today for the following health issues   HPI     Has a sore in the right right lower abd. Has had this in the past and takes an " "abx. Doing warm soaks in the tub. Doesn't want this drained.        Review of Systems   Constitutional, HEENT, cardiovascular, pulmonary, gi and gu systems are negative, except as otherwise noted.      Objective    BP (!) 138/90 (BP Location: Left arm, Patient Position: Sitting, Cuff Size: Adult Large)   Pulse 66   Temp 97.2  F (36.2  C) (Temporal)   Ht 1.778 m (5' 10\")   LMP 09/28/2022 (Exact Date)   SpO2 99%   BMI 62.30 kg/m    Body mass index is 62.3 kg/m .  Physical Exam   GENERAL: healthy, alert and no distress  MS: no gross musculoskeletal defects noted, no edema  NEURO: Normal strength and tone, mentation intact and speech normal  PSYCH: mentation appears normal, affect normal/bright  Abd: right lower abd with red area, increased warmth and tender, there is a central area of possible fluid.          "

## 2022-10-28 ENCOUNTER — OFFICE VISIT (OUTPATIENT)
Dept: FAMILY MEDICINE | Facility: CLINIC | Age: 42
End: 2022-10-28

## 2022-10-28 VITALS
HEART RATE: 75 BPM | DIASTOLIC BLOOD PRESSURE: 92 MMHG | SYSTOLIC BLOOD PRESSURE: 134 MMHG | OXYGEN SATURATION: 98 % | TEMPERATURE: 97.8 F

## 2022-10-28 DIAGNOSIS — L98.9 SKIN LESION: ICD-10-CM

## 2022-10-28 DIAGNOSIS — L03.311 CELLULITIS OF ABDOMINAL WALL: ICD-10-CM

## 2022-10-28 PROCEDURE — 99214 OFFICE O/P EST MOD 30 MIN: CPT | Performed by: FAMILY MEDICINE

## 2022-10-28 NOTE — NURSING NOTE
Chief Complaint   Patient presents with     Derm Problem     Recheck abscess near groin, is not irritated as much as las time, size has gone down, completed antibiotics yesterday      Pre-visit Screening:  Immunizations:  up to date  Colonoscopy:  NA  Mammogram: NA  Asthma Action Test/Plan:  NA  PHQ9:  NA  GAD7:  NA  Questioned patient about current smoking habits Pt. has never smoked.  Ok to leave detailed message on voice mail for today's visit only Yes, phone # [phone]

## 2022-10-28 NOTE — PROGRESS NOTES
"Assessment & Plan   Problem List Items Addressed This Visit    None  Visit Diagnoses     Cellulitis of abdominal wall        Relevant Medications    amoxicillin-clavulanate (AUGMENTIN) 875-125 MG tablet    Skin lesion        Relevant Medications    amoxicillin-clavulanate (AUGMENTIN) 875-125 MG tablet         1. Cellulitis of abdominal wall  This is improved but not resolved. I advised 5 more days of antibiotics, continue warm soaks, discussed risks of extended antibiotics.  - amoxicillin-clavulanate (AUGMENTIN) 875-125 MG tablet; Take 1 tablet by mouth 2 times daily  Dispense: 20 tablet; Refill: 0    2. Skin lesion    - amoxicillin-clavulanate (AUGMENTIN) 875-125 MG tablet; Take 1 tablet by mouth 2 times daily  Dispense: 20 tablet; Refill: 0           BMI:   Estimated body mass index is 62.3 kg/m  as calculated from the following:    Height as of 10/17/22: 1.778 m (5' 10\").    Weight as of 3/11/22: 197 kg (434 lb 3.2 oz).         FUTURE APPOINTMENTS:       - Follow-up visit as needed.    No follow-ups on file.    Rhea Hawkins MD  [facility]    Subjective     Nursing Notes:   Esperanza Jama CMA  10/28/2022  3:21 PM  Signed  Chief Complaint   Patient presents with     Derm Problem     Recheck abscess near groin, is not irritated as much as las time, size has gone down, completed antibiotics yesterday      Pre-visit Screening:  Immunizations:  up to date  Colonoscopy:  NA  Mammogram: NA  Asthma Action Test/Plan:  NA  PHQ9:  NA  GAD7:  NA  Questioned patient about current smoking habits Pt. has never smoked.  Ok to leave detailed message on voice mail for today's visit only Yes, phone # [phone]           Coretta Huitron is a 42 year old female who presents to clinic today for the following health issues     HPI     Here for followup on skin lesion right lower abd. This has improved but isn't gone. No longer tender. No fluid pocket. Has been on recent 10 d abx. She is concerned tht this " should be treated longer.        Review of Systems   Constitutional, HEENT, cardiovascular, pulmonary, gi and gu systems are negative, except as otherwise noted.      Objective    BP (!) 134/92 (BP Location: Right arm, Patient Position: Sitting, Cuff Size: Adult Large)   Pulse 75   Temp 97.8  F (36.6  C) (Temporal)   LMP 09/28/2022 (Exact Date)   SpO2 98%   There is no height or weight on file to calculate BMI.  Physical Exam   GENERAL: healthy, alert and no distress  MS: no gross musculoskeletal defects noted, no edema  NEURO: Normal strength and tone, mentation intact and speech normal  PSYCH: mentation appears normal, affect normal/bright  Right lower abd non tender, cellulitis area has improved. No fluid pocket

## 2022-11-17 DIAGNOSIS — F33.1 MAJOR DEPRESSIVE DISORDER, RECURRENT EPISODE, MODERATE (H): ICD-10-CM

## 2022-11-17 DIAGNOSIS — F41.1 GAD (GENERALIZED ANXIETY DISORDER): ICD-10-CM

## 2022-11-17 RX ORDER — BUPROPION HYDROCHLORIDE 300 MG/1
TABLET ORAL
Qty: 90 TABLET | Refills: 1 | COMMUNITY
Start: 2022-11-17

## 2022-11-17 NOTE — TELEPHONE ENCOUNTER
Coretta Huitron is requesting a refill of:    Refused Prescriptions:                       Disp   Refills    buPROPion (WELLBUTRIN XL) 300 MG 24 hr tab*90 tab*1        Sig: TAKE 1 TABLET(300 MG) BY MOUTH EVERY MORNING  Refused By: GUERITA LAWRENCE  Reason for Refusal: Should already have refills on file  Reason for Refusal Comment: Refills sent on 09/29/22 for 90 with 1 refill

## 2022-11-28 ENCOUNTER — OFFICE VISIT (OUTPATIENT)
Dept: FAMILY MEDICINE | Facility: CLINIC | Age: 42
End: 2022-11-28

## 2022-11-28 VITALS
SYSTOLIC BLOOD PRESSURE: 126 MMHG | DIASTOLIC BLOOD PRESSURE: 84 MMHG | TEMPERATURE: 98.1 F | OXYGEN SATURATION: 98 % | RESPIRATION RATE: 24 BRPM | HEART RATE: 73 BPM

## 2022-11-28 DIAGNOSIS — L02.211 ABSCESS OF SKIN OF ABDOMEN: Primary | ICD-10-CM

## 2022-11-28 DIAGNOSIS — E66.01 MORBID OBESITY, UNSPECIFIED OBESITY TYPE (H): ICD-10-CM

## 2022-11-28 PROCEDURE — 99213 OFFICE O/P EST LOW 20 MIN: CPT | Performed by: STUDENT IN AN ORGANIZED HEALTH CARE EDUCATION/TRAINING PROGRAM

## 2022-11-28 RX ORDER — DOXYCYCLINE HYCLATE 100 MG
100 TABLET ORAL 2 TIMES DAILY
Qty: 28 TABLET | Refills: 0 | Status: SHIPPED | OUTPATIENT
Start: 2022-11-28 | End: 2022-12-12

## 2022-11-28 NOTE — PROGRESS NOTES
Assessment & Plan       ICD-10-CM    1. Abscess of skin of abdomen  L02.211 Adult General Surg Referral     doxycycline hyclate (VIBRA-TABS) 100 MG tablet      2. Morbid obesity, unspecified obesity type (H)  E66.01          Previously improved while on augmentin, unclear reason for antibiotic choice but will cover for MRSA with doxy. Atypical appearance for abscess vs cyst, and body habitus increases risk of procedure complexity or healing concerns. Recommend surgical clinic consult. Pt in agreement.     Patient Instructions   [facility]  [address]  [address] [place] [place] [address]  Appointments: [phone]     Agustin Ramirez MD, [facility]       Subjective     Coretta Huitron is a 42 year old female who presents to clinic today for the following health issues:    HPI   Chief Complaint   Patient presents with     Follow Up     Follow up on abdl wall lesion, suspected abscess. Feels more symptomatic recently. Previously has completed two rounds of augmentin with partial improvement. No drainage, fevers or chills. Pt has photos previously with more erythema than today, in an area of pannus that is hard for her to visualize.           Objective    /84 (BP Location: Right arm, Patient Position: Sitting, Cuff Size: Adult Large)   Pulse 73   Temp 98.1  F (36.7  C) (Temporal)   Resp 24   SpO2 98%   There is no height or weight on file to calculate BMI.  Alert, NAD, morbidly obese  NC/AT  Sclerae anicteric  Regular  Resp nonlabored  Skin warm and dry, R lower abdl wall with ecchymotic, indurated area without obvious fluctuance, erythema or drainage.  No focal neuro deficits. Speech intact.   Appropriate affect

## 2022-11-28 NOTE — NURSING NOTE
Chief Complaint   Patient presents with     Follow Up     Follow up on abscess on stomach, feels that it is starting to become worse again, has taken antibiotics for this, felt that it was getting better but not bruising is starting to come back and is starting to feel some pain      Pre-visit Screening:  Immunizations:  up to date  Colonoscopy:  NA  Mammogram: NA  Asthma Action Test/Plan:  NA  PHQ9:  NA  GAD7:  NA  Questioned patient about current smoking habits Pt. has never smoked.  Ok to leave detailed message on voice mail for today's visit only Yes, phone # [phone]

## 2022-11-28 NOTE — PATIENT INSTRUCTIONS
[facility]  [address]  [address] [place] [address]  Appointments: [phone]

## 2022-12-01 ENCOUNTER — OFFICE VISIT (OUTPATIENT)
Dept: SURGERY | Facility: CLINIC | Age: 42
End: 2022-12-01
Payer: COMMERCIAL

## 2022-12-01 VITALS
DIASTOLIC BLOOD PRESSURE: 80 MMHG | HEART RATE: 81 BPM | WEIGHT: 293 LBS | HEIGHT: 70 IN | BODY MASS INDEX: 41.95 KG/M2 | SYSTOLIC BLOOD PRESSURE: 136 MMHG

## 2022-12-01 DIAGNOSIS — L02.211 ABDOMINAL WALL ABSCESS: Primary | ICD-10-CM

## 2022-12-01 PROCEDURE — 99202 OFFICE O/P NEW SF 15 MIN: CPT | Mod: 25 | Performed by: SURGERY

## 2022-12-01 NOTE — LETTER
2022       Re: Coretta Huitron - 1980    This is a patient with a recurring area of swelling in the right low abdomen.  She has been on a couple courses of antibiotics, but she continues to have a soft raised area in the right lower abdomen.  She presents today with concern for persistent abscess.      Review of systems: Negative for drainage from the wound.  Negative for fever or chills     Physical exam:  The patient is in no apparent distress.  Breathing is nonlabored.  The abdomen reveals an area of ecchymosis and some fluctuance in the right lower quadrant.  This is on the patient's pannus.  There is no significant tenderness to suggest active infection.  It seems most consistent with chronic inflammatory tissue, likely related to intermittent infection.     I have recommended incision and debridement of the patient's abdominal wall abscess.  We discussed the procedure, along with its risks and complications, in detail.  The patient agreed to proceed.     Procedure:  Preoperative diagnosis: Abdominal wall abscess  Postoperative diagnosis: Same  Procedure: Incision and debridement of abdominal wall abscess  Surgeon: Malvin Padilla  Anesthesia: Local  Details of the operation: After informed consent, the patient was taken to the procedure room where she was sterilely prepped and draped.  Local anesthetic was injected around the area of fluctuance and an elliptical incision was then made overlying this.  We immediately entered an area of chronic inflammation with granulation tissue.  There was some fibrosis as well.  This was debrided back to healthy appearing subcutaneous tissue with a curette.  The wound was now packed with iodoform gauze.  A sterile dressing was placed.     Findings: Subcutaneous inflammatory tissue with chronic granulation.  No obvious purulent fluid.  The wound was widely opened down to healthy appearing subcutaneous fat.  Dissection was carried out using a scalpel.   The wound was packed using iodoform gauze.     The patient tolerated the procedure well and was discharged to home.     Assessment and plan: This is a patient with a recurring abscess on her abdominal wall.  This has now been widely opened into a healthy subcutaneous fat.  She should remove the packing in 24 to 36 hours.  She may return to see me if this has not adequately healed in the next couple of weeks.     A total of 20 minutes was spent in initial evaluation, chart review and documentation.     Malvin Padilla MD  [facility]

## 2022-12-01 NOTE — PROGRESS NOTES
This is a patient with a recurring area of swelling in the right low abdomen.  She has been on a couple courses of antibiotics, but she continues to have a soft raised area in the right lower abdomen.  She presents today with concern for persistent abscess.    Past Medical History:   Diagnosis Date     Allergic state, subsequent encounter 7/12/2017     Depression      Hypertension      Morbid obesity, unspecified obesity type (H) 7/12/2017     Right foot pain      Sleep apnea     uses CPAP     Tibia/fibula fracture 4/23/2014     Past Surgical History:   Procedure Laterality Date     BUNIONECTOMY  12/5/2013    Procedure: BUNIONECTOMY;   correct of bunion and bunionette deformation right with oteotomies;  Surgeon: Tanesha Rosa DPM;  Location: [facility] OR     HC CORRECT BUNION,SIMPLE  12/2004    fusion metatarsal     HC EXCIS PRIMARY GANGLION WRIST  1994, 1995    left      HC EXCIS RECURRENT GANGLION WRIST   2009, 2012    right     HC REMOVE TONSILS/ADENOIDS,<11 Y/O      age 8     LAPAROSCOPIC CHOLECYSTECTOMY N/A 11/4/2015    Procedure: LAPAROSCOPIC CHOLECYSTECTOMY;  Surgeon: Alyson Horne MD;  Location: [facility] OR     Review of systems: Negative for drainage from the wound.  Negative for fever or chills    Physical exam:  The patient is in no apparent distress.  Breathing is nonlabored.  The abdomen reveals an area of ecchymosis and some fluctuance in the right lower quadrant.  This is on the patient's pannus.  There is no significant tenderness to suggest active infection.  It seems most consistent with chronic inflammatory tissue, likely related to intermittent infection.    I have recommended incision and debridement of the patient's abdominal wall abscess.  We discussed the procedure, along with its risks and complications, in detail.  The patient agreed to proceed.    Procedure:  Preoperative diagnosis: Abdominal wall abscess  Postoperative diagnosis: Same  Procedure: Incision and debridement of abdominal  wall abscess  Surgeon: Malvin Padilla  Anesthesia: Local  Details of the operation: After informed consent, the patient was taken to the procedure room where she was sterilely prepped and draped.  Local anesthetic was injected around the area of fluctuance and an elliptical incision was then made overlying this.  We immediately entered an area of chronic inflammation with granulation tissue.  There was some fibrosis as well.  This was debrided back to healthy appearing subcutaneous tissue with a curette.  The wound was now packed with iodoform gauze.  A sterile dressing was placed.    Findings: Subcutaneous inflammatory tissue with chronic granulation.  No obvious purulent fluid.  The wound was widely opened down to healthy appearing subcutaneous fat.  Dissection was carried out using a scalpel.  The wound was packed using iodoform gauze.    The patient tolerated the procedure well and was discharged to home.    Assessment and plan: This is a patient with a recurring abscess on her abdominal wall.  This has now been widely opened into a healthy subcutaneous fat.  She should remove the packing in 24 to 36 hours.  She may return to see me if this has not adequately healed in the next couple of weeks.    A total of 20 minutes was spent in initial evaluation, chart review and documentation.    Malvin Padilla MD  [facility]    Please route or send letter to:  Primary Care Provider (PCP)

## 2022-12-04 NOTE — PROGRESS NOTES
SUBJECTIVE:   CC: Coretta Huitron is an 42 year old woman who presents for preventive health visit.           Patient has been advised of split billing requirements and indicates understanding: Yes  HPI    Did [place] testing with a new doctor  Test shows menopause per pt  LMP September             Hypertension Follow-up    BP Readings from Last 6 Encounters:   12/05/22 132/86   12/01/22 136/80   11/28/22 126/84   10/28/22 (!) 134/92   10/17/22 (!) 138/90   09/29/22 (!) 136/90       Depression and Anxiety Follow-Up    How are you doing with your depression since your last visit? No change    How are you doing with your anxiety since your last visit?  No change    Are you having other symptoms that might be associated with depression or anxiety? No    Have you had a significant life event? No     Do you have any concerns with your use of alcohol or other drugs? No    Social History     Tobacco Use     Smoking status: Never     Smokeless tobacco: Never   Substance Use Topics     Alcohol use: Yes     Alcohol/week: 0.0 standard drinks     Comment: very rare     Drug use: No     PHQ 1/18/2022 1/20/2022 9/29/2022   PHQ-9 Total Score 12 11 7   Q9: Thoughts of better off dead/self-harm past 2 weeks Not at all Not at all Not at all     DANIA-7 SCORE 1/18/2022 1/20/2022 9/29/2022   Total Score 5 12 9         Suicide Assessment Five-step Evaluation and Treatment (SAFE-T)      Today's PHQ-2 Score: No flowsheet data found.    Reviewed orders with patient.  Reviewed health maintenance and updated orders accordingly - Yes      Lab work is in process  Labs reviewed in EPIC  BP Readings from Last 3 Encounters:   12/05/22 132/86   12/01/22 136/80   11/28/22 126/84    Wt Readings from Last 3 Encounters:   12/05/22 (!) 197.3 kg (435 lb)   12/01/22 (!) 197.3 kg (435 lb)   03/11/22 (!) 197 kg (434 lb 3.2 oz)                  Patient Active Problem List   Diagnosis     Irritable bowel syndrome     Benign essential hypertension     ACP  (advance care planning)     Health Care Home     Obstructive sleep apnea syndrome     Vitamin D deficiency     Tooth decay/ multiple cavities      Knee pain     Major depressive disorder, recurrent episode, moderate (H)     Allergic state, subsequent encounter     Morbid obesity, unspecified obesity type (H)     PCOS (polycystic ovarian syndrome) - per [facility]     DANIA (generalized anxiety disorder)     Panic attack     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     Thoracic ascending aortic aneurysm     Past Surgical History:   Procedure Laterality Date     BUNIONECTOMY  2013    Procedure: BUNIONECTOMY;   correct of bunion and bunionette deformation right with oteotomies;  Surgeon: Tanesha Rosa DPM;  Location: [facility] OR     HC CORRECT BUNION,SIMPLE  2004    fusion metatarsal     HC EXCIS PRIMARY GANGLION WRIST  ,     left      HC EXCIS RECURRENT GANGLION WRIST   ,     right     HC REMOVE TONSILS/ADENOIDS,<13 Y/O      age 8     LAPAROSCOPIC CHOLECYSTECTOMY N/A 2015    Procedure: LAPAROSCOPIC CHOLECYSTECTOMY;  Surgeon: Alyson Horne MD;  Location: [facility] OR       Social History     Tobacco Use     Smoking status: Never     Smokeless tobacco: Never   Substance Use Topics     Alcohol use: Yes     Alcohol/week: 0.0 standard drinks     Comment: very rare     Family History   Problem Relation Age of Onset     Cancer Maternal Grandfather      Cancer Paternal Grandfather      Colon Cancer Paternal Grandfather      Thyroid Disease Mother      Hypertension Father      Coronary Artery Disease Father 65        MI -  in      Sleep Apnea Father      Thyroid Disease Maternal Grandmother          Current Outpatient Medications   Medication Sig Dispense Refill     cholecalciferol (VITAMIN D3) 5000 UNITS CAPS capsule Take by mouth daily       clonazePAM (KLONOPIN) 1 MG tablet Take one as needed for anxiety 10 tablet 0     cyclobenzaprine (FLEXERIL) 10 MG tablet Take one as needed for  tension headaches 30 tablet 0     doxycycline hyclate (VIBRA-TABS) 100 MG tablet Take 1 tablet (100 mg) by mouth 2 times daily for 14 days 28 tablet 0     escitalopram (LEXAPRO) 20 MG tablet Take 1 tablet (20 mg) by mouth daily 90 tablet 1     fexofenadine (ALLEGRA) 180 MG tablet Take 180 mg by mouth daily       hydrOXYzine (VISTARIL) 25 MG capsule Take 1-2 tablets up to 3 x a day for anxiety 90 capsule 0     losartan (COZAAR) 50 MG tablet Take 1 tablet (50 mg) by mouth daily 90 tablet 3     medroxyPROGESTERone (PROVERA) 10 MG tablet Take 10 mg by mouth once as needed       buPROPion (WELLBUTRIN XL) 300 MG 24 hr tablet Take 1 tablet (300 mg) by mouth every morning 90 tablet 1     Allergies   Allergen Reactions     No Known Drug Allergies      Recent Labs   Lab Test 09/29/22  0000 01/27/22  0000 01/18/22  1259 01/18/22  1141 12/17/20  1455 12/11/18  1249 04/05/17  0840 07/11/16  2353 05/17/16  1119 10/30/15  1130   LDL  --  68  --   --   --  89  --   --  86  --    HDL  --  62  --   --   --  58  --   --  56  --    TRIG  --  99  --   --   --  146  --   --  98  --    ALT  --   --   --   --   --   --   --  19  --  26   CR 0.87  --  0.86  --   --   --  0.61 0.63  --  0.62   GFRESTIMATED  --   --   --   --   --   --  >90  Non  GFR Calc   >90  Non  GFR Calc    --  >90  Non  GFR Calc     GFRESTBLACK  --   --   --   --   --   --  >90   GFR Calc   >90   GFR Calc    --  >90   GFR Calc     POTASSIUM 4.05  --  4.28  --   --   --  4.4 3.4  --  3.6   TSH  --   --   --  5.05* 2.20  --   --   --   --   --                            Past Medical History:   Diagnosis Date     Allergic state, subsequent encounter 7/12/2017     Depression      Hypertension      Morbid obesity, unspecified obesity type (H) 7/12/2017     Right foot pain      Sleep apnea     uses CPAP     Tibia/fibula fracture 4/23/2014      Past Surgical History:   Procedure  "Laterality Date     BUNIONECTOMY  12/5/2013    Procedure: BUNIONECTOMY;   correct of bunion and bunionette deformation right with oteotomies;  Surgeon: Tanesha Rosa DPM;  Location: [facility] OR     HC CORRECT BUNION,SIMPLE  12/2004    fusion metatarsal     HC EXCIS PRIMARY GANGLION WRIST  1994, 1995    left      HC EXCIS RECURRENT GANGLION WRIST   2009, 2012    right     HC REMOVE TONSILS/ADENOIDS,<13 Y/O      age 8     LAPAROSCOPIC CHOLECYSTECTOMY N/A 11/4/2015    Procedure: LAPAROSCOPIC CHOLECYSTECTOMY;  Surgeon: Alyson Horne MD;  Location: [facility] OR       Review of Systems  CONSTITUTIONAL: NEGATIVE for fever, chills, change in weight  INTEGUMENTARU/SKIN: NEGATIVE for worrisome rashes, moles or lesions  EYES: NEGATIVE for vision changes or irritation  ENT: NEGATIVE for ear, mouth and throat problems  RESP: NEGATIVE for significant cough or SOB  BREAST: NEGATIVE for masses, tenderness or discharge  CV: NEGATIVE for chest pain, palpitations or peripheral edema  GI: NEGATIVE for nausea, abdominal pain, heartburn, or change in bowel habits  : NEGATIVE for unusual urinary or vaginal symptoms. Periods are irregular  MUSCULOSKELETAL: NEGATIVE for significant arthralgias or myalgia  NEURO: NEGATIVE for weakness, dizziness or paresthesias  PSYCHIATRIC: NEGATIVE for changes in mood or affect     OBJECTIVE:   /86 (BP Location: Right arm, Patient Position: Sitting, Cuff Size: Adult Large)   Pulse 78   Temp 98  F (36.7  C) (Temporal)   Resp 20   Ht 1.74 m (5' 8.5\")   Wt (!) 197.3 kg (435 lb)   LMP 09/28/2022 (Exact Date)   SpO2 99%   BMI 65.18 kg/m    Physical Exam  GENERAL:  alert and no distress  EYES: Eyes grossly normal to inspection, PERRL and conjunctivae and sclerae normal  HENT: ear canals and TM's normal, nose and mouth without ulcers or lesions  NECK: no adenopathy, no asymmetry, masses, or scars and thyroid normal to palpation  RESP: lungs clear to auscultation - no rales, rhonchi or " wheezes  BREAST: normal without masses, tenderness or nipple discharge and no palpable axillary masses or adenopathy  CV: regular rate and rhythm, normal S1 S2, no S3 or S4, no murmur, click or rub, no peripheral edema and peripheral pulses strong  ABDOMEN: soft, nontender, no hepatosplenomegaly, no masses and bowel sounds normal   (female): normal female external genitalia, normal urethral meatus, vaginal mucosa pink, moist, well rugated, and normal cervix/adnexa/uterus without masses or discharge  MS: no gross musculoskeletal defects noted, no edema  SKIN: no suspicious lesions or rashes  NEURO: Normal strength and tone, mentation intact and speech normal  PSYCH: mentation appears normal, affect normal/bright    Diagnostic Test Results:  Labs reviewed in Epic    ASSESSMENT/PLAN:   Coretta was seen today for physical and wound check.    Diagnoses and all orders for this visit:    Routine general medical examination at a health care facility  -     VENOUS COLLECTION  -     Lipid Panel ([facility])    Aneurysm of ascending aorta without rupture    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    Panic attack  -     hydrOXYzine (VISTARIL) 25 MG capsule; Take 1-2 tablets up to 3 x a day for anxiety  -     clonazePAM (KLONOPIN) 1 MG tablet; Take one as needed for anxiety  No benzo refills w/o OV  Using sparingly  I reviewed the patient information handout from Up To Date on this medication including side effects with the patient.        DANIA (generalized anxiety disorder)  -     buPROPion (WELLBUTRIN XL) 300 MG 24 hr tablet; Take 1 tablet (300 mg) by mouth every morning  -     escitalopram (LEXAPRO) 20 MG tablet; Take 1 tablet (20 mg) by mouth daily    PCOS (polycystic ovarian syndrome) - per [facility]  -     VENOUS COLLECTION  -     FOLLICLE STIMULATING HORMONE ([facility])  -     Lutropin ([facility])    Morbid obesity, unspecified obesity type (H)    Major depressive disorder, recurrent episode, moderate (H)  -     buPROPion (WELLBUTRIN  "XL) 300 MG 24 hr tablet; Take 1 tablet (300 mg) by mouth every morning  -     escitalopram (LEXAPRO) 20 MG tablet; Take 1 tablet (20 mg) by mouth daily    Vitamin D deficiency  -     VENOUS COLLECTION  -     VITAMIN D DEFICIENCY SCREENING ([facility])    Obstructive sleep apnea syndrome    Benign essential hypertension  -     VENOUS COLLECTION  -     Basic Metabolic Panel (BFP)  -     losartan (COZAAR) 50 MG tablet; Take 1 tablet (50 mg) by mouth daily    Other irritable bowel syndrome    Irregular menses  -     FOLLICLE STIMULATING HORMONE ([facility])  -     Lutropin ([facility])    LABS SHOW SHE IS MENOPAUSAL - WILL NOT PRESCRIBE PROGESTERONE Q3 MONTHS ANYMORE    Screening for cervical cancer  -     ThinPrep Pap and HPV (mRNA E6/E7) ([facility])        Patient has been advised of split billing requirements and indicates understanding: Yes    COUNSELING:  Reviewed preventive health counseling, as reflected in patient instructions    Estimated body mass index is 65.18 kg/m  as calculated from the following:    Height as of this encounter: 1.74 m (5' 8.5\").    Weight as of this encounter: 197.3 kg (435 lb).    Weight management plan: Discussed healthy diet and exercise guidelines    She reports that she has never smoked. She has never used smokeless tobacco.      Counseling Resources:  ATP IV Guidelines  Pooled Cohorts Equation Calculator  Breast Cancer Risk Calculator  BRCA-Related Cancer Risk Assessment: FHS-7 Tool  FRAX Risk Assessment  ICSI Preventive Guidelines  Dietary Guidelines for Americans, 2010  USDA's MyPlate  ASA Prophylaxis  Lung CA Screening      I have advised to this patient that I will be leaving [facility] end of the year. I have suggested  he/she est. Care with one of our providers in follow up.       Bhavana Joseph, PA  [facility]    "

## 2022-12-05 ENCOUNTER — OFFICE VISIT (OUTPATIENT)
Dept: FAMILY MEDICINE | Facility: CLINIC | Age: 42
End: 2022-12-05

## 2022-12-05 VITALS
HEIGHT: 69 IN | RESPIRATION RATE: 20 BRPM | BODY MASS INDEX: 43.4 KG/M2 | WEIGHT: 293 LBS | OXYGEN SATURATION: 99 % | HEART RATE: 78 BPM | TEMPERATURE: 98 F | SYSTOLIC BLOOD PRESSURE: 132 MMHG | DIASTOLIC BLOOD PRESSURE: 86 MMHG

## 2022-12-05 DIAGNOSIS — I10 BENIGN ESSENTIAL HYPERTENSION: ICD-10-CM

## 2022-12-05 DIAGNOSIS — E28.2 PCOS (POLYCYSTIC OVARIAN SYNDROME): ICD-10-CM

## 2022-12-05 DIAGNOSIS — E66.01 MORBID OBESITY, UNSPECIFIED OBESITY TYPE (H): ICD-10-CM

## 2022-12-05 DIAGNOSIS — F33.1 MAJOR DEPRESSIVE DISORDER, RECURRENT EPISODE, MODERATE (H): ICD-10-CM

## 2022-12-05 DIAGNOSIS — Z12.4 SCREENING FOR CERVICAL CANCER: ICD-10-CM

## 2022-12-05 DIAGNOSIS — I71.21 ANEURYSM OF ASCENDING AORTA WITHOUT RUPTURE (H): ICD-10-CM

## 2022-12-05 DIAGNOSIS — F41.0 PANIC ATTACK: ICD-10-CM

## 2022-12-05 DIAGNOSIS — N92.6 IRREGULAR MENSES: ICD-10-CM

## 2022-12-05 DIAGNOSIS — F41.1 GAD (GENERALIZED ANXIETY DISORDER): ICD-10-CM

## 2022-12-05 DIAGNOSIS — I51.7 ACQUIRED DILATION OF LEFT VENTRICLE OF HEART: ICD-10-CM

## 2022-12-05 DIAGNOSIS — E55.9 VITAMIN D DEFICIENCY: ICD-10-CM

## 2022-12-05 DIAGNOSIS — Z00.00 ROUTINE GENERAL MEDICAL EXAMINATION AT A HEALTH CARE FACILITY: Primary | ICD-10-CM

## 2022-12-05 DIAGNOSIS — G47.33 OBSTRUCTIVE SLEEP APNEA SYNDROME: ICD-10-CM

## 2022-12-05 DIAGNOSIS — K58.8 OTHER IRRITABLE BOWEL SYNDROME: ICD-10-CM

## 2022-12-05 LAB
BUN SERPL-MCNC: 10 MG/DL (ref 7–25)
BUN/CREATININE RATIO: 11.6 (ref 6–22)
CALCIUM SERPL-MCNC: 9.7 MG/DL (ref 8.6–10.3)
CHLORIDE SERPLBLD-SCNC: 103.4 MMOL/L (ref 98–110)
CHOLEST SERPL-MCNC: 179 MG/DL (ref 0–199)
CHOLEST/HDLC SERPL: 3 {RATIO} (ref 0–5)
CO2 SERPL-SCNC: 27.6 MMOL/L (ref 20–32)
CREAT SERPL-MCNC: 0.86 MG/DL (ref 0.6–1.3)
GLUCOSE SERPL-MCNC: 91 MG/DL (ref 60–99)
HDLC SERPL-MCNC: 69 MG/DL (ref 40–150)
LDLC SERPL CALC-MCNC: 89 MG/DL (ref 0–130)
POTASSIUM SERPL-SCNC: 4.17 MMOL/L (ref 3.5–5.3)
SODIUM SERPL-SCNC: 138.2 MMOL/L (ref 135–146)
TRIGL SERPL-MCNC: 103 MG/DL (ref 0–149)

## 2022-12-05 PROCEDURE — 80048 BASIC METABOLIC PNL TOTAL CA: CPT | Performed by: PHYSICIAN ASSISTANT

## 2022-12-05 PROCEDURE — 83002 ASSAY OF GONADOTROPIN (LH): CPT | Mod: 90 | Performed by: PHYSICIAN ASSISTANT

## 2022-12-05 PROCEDURE — 36415 COLL VENOUS BLD VENIPUNCTURE: CPT | Performed by: PHYSICIAN ASSISTANT

## 2022-12-05 PROCEDURE — 83001 ASSAY OF GONADOTROPIN (FSH): CPT | Mod: 90 | Performed by: PHYSICIAN ASSISTANT

## 2022-12-05 PROCEDURE — 88142 CYTOPATH C/V THIN LAYER: CPT | Mod: 90 | Performed by: PHYSICIAN ASSISTANT

## 2022-12-05 PROCEDURE — 99396 PREV VISIT EST AGE 40-64: CPT | Performed by: PHYSICIAN ASSISTANT

## 2022-12-05 PROCEDURE — 82306 VITAMIN D 25 HYDROXY: CPT | Mod: 90 | Performed by: PHYSICIAN ASSISTANT

## 2022-12-05 PROCEDURE — 80061 LIPID PANEL: CPT | Performed by: PHYSICIAN ASSISTANT

## 2022-12-05 RX ORDER — LOSARTAN POTASSIUM 50 MG/1
50 TABLET ORAL DAILY
Qty: 90 TABLET | Refills: 3 | Status: SHIPPED | OUTPATIENT
Start: 2022-12-05 | End: 2024-01-09

## 2022-12-05 RX ORDER — BUPROPION HYDROCHLORIDE 300 MG/1
300 TABLET ORAL EVERY MORNING
Qty: 90 TABLET | Refills: 1 | Status: SHIPPED | OUTPATIENT
Start: 2022-12-05 | End: 2023-06-19

## 2022-12-05 RX ORDER — CLONAZEPAM 1 MG/1
TABLET ORAL
Qty: 10 TABLET | Refills: 0 | Status: SHIPPED | OUTPATIENT
Start: 2022-12-05 | End: 2024-01-22

## 2022-12-05 RX ORDER — HYDROXYZINE PAMOATE 25 MG/1
CAPSULE ORAL
Qty: 90 CAPSULE | Refills: 0 | Status: SHIPPED | OUTPATIENT
Start: 2022-12-05 | End: 2023-06-19

## 2022-12-05 RX ORDER — ESCITALOPRAM OXALATE 20 MG/1
20 TABLET ORAL DAILY
Qty: 90 TABLET | Refills: 1 | Status: SHIPPED | OUTPATIENT
Start: 2022-12-05 | End: 2023-06-19

## 2022-12-05 NOTE — NURSING NOTE
Chief Complaint   Patient presents with     Physical     Annual, fasting      Wound Check     Wants wound on stomach to be checked, did see surgeon for this, wants to make sure that it is healing after being drained     Pre-visit Screening:  Immunizations:  up to date  Colonoscopy:  NA  Mammogram: NA  Asthma Action Test/Plan:  NA  PHQ9: NA  GAD7:  NA  Questioned patient about current smoking habits Pt. has never smoked.  Ok to leave detailed message on voice mail for today's visit only Yes, phone # [phone]

## 2022-12-06 LAB
FSH SERPL-ACNC: 47.2 MIU/ML
LH, SERUM: 25 MIU/ML
VITAMIN D, 25-OH, TOTAL - QUEST: 36 NG/ML (ref 30–100)

## 2022-12-08 LAB
CLINICAL HISTORY - QUEST: NORMAL
COMMENT - QUEST: NORMAL
CYTOTECHNOLOGIST - QUEST: NORMAL
DESCRIPTIVE DIAGNOSIS - QUEST: NORMAL
LAST PAP DX - QUEST: NORMAL
LMP - QUEST: NORMAL
PREV BX DX - QUEST: NORMAL
SOURCE: NORMAL
STATEMENT OF ADEQUACY - QUEST: NORMAL

## 2022-12-20 ENCOUNTER — OFFICE VISIT (OUTPATIENT)
Dept: FAMILY MEDICINE | Facility: CLINIC | Age: 42
End: 2022-12-20

## 2022-12-20 VITALS
OXYGEN SATURATION: 98 % | DIASTOLIC BLOOD PRESSURE: 82 MMHG | SYSTOLIC BLOOD PRESSURE: 128 MMHG | TEMPERATURE: 98 F | BODY MASS INDEX: 65.33 KG/M2 | WEIGHT: 293 LBS | HEART RATE: 75 BPM | RESPIRATION RATE: 24 BRPM

## 2022-12-20 DIAGNOSIS — M17.11 PRIMARY OSTEOARTHRITIS OF RIGHT KNEE: ICD-10-CM

## 2022-12-20 DIAGNOSIS — G89.29 CHRONIC PAIN OF RIGHT KNEE: Primary | ICD-10-CM

## 2022-12-20 DIAGNOSIS — E66.01 MORBID OBESITY, UNSPECIFIED OBESITY TYPE (H): ICD-10-CM

## 2022-12-20 DIAGNOSIS — M25.561 PATELLOFEMORAL ARTHRALGIA OF RIGHT KNEE: ICD-10-CM

## 2022-12-20 DIAGNOSIS — M25.561 CHRONIC PAIN OF RIGHT KNEE: Primary | ICD-10-CM

## 2022-12-20 PROCEDURE — 99214 OFFICE O/P EST MOD 30 MIN: CPT | Performed by: STUDENT IN AN ORGANIZED HEALTH CARE EDUCATION/TRAINING PROGRAM

## 2022-12-20 PROCEDURE — 73562 X-RAY EXAM OF KNEE 3: CPT | Mod: RT | Performed by: STUDENT IN AN ORGANIZED HEALTH CARE EDUCATION/TRAINING PROGRAM

## 2022-12-20 NOTE — PROGRESS NOTES
ASSESSMENT & PLAN      ICD-10-CM    1. Chronic pain of right knee  M25.561 XR Knee Right 3 Views    G89.29 Physical Therapy Referral      2. Primary osteoarthritis of right knee  M17.11 Physical Therapy Referral      3. Patellofemoral arthralgia of right knee  M25.561 Physical Therapy Referral      4. Morbid obesity, unspecified obesity type (H)  E66.01            Patient Instructions   Xrays show arthritis/cartilage wear, worse behind knee caps     Consider NSAID for swelling    Ice 10-15 min at a time    [place] schedulers will reach out for PT    Keep up weight loss efforts    Let me know how you're doing over next couple months    >30 minutes spent on the date of the encounter doing chart review, history and exam, documentation and further activities per the note.    Agustin Ramirez MD, [facility]      -----    SUBJECTIVE  Coretta Huitron is a/an 42 year old female who is seen for evaluation of     Chief Complaint   Patient presents with     Consult For     Right knee pain that has been around for the past 3 weeks now, only feels the pain while going up stairs, feels more of a sharp pain, no fall or injury known, is able to walk and kneel ok, did have COVID recently and is not sure if this could be weak muscles from that but it has not improved       The patient is seen by themselves.    Date of Onset: increasing pain over last few weeks  Mechanism of injury: Reports insidious onset without acute precipitating event.  Worsened by: stairs (up and down)  Better with: rest  Treatments tried: rest/activity avoidance and ibuprofen  Associated symptoms: no distal numbness or tingling; denies swelling or warmth    Orthopedic/Surgical history: R tibial plateau fracture, nonop mgmt ~8 yrs ago    Patient's PMH, PSH, and family hx reviewed.      OBJECTIVE:  /82 (BP Location: Left arm, Patient Position: Sitting, Cuff Size: Adult Large)   Pulse 75   Temp 98  F (36.7  C) (Temporal)   Resp  24   Wt (!) 197.8 kg (436 lb)   SpO2 98%   BMI 65.33 kg/m     General: healthy, alert and in no distress  HEENT: no scleral icterus or conjunctival erythema  Skin: no visible suspicious lesions or rashes.   CV: no pedal edema   Resp: normal respiratory effort without conversational dyspnea   Psych: normal mood and affect    MSK:   R knee without acute deformity, trace effusion  ROM 0-120, ext mech intact  TTP medial joint line and patellar facet  Neg Lachman, stable varus/valg stress  Neg hyperflexion  Neg apprehension  CMS intact distally    RADIOLOGY:  Results for orders placed or performed in visit on 12/20/22   XR Knee Right 3 Views     Status: None    Narrative    Radiologist Consultation/:   Fax:  [phone]  [phone]  _____________________________________________________________________________________________________________________________________________________________________________________________________________________________________________________________________________________________________________________________________________________________________________________________________________________________________________________________________________________________________  PATIENT NAME: DOSDALL, SABINA  YOB: 1980 Age: 42 ACCESSION NUMBER: [id number]  SEX: F ORDERING PROVIDER: Agustin James  FACILITY: [facility] PRIMARY PROVIDER:  PATIENT ID: [id number] INTERESTED PARTY:  Page 1 of 1  _________________________________________________________________________________________________________________________________________________________________________________________________________________________________________________________________________________________________________________________________________________________________________________________  EXAM: XRAY KNEE,3 VWS RIGHT  LOCATION: [facility]  DATE/TIME: 12/20/2022 2:20 PM  INDICATION: Right knee pain.  COMPARISON: None.  IMPRESSION: No acute fracture or malalignment. Moderate patellofemoral compartment degenerative  changes. Mild medial and lateral compartment marginal bony spurring. No knee joint effusion.  SIGNED BY: Agustin Oneill MD 12/21/2022 12:55 PM

## 2022-12-20 NOTE — PATIENT INSTRUCTIONS
Xrays show arthritis/cartilage wear, worse behind knee caps     Consider NSAID for swelling    Ice 10-15 min at a time    [place] schedulers will reach out for PT    Keep up weight loss efforts    Let me know how you're doing over next couple months

## 2022-12-20 NOTE — NURSING NOTE
Chief Complaint   Patient presents with     Consult For     Right knee pain that has been around for the past 3 weeks now, only feels the pain while going up stairs, feels more of a sharp pain, no fall or injury known, is able to walk and kneel ok, did have COVID recently and is not sure if this could be weak muscles from that but it has not improved     Pre-visit Screening:  Immunizations:  up to date  Colonoscopy:  NA  Mammogram: NA  Asthma Action Test/Plan:  NA  PHQ9:  NA  GAD7:  NA  Questioned patient about current smoking habits Pt. has never smoked.  Ok to leave detailed message on voice mail for today's visit only yes, phone # [phone]

## 2022-12-26 ENCOUNTER — THERAPY VISIT (OUTPATIENT)
Dept: PHYSICAL THERAPY | Facility: CLINIC | Age: 42
End: 2022-12-26
Attending: STUDENT IN AN ORGANIZED HEALTH CARE EDUCATION/TRAINING PROGRAM
Payer: COMMERCIAL

## 2022-12-26 DIAGNOSIS — M25.561 PATELLOFEMORAL ARTHRALGIA OF RIGHT KNEE: ICD-10-CM

## 2022-12-26 DIAGNOSIS — M17.11 PRIMARY OSTEOARTHRITIS OF RIGHT KNEE: ICD-10-CM

## 2022-12-26 DIAGNOSIS — M25.561 RIGHT KNEE PAIN: ICD-10-CM

## 2022-12-26 DIAGNOSIS — G89.29 CHRONIC PAIN OF RIGHT KNEE: ICD-10-CM

## 2022-12-26 DIAGNOSIS — M25.561 CHRONIC PAIN OF RIGHT KNEE: ICD-10-CM

## 2022-12-26 PROCEDURE — 97161 PT EVAL LOW COMPLEX 20 MIN: CPT | Mod: GP | Performed by: PHYSICAL THERAPIST

## 2022-12-26 PROCEDURE — 97110 THERAPEUTIC EXERCISES: CPT | Mod: GP | Performed by: PHYSICAL THERAPIST

## 2022-12-26 ASSESSMENT — ACTIVITIES OF DAILY LIVING (ADL)
LIMPING: I HAVE THE SYMPTOM BUT IT DOES NOT AFFECT MY ACTIVITY
SIT WITH YOUR KNEE BENT: NOT ANSWERED
SQUAT: NOT ANSWERED
GIVING WAY, BUCKLING OR SHIFTING OF KNEE: I DO NOT HAVE THE SYMPTOM
PAIN: THE SYMPTOM AFFECTS MY ACTIVITY SLIGHTLY
GO UP STAIRS: NOT ANSWERED
WEAKNESS: THE SYMPTOM AFFECTS MY ACTIVITY SLIGHTLY
WALK: NOT ANSWERED
SWELLING: I HAVE THE SYMPTOM BUT IT DOES NOT AFFECT MY ACTIVITY
RISE FROM A CHAIR: NOT ANSWERED
STIFFNESS: I HAVE THE SYMPTOM BUT IT DOES NOT AFFECT MY ACTIVITY
KNEEL ON THE FRONT OF YOUR KNEE: NOT ANSWERED
GO DOWN STAIRS: NOT ANSWERED
STAND: NOT ANSWERED
KNEE_ACTIVITY_OF_DAILY_LIVING_SUM: 22

## 2022-12-26 NOTE — PROGRESS NOTES
Physical Therapy Initial Evaluation  Subjective:  The history is provided by the patient.   Patient Health History  Coretta Huitron being seen for R knee pain .     Problem began: 12/20/2022 (MD visit).   Problem occurred: after COVID and activity reduction    Pain is reported as 6/10 (with stairs) on pain scale.  General health as reported by patient is good.  Pertinent medical history includes: depression, high blood pressure, menopausal, overweight and sleep disorder/apnea.     Medical allergies: none.   Surgeries include:  Orthopedic surgery and other. Other surgery history details: B bunionectomy, gallbladder removal, tonsils.    Current medications:  High blood pressure medication and anti-depressants.    Current occupation is Nursing Instructor .   Primary job tasks include:  Computer work, driving, prolonged sitting and pushing/pulling.                  Therapist Generated HPI Evaluation  Problem details: Pt reports 4 week onset of R sided knee pain. Pt reports she got COVID on Halloween and then had a reduction in activity level, then hurt with stairs. Currently going up and down stairs with step to pattern to manage pain. Pt starting weight loss program in January for three months. Of note, pt did have knee injury (MCL strain and tibial fracture) 9-10 years ago, has baker's cyst on R side. .         Type of problem:  Right knee.    This is a new condition.      Patient reports pain:  Anterior (distal patella).  Pain is described as sharp and is intermittent.    Since onset symptoms are unchanged.  Associated symptoms:  Loss of strength. Symptoms are exacerbated by descending stairs, ascending stairs and bending/squatting  Relieved by: essential oils.  Special tests included:  X-ray (Moderate patellofemoral compartment degenerative ).    Restrictions due to condition include:  Working in normal job without restrictions.                          Objective:  System                                                 Knee Evaluation:  ROM:  Arom wnl knee: limited by body habitus.    AROM      Extension: Left:    Right:  0  Flexion: Left: 125    Right: 125        Strength:     Extension:  Left: 4+/5   Pain:      Right: 4+/5   Pain:  Flexion:  Left: 4+/5   Pain:      Right: 4+/5   Pain:          Special Tests:     Left knee negative for the following special tests:  Meninscal    Right knee negative for the following special tests:  Meniscal  Palpation:      Right knee tenderness present at:  Medial Joint Line; Lateral Joint Line and Patellar Tendon  Edema:  Normal    Mobility Testing:  Normal            Functional Testing:  : impaired balance B, pain with mini squats.                  General     ROS    Assessment/Plan:    Patient is a 42 year old female with right side knee complaints.    Patient has the following significant findings with corresponding treatment plan.                Diagnosis 1:  R Knee pain   Pain -  self management, education and home program  Decreased strength - therapeutic exercise, therapeutic activities and home program  Impaired balance - neuro re-education, therapeutic activities and home program  Impaired muscle performance - neuro re-education and home program  Decreased function - therapeutic activities and home program  Cumulative Therapy Evaluation is: Low complexity.    Previous and current functional limitations:  (See Goal Flow Sheet for this information)    Short term and Long term goals: (See Goal Flow Sheet for this information)     Communication ability:  Patient appears to be able to clearly communicate and understand verbal and written communication and follow directions correctly.  Treatment Explanation - The following has been discussed with the patient:   RX ordered/plan of care  Anticipated outcomes  Possible risks and side effects  This patient would benefit from PT intervention to resume normal activities.   Rehab potential is good.    Frequency:  1 X week, once daily  Duration:   for 6 weeks  Discharge Plan:  Achieve all LTG.  Independent in home treatment program.  Reach maximal therapeutic benefit.    Please refer to the daily flowsheet for treatment today, total treatment time and time spent performing 1:1 timed codes.

## 2023-01-02 ENCOUNTER — THERAPY VISIT (OUTPATIENT)
Dept: PHYSICAL THERAPY | Facility: CLINIC | Age: 43
End: 2023-01-02
Attending: STUDENT IN AN ORGANIZED HEALTH CARE EDUCATION/TRAINING PROGRAM
Payer: COMMERCIAL

## 2023-01-02 DIAGNOSIS — M25.561 RIGHT KNEE PAIN: Primary | ICD-10-CM

## 2023-01-02 PROCEDURE — 97110 THERAPEUTIC EXERCISES: CPT | Mod: GP | Performed by: PHYSICAL THERAPIST

## 2023-01-02 PROCEDURE — 97530 THERAPEUTIC ACTIVITIES: CPT | Mod: GP | Performed by: PHYSICAL THERAPIST

## 2023-01-09 ENCOUNTER — THERAPY VISIT (OUTPATIENT)
Dept: PHYSICAL THERAPY | Facility: CLINIC | Age: 43
End: 2023-01-09
Attending: STUDENT IN AN ORGANIZED HEALTH CARE EDUCATION/TRAINING PROGRAM
Payer: COMMERCIAL

## 2023-01-09 DIAGNOSIS — M25.561 RIGHT KNEE PAIN, UNSPECIFIED CHRONICITY: Primary | ICD-10-CM

## 2023-01-09 PROCEDURE — 97110 THERAPEUTIC EXERCISES: CPT | Mod: GP | Performed by: PHYSICAL THERAPIST

## 2023-01-16 DIAGNOSIS — F33.1 MAJOR DEPRESSIVE DISORDER, RECURRENT EPISODE, MODERATE (H): ICD-10-CM

## 2023-01-16 DIAGNOSIS — F41.1 GAD (GENERALIZED ANXIETY DISORDER): ICD-10-CM

## 2023-01-17 RX ORDER — ESCITALOPRAM OXALATE 20 MG/1
TABLET ORAL
Qty: 90 TABLET | Refills: 1 | COMMUNITY
Start: 2023-01-17

## 2023-01-17 NOTE — TELEPHONE ENCOUNTER
Coretta Huitron is requesting a refill of:    Refused Prescriptions:                       Disp   Refills    escitalopram (LEXAPRO) 20 MG tablet [Pharm*90 tab*1        Sig: TAKE 1 TABLET(20 MG) BY MOUTH DAILY  Refused By: GUERITA LAWRENCE  Reason for Refusal: Should already have refills on file  Reason for Refusal Comment: Refills sent in 12/05/22 for 90 with 1 refill    Refills sent on 12/05/22 for 90 with 1 refill, pt should have refills on file

## 2023-01-23 ENCOUNTER — THERAPY VISIT (OUTPATIENT)
Dept: PHYSICAL THERAPY | Facility: CLINIC | Age: 43
End: 2023-01-23
Payer: COMMERCIAL

## 2023-01-23 DIAGNOSIS — M25.561 RIGHT KNEE PAIN, UNSPECIFIED CHRONICITY: Primary | ICD-10-CM

## 2023-01-23 PROCEDURE — 97530 THERAPEUTIC ACTIVITIES: CPT | Mod: GP | Performed by: PHYSICAL THERAPIST

## 2023-01-23 PROCEDURE — 97110 THERAPEUTIC EXERCISES: CPT | Mod: GP | Performed by: PHYSICAL THERAPIST

## 2023-01-23 PROCEDURE — 97112 NEUROMUSCULAR REEDUCATION: CPT | Mod: GP | Performed by: PHYSICAL THERAPIST

## 2023-02-06 ENCOUNTER — THERAPY VISIT (OUTPATIENT)
Dept: PHYSICAL THERAPY | Facility: CLINIC | Age: 43
End: 2023-02-06
Payer: COMMERCIAL

## 2023-02-06 DIAGNOSIS — M25.561 CHRONIC PAIN OF RIGHT KNEE: Primary | ICD-10-CM

## 2023-02-06 DIAGNOSIS — G89.29 CHRONIC PAIN OF RIGHT KNEE: Primary | ICD-10-CM

## 2023-02-06 PROCEDURE — 97110 THERAPEUTIC EXERCISES: CPT | Mod: GP | Performed by: PHYSICAL THERAPIST

## 2023-02-06 PROCEDURE — 97530 THERAPEUTIC ACTIVITIES: CPT | Mod: GP | Performed by: PHYSICAL THERAPIST

## 2023-03-13 ENCOUNTER — THERAPY VISIT (OUTPATIENT)
Dept: PHYSICAL THERAPY | Facility: CLINIC | Age: 43
End: 2023-03-13
Payer: COMMERCIAL

## 2023-03-13 DIAGNOSIS — G89.29 CHRONIC PAIN OF RIGHT KNEE: Primary | ICD-10-CM

## 2023-03-13 DIAGNOSIS — M25.561 CHRONIC PAIN OF RIGHT KNEE: Primary | ICD-10-CM

## 2023-03-13 PROCEDURE — 97110 THERAPEUTIC EXERCISES: CPT | Mod: GP | Performed by: PHYSICAL THERAPIST

## 2023-03-13 PROCEDURE — 97530 THERAPEUTIC ACTIVITIES: CPT | Mod: GP | Performed by: PHYSICAL THERAPIST

## 2023-03-13 ASSESSMENT — ACTIVITIES OF DAILY LIVING (ADL)
PAIN: THE SYMPTOM AFFECTS MY ACTIVITY SLIGHTLY
SIT WITH YOUR KNEE BENT: ACTIVITY IS NOT DIFFICULT
STAND: ACTIVITY IS NOT DIFFICULT
RAW_SCORE: 58
GO DOWN STAIRS: ACTIVITY IS SOMEWHAT DIFFICULT
WALK: ACTIVITY IS NOT DIFFICULT
HOW_WOULD_YOU_RATE_THE_CURRENT_FUNCTION_OF_YOUR_KNEE_DURING_YOUR_USUAL_DAILY_ACTIVITIES_ON_A_SCALE_FROM_0_TO_100_WITH_100_BEING_YOUR_LEVEL_OF_KNEE_FUNCTION_PRIOR_TO_YOUR_INJURY_AND_0_BEING_THE_INABILITY_TO_PERFORM_ANY_OF_YOUR_USUAL_DAILY_ACTIVITIES?: 75
HOW_WOULD_YOU_RATE_THE_OVERALL_FUNCTION_OF_YOUR_KNEE_DURING_YOUR_USUAL_DAILY_ACTIVITIES?: NEARLY NORMAL
GIVING WAY, BUCKLING OR SHIFTING OF KNEE: I DO NOT HAVE THE SYMPTOM
STIFFNESS: I DO NOT HAVE THE SYMPTOM
SWELLING: I DO NOT HAVE THE SYMPTOM
KNEEL ON THE FRONT OF YOUR KNEE: ACTIVITY IS MINIMALLY DIFFICULT
LIMPING: I DO NOT HAVE THE SYMPTOM
KNEE_ACTIVITY_OF_DAILY_LIVING_SCORE: 82.86
GO UP STAIRS: ACTIVITY IS SOMEWHAT DIFFICULT
WEAKNESS: I HAVE THE SYMPTOM BUT IT DOES NOT AFFECT MY ACTIVITY
RISE FROM A CHAIR: ACTIVITY IS NOT DIFFICULT
SQUAT: ACTIVITY IS VERY DIFFICULT
AS_A_RESULT_OF_YOUR_KNEE_INJURY,_HOW_WOULD_YOU_RATE_YOUR_CURRENT_LEVEL_OF_DAILY_ACTIVITY?: NORMAL
KNEE_ACTIVITY_OF_DAILY_LIVING_SUM: 58

## 2023-04-05 DIAGNOSIS — F33.1 MAJOR DEPRESSIVE DISORDER, RECURRENT EPISODE, MODERATE (H): ICD-10-CM

## 2023-04-05 DIAGNOSIS — F41.1 GAD (GENERALIZED ANXIETY DISORDER): ICD-10-CM

## 2023-04-06 RX ORDER — BUPROPION HYDROCHLORIDE 300 MG/1
TABLET ORAL
Qty: 90 TABLET | Refills: 1 | COMMUNITY
Start: 2023-04-06

## 2023-04-06 NOTE — TELEPHONE ENCOUNTER
Coretta Huitron is requesting a refill of:    Refused Prescriptions:                       Disp   Refills    buPROPion (WELLBUTRIN XL) 300 MG 24 hr tab*90 tab*1        Sig: TAKE 1 TABLET(300 MG) BY MOUTH EVERY MORNING  Refused By: GUERITA LAWRENCE  Reason for Refusal: Patient has requested refill too soon    Refills sent on 12/05/22 for 90 with 1 refill, that is enough medication until Chen

## 2023-05-01 PROBLEM — M25.561 RIGHT KNEE PAIN: Status: RESOLVED | Noted: 2022-12-26 | Resolved: 2023-05-01

## 2023-05-01 NOTE — PROGRESS NOTES
Discharge Note    Progress reporting period is from initial evaluation date (please see noted date below) to Mar 13, 2023.  Linked Episodes   Type: Episode: Status: Noted: Resolved: Last update: Updated by:   PHYSICAL THERAPY R knee pain 12/26/22 Active 12/26/2022  3/13/2023  8:36 AM Nita Chau PT      Comments:       Please see information below for last relevant information on current status.  Patient seen for 6 visits.    SUBJECTIVE  Subjective changes noted by patient:  A little better.  Stairs are getting better.   Current pain level is 1/10.     Previous pain level was  6/10 (with stairs).   Changes in function:  Yes (See Goal flowsheet attached for changes in current functional level)  Adverse reaction to treatment or activity: None    OBJECTIVE  Changes noted in objective findings: Knee arom wnls.  Strength improving with table and WB'ing exercises.     ASSESSMENT/PLAN  Diagnosis: R knee david   Updated problem list and treatment plan:   Decreased function - HEP  Decreased strength - HEP  Impaired balance - HEP  STG/LTGs have been met or progress has been made towards goals:  Yes, please see goal flowsheet for most current information  Assessment of Progress: current status is unknown.    Last current status:     Self Management Plans:  HEP  I have re-evaluated this patient and find that the nature, scope, duration and intensity of the therapy is appropriate for the medical condition of the patient.  Coretta continues to require the following intervention to meet STG and LTG's:  HEP.    Recommendations:  Discharge with current home program.  Patient to follow up with MD as needed.

## 2023-06-06 DIAGNOSIS — F41.1 GAD (GENERALIZED ANXIETY DISORDER): ICD-10-CM

## 2023-06-06 DIAGNOSIS — F33.1 MAJOR DEPRESSIVE DISORDER, RECURRENT EPISODE, MODERATE (H): ICD-10-CM

## 2023-06-07 RX ORDER — ESCITALOPRAM OXALATE 20 MG/1
TABLET ORAL
Qty: 90 TABLET | Refills: 1 | COMMUNITY
Start: 2023-06-07

## 2023-06-07 NOTE — TELEPHONE ENCOUNTER
Coretta Huitron is requesting a refill of:    Refused Prescriptions:                       Disp   Refills    escitalopram (LEXAPRO) 20 MG tablet [Pharm*90 tab*1        Sig: TAKE 1 TABLET(20 MG) BY MOUTH DAILY  Refused By: GUERITA LAWRENCE  Reason for Refusal: Patient needs appointment    Pt last seen 12/05/22 advised to return in 6 months. Due for OV

## 2023-06-19 ENCOUNTER — OFFICE VISIT (OUTPATIENT)
Dept: FAMILY MEDICINE | Facility: CLINIC | Age: 43
End: 2023-06-19

## 2023-06-19 VITALS
TEMPERATURE: 97.9 F | SYSTOLIC BLOOD PRESSURE: 126 MMHG | DIASTOLIC BLOOD PRESSURE: 80 MMHG | OXYGEN SATURATION: 99 % | HEART RATE: 66 BPM | RESPIRATION RATE: 20 BRPM

## 2023-06-19 DIAGNOSIS — F33.1 MAJOR DEPRESSIVE DISORDER, RECURRENT EPISODE, MODERATE (H): ICD-10-CM

## 2023-06-19 DIAGNOSIS — E66.01 MORBID OBESITY, UNSPECIFIED OBESITY TYPE (H): ICD-10-CM

## 2023-06-19 DIAGNOSIS — E03.8 SUBCLINICAL HYPOTHYROIDISM: ICD-10-CM

## 2023-06-19 DIAGNOSIS — F41.1 GAD (GENERALIZED ANXIETY DISORDER): Primary | ICD-10-CM

## 2023-06-19 DIAGNOSIS — I10 BENIGN ESSENTIAL HYPERTENSION: ICD-10-CM

## 2023-06-19 DIAGNOSIS — F41.0 PANIC ATTACK: ICD-10-CM

## 2023-06-19 LAB
BUN SERPL-MCNC: 9 MG/DL (ref 7–25)
BUN/CREATININE RATIO: 11.5 (ref 6–32)
CALCIUM SERPL-MCNC: 9.2 MG/DL (ref 8.6–10.3)
CHLORIDE SERPLBLD-SCNC: 103.8 MMOL/L (ref 98–110)
CO2 SERPL-SCNC: 28.7 MMOL/L (ref 20–32)
CREAT SERPL-MCNC: 0.78 MG/DL (ref 0.6–1.3)
GLUCOSE SERPL-MCNC: 96 MG/DL (ref 60–99)
POTASSIUM SERPL-SCNC: 4.17 MMOL/L (ref 3.5–5.3)
SODIUM SERPL-SCNC: 139.1 MMOL/L (ref 135–146)

## 2023-06-19 PROCEDURE — 36415 COLL VENOUS BLD VENIPUNCTURE: CPT | Performed by: STUDENT IN AN ORGANIZED HEALTH CARE EDUCATION/TRAINING PROGRAM

## 2023-06-19 PROCEDURE — 99214 OFFICE O/P EST MOD 30 MIN: CPT | Performed by: STUDENT IN AN ORGANIZED HEALTH CARE EDUCATION/TRAINING PROGRAM

## 2023-06-19 PROCEDURE — 80048 BASIC METABOLIC PNL TOTAL CA: CPT | Performed by: STUDENT IN AN ORGANIZED HEALTH CARE EDUCATION/TRAINING PROGRAM

## 2023-06-19 RX ORDER — HYDROXYZINE PAMOATE 25 MG/1
CAPSULE ORAL
Qty: 90 CAPSULE | Refills: 5 | Status: SHIPPED | OUTPATIENT
Start: 2023-06-19 | End: 2024-01-29

## 2023-06-19 RX ORDER — BUPROPION HYDROCHLORIDE 300 MG/1
300 TABLET ORAL EVERY MORNING
Qty: 90 TABLET | Refills: 1 | Status: SHIPPED | OUTPATIENT
Start: 2023-06-19 | End: 2024-01-29

## 2023-06-19 RX ORDER — ESCITALOPRAM OXALATE 20 MG/1
20 TABLET ORAL DAILY
Qty: 90 TABLET | Refills: 1 | Status: SHIPPED | OUTPATIENT
Start: 2023-06-19 | End: 2024-01-29

## 2023-06-19 ASSESSMENT — ANXIETY QUESTIONNAIRES
2. NOT BEING ABLE TO STOP OR CONTROL WORRYING: SEVERAL DAYS
7. FEELING AFRAID AS IF SOMETHING AWFUL MIGHT HAPPEN: SEVERAL DAYS
IF YOU CHECKED OFF ANY PROBLEMS ON THIS QUESTIONNAIRE, HOW DIFFICULT HAVE THESE PROBLEMS MADE IT FOR YOU TO DO YOUR WORK, TAKE CARE OF THINGS AT HOME, OR GET ALONG WITH OTHER PEOPLE: SOMEWHAT DIFFICULT
GAD7 TOTAL SCORE: 4
3. WORRYING TOO MUCH ABOUT DIFFERENT THINGS: SEVERAL DAYS
1. FEELING NERVOUS, ANXIOUS, OR ON EDGE: SEVERAL DAYS
5. BEING SO RESTLESS THAT IT IS HARD TO SIT STILL: NOT AT ALL
GAD7 TOTAL SCORE: 4
6. BECOMING EASILY ANNOYED OR IRRITABLE: NOT AT ALL

## 2023-06-19 ASSESSMENT — PATIENT HEALTH QUESTIONNAIRE - PHQ9
5. POOR APPETITE OR OVEREATING: NOT AT ALL
SUM OF ALL RESPONSES TO PHQ QUESTIONS 1-9: 7

## 2023-06-19 NOTE — NURSING NOTE
Chief Complaint   Patient presents with     Recheck Medication     Non fasting medication check and review, wants to have thyroid checked today      Pre-visit Screening:  Immunizations:  up to date  Colonoscopy:  na  Mammogram: na  Asthma Action Test/Plan:  na  PHQ9:  Given today   GAD7:  Given today   Questioned patient about current smoking habits Pt. has never smoked.  Ok to leave detailed message on voice mail for today's visit only yes, phone # [phone]

## 2023-06-19 NOTE — PATIENT INSTRUCTIONS
Blood work today    No change to medications    Let me know how nutrition appt goes    Keep up with knee exercises     Follow-up 6 months, sooner if needed

## 2023-06-19 NOTE — PROGRESS NOTES
Assessment & Plan       ICD-10-CM    1. DANIA (generalized anxiety disorder)  F41.1 buPROPion (WELLBUTRIN XL) 300 MG 24 hr tablet     escitalopram (LEXAPRO) 20 MG tablet      2. Major depressive disorder, recurrent episode, moderate (H)  F33.1 buPROPion (WELLBUTRIN XL) 300 MG 24 hr tablet     escitalopram (LEXAPRO) 20 MG tablet      3. Panic attack  F41.0 hydrOXYzine (VISTARIL) 25 MG capsule      4. Subclinical hypothyroidism  E03.8 TSH WITH FREE T4 REFLEX ([facility])      5. Benign essential hypertension  I10 Basic Metabolic Panel (BFP)      6. Morbid obesity, unspecified obesity type (H)  E66.01          MH stable, cont current meds and therapy  Recheck thyroid, tx as indicated  BP stable, BMP today, cont ARB  Reviewed role of weight and patient's plan going forward    Patient Instructions   Blood work today    No change to medications    Let me know how nutrition appt goes    Keep up with knee exercises     Follow-up 6 months, sooner if needed       Reasons to follow-up sooner or seek emergent care reviewed.       Agustin Ramirez MD, [facility]       Subjective     Coretta Huitron is a 42 year old female who presents to clinic today for the following health issues:    HPI   Chief Complaint   Patient presents with     Recheck Medication     Non fasting medication check and review, wants to have thyroid checked today       Depression and Anxiety Follow-Up    How are you doing with your depression and anxiety since your last visit? Stable    Are you having other symptoms that might be associated with depression or anxiety? Brain fog, fam hx of thyroid disease and would like to check today    Have you had a significant life event? Work stressors     Do you have any concerns with your use of alcohol or other drugs? No  Sees therapist q2wks    Social History     Tobacco Use     Smoking status: Never     Smokeless tobacco: Never   Substance Use Topics     Alcohol use: Yes     Alcohol/week: 0.0  standard drinks of alcohol     Comment: very rare     Drug use: No         1/20/2022     9:53 AM 9/29/2022     4:06 PM 6/19/2023    10:58 AM   PHQ   PHQ-9 Total Score 11 7 7   Q9: Thoughts of better off dead/self-harm past 2 weeks Not at all Not at all Not at all         1/20/2022     9:53 AM 9/29/2022     4:06 PM 6/19/2023    10:58 AM   DANIA-7 SCORE   Total Score 12 9 4       Knees feeling better after PT, able to do stairs normally now, keeping up with HEP    Hypertension Follow-up      Do you check your blood pressure regularly outside of the clinic? Yes     Are your blood pressures ever more than 140 on the top number (systolic) OR more   than 90 on the bottom number (diastolic), for example 140/90? No  Cardiology notes reviewed, due for follow-up January 2024          Objective    /80 (BP Location: Left arm, Patient Position: Sitting, Cuff Size: Adult Large)   Pulse 66   Temp 97.9  F (36.6  C) (Temporal)   Resp 20   SpO2 99%   There is no height or weight on file to calculate BMI.  Alert, NAD, obese  NC/AT  Sclerae anicteric  Regular rate  Resp nonlabored  Skin warm and dry  No focal neuro deficits. Speech intact.   Appropriate affect, good insight  Normal gait.      Labs reviewed.

## 2023-06-20 LAB — TSH SERPL-ACNC: 2.76 MIU/L

## 2024-01-06 ENCOUNTER — HEALTH MAINTENANCE LETTER (OUTPATIENT)
Age: 44
End: 2024-01-06

## 2024-01-09 ENCOUNTER — DOCUMENTATION ONLY (OUTPATIENT)
Dept: CARDIOLOGY | Facility: CLINIC | Age: 44
End: 2024-01-09
Payer: COMMERCIAL

## 2024-01-09 DIAGNOSIS — R00.2 PALPITATIONS: ICD-10-CM

## 2024-01-09 DIAGNOSIS — I71.21 ANEURYSM OF ASCENDING AORTA WITHOUT RUPTURE (H): ICD-10-CM

## 2024-01-09 DIAGNOSIS — I10 BENIGN ESSENTIAL HYPERTENSION: ICD-10-CM

## 2024-01-09 DIAGNOSIS — I10 BENIGN ESSENTIAL HYPERTENSION: Primary | ICD-10-CM

## 2024-01-09 RX ORDER — LOSARTAN POTASSIUM 50 MG/1
50 TABLET ORAL DAILY
Qty: 30 TABLET | Refills: 0 | Status: SHIPPED | OUTPATIENT
Start: 2024-01-09 | End: 2024-01-29

## 2024-01-09 NOTE — PROGRESS NOTES
"Refill request received for patient's Losartan.     Pt has not been seen since 3/11/22 by .     Per Last OV note,   \"RECOMMENDATIONS:  1.  Palpitations, suspect mainly noncardiac  -No further evaluation     2.  Mild aortic dilation  -Repeat echo in about 2 years     3.  Hypertension  -Increase losartan to 50 mg daily     Follow-up in 1 year with DARION.\"    Placed orders for follow up. Further refills to be deferred to PCP if pt does not have follow up. HERSON Salazar    "

## 2024-01-09 NOTE — TELEPHONE ENCOUNTER
[facility] Cardiology Refill Guideline reviewed.  Medication does not meet criteria for refill due to patient is overdue for an appointment.  Messaged to providers team for further review.

## 2024-01-11 NOTE — TELEPHONE ENCOUNTER
Patient informed via [employer] that extension was sent and that they need to schedule before this runs out.

## 2024-01-22 ENCOUNTER — OFFICE VISIT (OUTPATIENT)
Dept: FAMILY MEDICINE | Facility: CLINIC | Age: 44
End: 2024-01-22

## 2024-01-22 VITALS
DIASTOLIC BLOOD PRESSURE: 92 MMHG | RESPIRATION RATE: 20 BRPM | HEART RATE: 60 BPM | TEMPERATURE: 97.2 F | SYSTOLIC BLOOD PRESSURE: 136 MMHG

## 2024-01-22 DIAGNOSIS — E66.01 MORBID OBESITY, UNSPECIFIED OBESITY TYPE (H): ICD-10-CM

## 2024-01-22 DIAGNOSIS — Z13.220 LIPID SCREENING: ICD-10-CM

## 2024-01-22 DIAGNOSIS — E55.9 VITAMIN D DEFICIENCY: ICD-10-CM

## 2024-01-22 DIAGNOSIS — Z13.1 DIABETES MELLITUS SCREENING: ICD-10-CM

## 2024-01-22 DIAGNOSIS — F41.0 PANIC ATTACK: ICD-10-CM

## 2024-01-22 DIAGNOSIS — M77.11 LATERAL EPICONDYLITIS OF RIGHT ELBOW: ICD-10-CM

## 2024-01-22 DIAGNOSIS — M25.521 RIGHT ELBOW PAIN: Primary | ICD-10-CM

## 2024-01-22 DIAGNOSIS — Z86.39 HX OF IRON DEFICIENCY: ICD-10-CM

## 2024-01-22 DIAGNOSIS — R03.0 ELEVATED BP WITHOUT DIAGNOSIS OF HYPERTENSION: ICD-10-CM

## 2024-01-22 LAB
ALBUMIN SERPL-MCNC: 4.2 G/DL (ref 3.6–5.1)
ALBUMIN/GLOB SERPL: 1.4 {RATIO} (ref 1–2.5)
ALP SERPL-CCNC: 67 U/L (ref 33–130)
ALT 1742-6: 20 U/L (ref 0–32)
AST 1920-8: 14 U/L (ref 0–35)
BILIRUB SERPL-MCNC: 0.7 MG/DL (ref 0.2–1.2)
BUN SERPL-MCNC: 12 MG/DL (ref 7–25)
BUN/CREATININE RATIO: 13.2 (ref 6–32)
CALCIUM SERPL-MCNC: 9.6 MG/DL (ref 8.6–10.3)
CHLORIDE SERPLBLD-SCNC: 102.5 MMOL/L (ref 98–110)
CHOLEST SERPL-MCNC: 170 MG/DL (ref 0–199)
CHOLEST/HDLC SERPL: 3 {RATIO} (ref 0–5)
CO2 SERPL-SCNC: 28.3 MMOL/L (ref 20–32)
CREAT SERPL-MCNC: 0.91 MG/DL (ref 0.6–1.3)
GLOBULIN, CALCULATED - QUEST: 2.9 (ref 1.9–3.7)
GLUCOSE SERPL-MCNC: 97 MG/DL (ref 60–99)
HBA1C MFR BLD: 5.3 % (ref 4–7)
HDLC SERPL-MCNC: 65 MG/DL (ref 40–150)
LDLC SERPL CALC-MCNC: 85 MG/DL (ref 0–130)
POTASSIUM SERPL-SCNC: 4.68 MMOL/L (ref 3.5–5.3)
PROT SERPL-MCNC: 7.1 G/DL (ref 6.1–8.1)
SODIUM SERPL-SCNC: 140.1 MMOL/L (ref 135–146)
TRIGL SERPL-MCNC: 98 MG/DL (ref 0–149)

## 2024-01-22 PROCEDURE — 80061 LIPID PANEL: CPT | Performed by: STUDENT IN AN ORGANIZED HEALTH CARE EDUCATION/TRAINING PROGRAM

## 2024-01-22 PROCEDURE — 73070 X-RAY EXAM OF ELBOW: CPT | Mod: RT | Performed by: STUDENT IN AN ORGANIZED HEALTH CARE EDUCATION/TRAINING PROGRAM

## 2024-01-22 PROCEDURE — 80053 COMPREHEN METABOLIC PANEL: CPT | Performed by: STUDENT IN AN ORGANIZED HEALTH CARE EDUCATION/TRAINING PROGRAM

## 2024-01-22 PROCEDURE — 36415 COLL VENOUS BLD VENIPUNCTURE: CPT | Performed by: STUDENT IN AN ORGANIZED HEALTH CARE EDUCATION/TRAINING PROGRAM

## 2024-01-22 PROCEDURE — 83036 HEMOGLOBIN GLYCOSYLATED A1C: CPT | Performed by: STUDENT IN AN ORGANIZED HEALTH CARE EDUCATION/TRAINING PROGRAM

## 2024-01-22 PROCEDURE — 99214 OFFICE O/P EST MOD 30 MIN: CPT | Performed by: STUDENT IN AN ORGANIZED HEALTH CARE EDUCATION/TRAINING PROGRAM

## 2024-01-22 RX ORDER — CLONAZEPAM 1 MG/1
1 TABLET ORAL PRN
COMMUNITY
Start: 2024-01-22

## 2024-01-22 NOTE — PATIENT INSTRUCTIONS
Blood work today    Hand therapy schedulers will call you    If not improving within 6-8 weeks please follow-up     See you next week!

## 2024-01-22 NOTE — NURSING NOTE
Coretta Huitron is here for right elbow pain for the past couple months. Pt is fasting today and wondering if she can get her blood work done today    Pt declined Wt and Ht, has CPX next week.    Questioned patient about current smoking habits.  Pt. has never smoked.  PULSE regular  My Chart: active  CLASSIFICATION OF OVERWEIGHT AND OBESITY BY BMI                        Obesity Class           BMI(kg/m2)  Underweight                                    < 18.5  Normal                                         18.5-24.9  Overweight                                     25.0-29.9  OBESITY                     I                  30.0-34.9                             II                 35.0-39.9  EXTREME OBESITY             III                >40                            Patient's  BMI There is no height or weight on file to calculate BMI.  http://hin.nhlbi.nih.gov/menuplanner/menu.cgi  Pre-visit planning  Immunizations - Will update at CPX  Colonoscopy -   Mammogram -   Asthma -   PHQ9 -    DANIA-7 -      The patient has verbalized that it is ok to leave a detailed voice message on the patient's cell phone with results/recommendations from this visit.

## 2024-01-22 NOTE — PROGRESS NOTES
ASSESSMENT & PLAN      ICD-10-CM    1. Right elbow pain  M25.521 XR Elbow Right 2 Views      2. Panic attack  F41.0 clonazePAM (KLONOPIN) 1 MG tablet      3. Elevated BP without diagnosis of hypertension  R03.0 Comprehensive Metobolic Panel (BFP)      4. Vitamin D deficiency  E55.9 VITAMIN D DEFICIENCY SCREENING ([facility])      5. Morbid obesity, unspecified obesity type (H)  E66.01 Comprehensive Metobolic Panel (BFP)     HEMOGLOBIN A1C (BFP)      6. Hx of iron deficiency  Z86.39 FERRITIN ([facility])      7. Diabetes mellitus screening  Z13.1 Comprehensive Metobolic Panel (BFP)     HEMOGLOBIN A1C (BFP)      8. Lipid screening  Z13.220 Lipid Panel (BFP)      9. Lateral epicondylitis of right elbow  M77.11 Occupational Therapy Referral           Home BP readings at goal, continue to monitor  Labs today, CPE next week    XRs obtained and reviewed with patient.   Hx and exam most c/w tennis elbow.   Reviewed options for treatment and/or further eval, agreed on plan below    Patient Instructions   Blood work today    Hand therapy schedulers will call you    If not improving within 6-8 weeks please follow-up     See you next week!      >30 minutes spent on the date of the encounter doing chart review, history and exam, documentation and further activities per the note.    Agustin Ramirez MD, [facility]      -----    SUBJECTIVE  Coretta Huitron is a/an 43 year old female who is seen for evaluation of     Chief Complaint   Patient presents with    Elbow Pain     Right, for the past 2 months    LAB REQUEST     For upcoming CPX, would like to get done today       The patient is seen by themselves.  The patient is Right handed    Date of Onset: two months   Mechanism of injury: Reports insidious onset without acute precipitating event. Increased computer time?  Location of Pain: lateral elbow  Worsened by: mornings, certain movements  Better with: unsure  Treatments tried: no treatment tried to  date  Associated symptoms: no distal numbness or tingling; denies swelling or warmth    Orthopedic/Surgical history: NO  Social History/Occupation: nurse    Patient's PMH, PSH, and family hx reviewed.      OBJECTIVE:  BP (!) 136/92 (BP Location: Right arm, Patient Position: Chair, Cuff Size: Adult Large)   Pulse 60   Temp 97.2  F (36.2  C) (Temporal)   Resp 20   LMP 01/20/2024    Alert, NAD  NC/AT  Sclerae anicteric  Regular  Resp nonlabored  Skin warm and dry  No focal neuro deficits. Speech intact.   Appropriate affect    R elbow symmetric ROM  TTP at CET, pain with resisted wrist ext and supination  CMS intact     RADIOLOGY:  XRs R elbow reviewed, see scanned Radiology report

## 2024-01-23 LAB
FERRITIN SERPL-MCNC: 33 NG/ML (ref 16–232)
VITAMIN D, 25-OH, TOTAL - QUEST: 25 NG/ML (ref 30–100)

## 2024-01-25 ENCOUNTER — TRANSFERRED RECORDS (OUTPATIENT)
Dept: FAMILY MEDICINE | Facility: CLINIC | Age: 44
End: 2024-01-25

## 2024-01-29 ENCOUNTER — OFFICE VISIT (OUTPATIENT)
Dept: FAMILY MEDICINE | Facility: CLINIC | Age: 44
End: 2024-01-29

## 2024-01-29 VITALS
HEART RATE: 77 BPM | RESPIRATION RATE: 22 BRPM | DIASTOLIC BLOOD PRESSURE: 88 MMHG | TEMPERATURE: 98.9 F | OXYGEN SATURATION: 98 % | HEIGHT: 69 IN | WEIGHT: 293 LBS | SYSTOLIC BLOOD PRESSURE: 136 MMHG | BODY MASS INDEX: 43.4 KG/M2

## 2024-01-29 DIAGNOSIS — I10 BENIGN ESSENTIAL HYPERTENSION: ICD-10-CM

## 2024-01-29 DIAGNOSIS — Z00.00 ROUTINE PHYSICAL EXAMINATION: Primary | ICD-10-CM

## 2024-01-29 DIAGNOSIS — F41.1 GAD (GENERALIZED ANXIETY DISORDER): ICD-10-CM

## 2024-01-29 DIAGNOSIS — E55.9 VITAMIN D DEFICIENCY: ICD-10-CM

## 2024-01-29 DIAGNOSIS — G47.33 OBSTRUCTIVE SLEEP APNEA SYNDROME: ICD-10-CM

## 2024-01-29 DIAGNOSIS — F41.0 PANIC ATTACK: ICD-10-CM

## 2024-01-29 DIAGNOSIS — F33.1 MAJOR DEPRESSIVE DISORDER, RECURRENT EPISODE, MODERATE (H): ICD-10-CM

## 2024-01-29 DIAGNOSIS — Z12.31 ENCOUNTER FOR SCREENING MAMMOGRAM FOR BREAST CANCER: ICD-10-CM

## 2024-01-29 PROCEDURE — 99396 PREV VISIT EST AGE 40-64: CPT | Performed by: STUDENT IN AN ORGANIZED HEALTH CARE EDUCATION/TRAINING PROGRAM

## 2024-01-29 RX ORDER — HYDROXYZINE PAMOATE 25 MG/1
CAPSULE ORAL
Qty: 90 CAPSULE | Refills: 5 | Status: SHIPPED | OUTPATIENT
Start: 2024-01-29 | End: 2024-09-16

## 2024-01-29 RX ORDER — ESCITALOPRAM OXALATE 20 MG/1
20 TABLET ORAL DAILY
Qty: 90 TABLET | Refills: 1 | Status: SHIPPED | OUTPATIENT
Start: 2024-01-29 | End: 2024-09-06

## 2024-01-29 RX ORDER — LOSARTAN POTASSIUM 50 MG/1
50 TABLET ORAL DAILY
Qty: 90 TABLET | Refills: 1 | Status: SHIPPED | OUTPATIENT
Start: 2024-01-29 | End: 2024-09-06

## 2024-01-29 RX ORDER — BUPROPION HYDROCHLORIDE 300 MG/1
300 TABLET ORAL EVERY MORNING
Qty: 90 TABLET | Refills: 1 | Status: SHIPPED | OUTPATIENT
Start: 2024-01-29 | End: 2024-03-18

## 2024-01-29 ASSESSMENT — ANXIETY QUESTIONNAIRES
7. FEELING AFRAID AS IF SOMETHING AWFUL MIGHT HAPPEN: NOT AT ALL
3. WORRYING TOO MUCH ABOUT DIFFERENT THINGS: SEVERAL DAYS
IF YOU CHECKED OFF ANY PROBLEMS ON THIS QUESTIONNAIRE, HOW DIFFICULT HAVE THESE PROBLEMS MADE IT FOR YOU TO DO YOUR WORK, TAKE CARE OF THINGS AT HOME, OR GET ALONG WITH OTHER PEOPLE: SOMEWHAT DIFFICULT
GAD7 TOTAL SCORE: 3
6. BECOMING EASILY ANNOYED OR IRRITABLE: NOT AT ALL
5. BEING SO RESTLESS THAT IT IS HARD TO SIT STILL: NOT AT ALL
1. FEELING NERVOUS, ANXIOUS, OR ON EDGE: SEVERAL DAYS
GAD7 TOTAL SCORE: 3
2. NOT BEING ABLE TO STOP OR CONTROL WORRYING: SEVERAL DAYS

## 2024-01-29 ASSESSMENT — PATIENT HEALTH QUESTIONNAIRE - PHQ9
SUM OF ALL RESPONSES TO PHQ QUESTIONS 1-9: 6
5. POOR APPETITE OR OVEREATING: NOT AT ALL

## 2024-01-29 NOTE — PATIENT INSTRUCTIONS
Increase Vit D to 10,000 units daily for 2-3 months and then we can recheck labs    No change to medications     Cardiology follow-up and Colonoscopy as planned     Can consider ozempic or mounjaro for weight loss help (insurance?)    Mammogram before August     Follow-up in 6 months, sooner if needed

## 2024-01-29 NOTE — PROGRESS NOTES
SUBJECTIVE:   CC: Coretta Huitron is an 43 year old woman who presents for preventive health visit.     Patient has been advised of split billing requirements and indicates understanding: Yes    Healthy Habits:  General health: starting new meal planning program for weight mgmt  Mental Health: therapy 2x/mo, this is hard time of year      Taking Vit D 5k units daily    Problems taking medications regularly No  Medication side effects: No  Have you had an eye exam in the past two years? yes  Do you see a dentist twice per year? yes  Do you have sleep apnea, excessive snoring or daytime drowsiness?cpap      Today's PHQ-2 Score:        No data to display              Do you feel safe in your environment? Yes      Social History     Tobacco Use    Smoking status: Never     Passive exposure: Never    Smokeless tobacco: Never   Substance Use Topics    Alcohol use: Yes     Comment: very rare-once a year     If you drink alcohol do you typically have >3 drinks per day or >7 drinks per week? No                     Reviewed orders with patient.  Reviewed health maintenance and updated orders accordingly - Yes  Lab work is in process  Labs reviewed in EPIC  BP Readings from Last 3 Encounters:   01/29/24 136/88   01/22/24 (!) 136/92   06/19/23 126/80    Wt Readings from Last 3 Encounters:   01/29/24 (!) 206.4 kg (455 lb)   12/20/22 (!) 197.8 kg (436 lb)   12/05/22 (!) 197.3 kg (435 lb)                    FHS-7:       8/8/2022     1:21 PM   Breast CA Risk Assessment (FHS-7)   Did any of your first-degree relatives have breast or ovarian cancer? No   Did any of your relatives have bilateral breast cancer? No   Did any man in your family have breast cancer? No   Did any woman in your family have breast and ovarian cancer? No   Did any woman in your family have breast cancer before age 50 y? No   Do you have 2 or more relatives with breast and/or ovarian cancer? No   Do you have 2 or more relatives with breast and/or bowel  cancer? No       Mammogram Screening - Offered annual screening and updated Health Maintenance for [place] plan based on risk factor consideration  Pertinent mammograms are reviewed under the imaging tab.    Pertinent mammograms are reviewed under the imaging tab.  History of abnormal Pap smear: No, follows with OBGYN     Reviewed and updated as needed this visit by clinical staff   Tobacco  Allergies  Meds              Reviewed and updated as needed this visit by Provider                  Past Medical History:   Diagnosis Date    Allergic state, subsequent encounter 2017    Depression     Hypertension     Morbid obesity, unspecified obesity type (H) 2017    Right foot pain     Sleep apnea     uses CPAP    Tibia/fibula fracture 2014      Past Surgical History:   Procedure Laterality Date    BUNIONECTOMY  2013    Procedure: BUNIONECTOMY;   correct of bunion and bunionette deformation right with oteotomies;  Surgeon: Tanesha Rosa DPM;  Location: [facility] OR    HC CORRECT BUNION,SIMPLE  2004    fusion metatarsal    HC EXCIS PRIMARY GANGLION WRIST  ,     left     HC EXCIS RECURRENT GANGLION WRIST   ,     right    HC REMOVE TONSILS/ADENOIDS,<13 Y/O      age 8    LAPAROSCOPIC CHOLECYSTECTOMY N/A 2015    Procedure: LAPAROSCOPIC CHOLECYSTECTOMY;  Surgeon: Alyson Horne MD;  Location: [facility] OR     Family History   Problem Relation Age of Onset    Obesity Mother     Thyroid Disease Mother     Obesity Father     Heart Failure Father     Hypertension Father     Coronary Artery Disease Father 65        MI -  in     Sleep Apnea Father     Thyroid Disease Maternal Grandmother     Prostate Cancer Maternal Grandfather     Cancer Paternal Grandfather     Colon Cancer Paternal Grandfather     Diabetes No family hx of         ROS:  12 point ROS performed and negative for new concerns except as mentioned above     OBJECTIVE:   /88 (BP Location: Left arm, Patient  "Position: Sitting, Cuff Size: Adult Large)   Pulse 77   Temp 98.9  F (37.2  C) (Temporal)   Resp 22   Ht 1.74 m (5' 8.5\")   Wt (!) 206.4 kg (455 lb)   LMP 01/20/2024   SpO2 98%   BMI 68.18 kg/m    EXAM:  GENERAL: alert and no distress  EYES: Eyes grossly normal to inspection, PERRL and conjunctivae and sclerae normal  HENT: ear canals and TM's normal, nose and mouth without ulcers or lesions  NECK: no adenopathy, no asymmetry, masses, or scars  RESP: lungs clear to auscultation - no rales, rhonchi or wheezes  CV: regular rate and rhythm, normal S1 S2, no S3 or S4, no murmur, click or rub, no peripheral edema  ABDOMEN: soft, nontender, no hepatosplenomegaly, no masses and bowel sounds normal  MS: no gross musculoskeletal defects noted, no edema  SKIN: no suspicious lesions or rashes  NEURO: Normal strength and tone, mentation intact and speech normal  PSYCH: mentation appears normal, affect normal/bright  LYMPH: no cervical, supraclavicular adenopathy    Diagnostic Test Results:  Labs reviewed in Epic    ASSESSMENT/PLAN:       ICD-10-CM    1. Routine physical examination  Z00.00       2. DANIA (generalized anxiety disorder)  F41.1 buPROPion (WELLBUTRIN XL) 300 MG 24 hr tablet     escitalopram (LEXAPRO) 20 MG tablet      3. Major depressive disorder, recurrent episode, moderate (H)  F33.1 buPROPion (WELLBUTRIN XL) 300 MG 24 hr tablet     escitalopram (LEXAPRO) 20 MG tablet      4. Panic attack  F41.0 hydrOXYzine ximena (VISTARIL) 25 MG capsule      5. Benign essential hypertension  I10 losartan (COZAAR) 50 MG tablet      6. Encounter for screening mammogram for breast cancer  Z12.31 MA Screening Bilateral w/ Rojas     Radiology Referral      7. Vitamin D deficiency  E55.9 VITAMIN D DEFICIENCY SCREENING ([facility])      8. Obstructive sleep apnea syndrome  G47.33           Patient has been advised of split billing requirements and indicates understanding: Yes  COUNSELING:   Reviewed preventive health counseling, as " "reflected in patient instructions       Regular exercise       Healthy diet/nutrition       Vision screening       Immunizations       Alcohol Use       Osteoporosis prevention/bone health       Colorectal Cancer Screening       Advance Care Planning    Estimated body mass index is 68.18 kg/m  as calculated from the following:    Height as of this encounter: 1.74 m (5' 8.5\").    Weight as of this encounter: 206.4 kg (455 lb).    Weight management plan: Discussed healthy diet and exercise guidelines and discussed medication options. Pt averse to weight loss surgery.    She reports that she has never smoked. She has never been exposed to tobacco smoke. She has never used smokeless tobacco.    Patient Instructions   Increase Vit D to 10,000 units daily for 2-3 months and then we can recheck labs    No change to medications     Cardiology follow-up and Colonoscopy as planned     Can consider ozempic or mounjaro for weight loss help (insurance?)    Mammogram before August     Follow-up in 6 months, sooner if needed      Agustin Ramirez MD, [facility]     "

## 2024-01-29 NOTE — NURSING NOTE
Chief Complaint   Patient presents with    Physical     Annual, non fasting, had labs done last week when seen, no pap due     Recheck Medication     Needs refills of medications today      Pre-visit Screening:  Immunizations:  up to date  Colonoscopy:  na  Mammogram: is due and ordered today-last one done was 2 years ago   Asthma Action Test/Plan:  na  PHQ9:  given today   GAD7:  given today   Questioned patient about current smoking habits Pt. has never smoked.  Ok to leave detailed message on voice mail for today's visit only yes, phone # [phone] (home) [phone] (work)

## 2024-02-05 ENCOUNTER — THERAPY VISIT (OUTPATIENT)
Dept: OCCUPATIONAL THERAPY | Facility: CLINIC | Age: 44
End: 2024-02-05
Attending: STUDENT IN AN ORGANIZED HEALTH CARE EDUCATION/TRAINING PROGRAM
Payer: COMMERCIAL

## 2024-02-05 DIAGNOSIS — M77.11 LATERAL EPICONDYLITIS OF RIGHT ELBOW: Primary | ICD-10-CM

## 2024-02-05 PROCEDURE — 97165 OT EVAL LOW COMPLEX 30 MIN: CPT | Mod: GO | Performed by: OCCUPATIONAL THERAPIST

## 2024-02-05 PROCEDURE — 97112 NEUROMUSCULAR REEDUCATION: CPT | Mod: GO | Performed by: OCCUPATIONAL THERAPIST

## 2024-02-05 PROCEDURE — 97530 THERAPEUTIC ACTIVITIES: CPT | Mod: GO | Performed by: OCCUPATIONAL THERAPIST

## 2024-02-05 NOTE — PROGRESS NOTES
OCCUPATIONAL THERAPY EVALUATION  Type of Visit: Evaluation    See electronic medical record for Abuse and Falls Screening details.    Subjective      Presenting condition or subjective complaint: R elbow pain  Date of onset: 01/22/24 (order date)    Relevant medical history: Depression; High blood pressure; Overweight; Sleep disorder like apnea   Past Medical History:   Diagnosis Date    Allergic state, subsequent encounter 7/12/2017    Depression     Hypertension     Morbid obesity, unspecified obesity type (H) 7/12/2017    Right foot pain     Sleep apnea     uses CPAP    Tibia/fibula fracture 4/23/2014   Dates & types of surgery:    Past Surgical History:   Procedure Laterality Date    BUNIONECTOMY  12/5/2013    Procedure: BUNIONECTOMY;   correct of bunion and bunionette deformation right with oteotomies;  Surgeon: Tanesha Rosa DPM;  Location: [facility]    HC CORRECT BUNION,SIMPLE  12/2004    fusion metatarsal    HC EXCIS PRIMARY GANGLION WRIST  1994, 1995    left     HC EXCIS RECURRENT GANGLION WRIST   2009, 2012    right    HC REMOVE TONSILS/ADENOIDS,<13 Y/O      age 8    LAPAROSCOPIC CHOLECYSTECTOMY N/A 11/4/2015    Procedure: LAPAROSCOPIC CHOLECYSTECTOMY;  Surgeon: Alyson Horne MD;  Location: [facility] OR     Prior diagnostic imaging/testing results: X-ray     Prior therapy history for the same diagnosis, illness or injury: No      Prior Level of Function  Transfers:   Ambulation:   ADL:   IADL:     Living Environment  Social support: Alone   Type of home: House   Stairs to enter the home: Yes 3 Is there a railing: Yes   Ramp: No   Stairs inside the home: Yes 20     Help at home: None  Equipment owned:       Employment: Yes Nursing professor  Hobbies/Interests: arts and crafts    Patient goals for therapy: Everything, weaker and more pain    Pain assessment:      Objective   Right hand dominant  Patient reports symptoms of pain and weakness/loss of strength    Pain Level (Scale 0-10)   2/5/2024   At  Rest 6/10   With Use 7/10     Pain Description  Date 2/5/2024   Location elbow   Pain Quality Gnawing, Sharp, Stabbing, Tender, Tiring, and Deep/Achy   Frequency intermittent or constant     Pain is worst  daytime   Exacerbated by  Positioning, computer work   Relieved by Rest, sometimes ibuprofen   Progression Gradually worsening     Posture  Forward Neck Posture and Rounded Forward Shoulders    Sensation  WNL throughout all nerve distributions; per patient report    ROM  Pain Report: - none  + mild    ++ moderate    +++ severe   Elbow 2/5/2024 2/5/2024   AROM (PROM) L R   Extension 6 28 +   Flexion 148 148   Supination 74 58 +   Pronation 86 88 -     Wrist 2/5/2024 2/5/2024   AROM (PROM) L R   Extension 71 66   Flexion 69 66   RD 29 31    UD 58 55 -     Resisted Testing  Pain Report:  - none    + mild    ++ moderate    +++ severe    2/5/2024   Elbow Extension 4+/5    Elbow Flexion 4+/5 +   Supination  4-/5 ++   Pronation 4/5 -   Wrist Ext with RD, Elbow at side 4/5 +   Wrist Ext with UD, Elbow at side 4/5 +   Wrist Ext with RD, Elbow Ext 4+/5 ++   Wrist Ext with UD, Elbow Ext 4+/5 ++   Wrist Flex with RD, Elbow at side 5/5 -   Wrist Flex with UD, Elbow at side 5/5 -   Wrist Flex with RD, Elbow Ext 5/5 -   Wrist Flex with UD, Elbow Ext 5/5 -   EDC with Elbow at side 5/5 ++   EDC with Elbow Ext Pain with AROM   Long Finger Test 4-/5 +     Strength   (Measured in pounds)  Pain Report: - none  + mild    ++ moderate    +++ severe    2/5/2024 2/5/2024   Trials L R   1 108 101 +   Average 108 101 +       2/5/2024 2/5/2024    L R   Elbow Ext NT NT     Palpation  Pain Report:  - none    + mild    ++ moderate    +++ severe    2/5/2024   Distal Triceps ++   Anconeus ++   ECRB at LEP ++   ECU at LEP ++   EDC at LEP ++   Radial Head ++   Extensor Wad ++   PIN Site ++   Flexor Wad +     Assessment & Plan   CLINICAL IMPRESSIONS  Medical Diagnosis: Lateral epicondylitis of right elbow    Treatment Diagnosis: Right elbow  pain    Impression/Assessment: Pt is a 43 year old female presenting to Occupational Therapy due to overuse, computer project.  The following significant findings have been identified: Impaired ROM, Impaired sensation, Impaired strength, and Pain.  These identified deficits interfere with their ability to perform self care tasks, work tasks, recreational activities, household chores, driving , and meal planning and preparation as compared to previous level of function.     Clinical Decision Making (Complexity):  Assessment of Occupational Performance: 5 or more Performance Deficits  Occupational Performance Limitations: bathing/showering, toileting, dressing, feeding, functional mobility, hygiene and grooming, driving and community mobility, health management and maintenance, home establishment and management, meal preparation and cleanup, shopping, sleep, work, and leisure activities  Clinical Decision Making (Complexity): Low complexity    PLAN OF CARE  Treatment Interventions:  Modalities:  US and Paraffin  Therapeutic Exercise:  AROM, AAROM, PROM, Tendon Gliding, Blocking, Reverse Blocking, Place and Hold, Contract Relax, Extensor Tracking, Isotonics, Isometrics and Stabilization  Neuromuscular re-education:  Nerve Gliding, Coordination/Dexterity, Sensory re-education, Desensitization, Kinesthetic Training, Proprioceptive Training, Kinesiotaping, Strain Counter Strain, Isometrics, Stabilization   Manual Techniques:  Coordination/Dexterity, Joint mobilization, Scar mobilization, Friction massage, Myofascial release, and Manual edema mobilization  Orthotic Fabrication:  Static, Finger based, and Forearm based  Self Care:  Self Care Tasks, Ergonomic Considerations, and Work Tasks    Long Term Goals   OT Goal 1  Goal Identifier: Household IADLs - gripping  Goal Description: Open a tight or new jar with mild to no difficulty  Rationale: In order to maximize safety and independence with performance of self-care  activities  Target Date: 04/01/24      Frequency of Treatment: 1x/week  Duration of Treatment: 4 weeks, tapering to biweekly for 4 weeks     Recommended Referrals to Other Professionals:   Education Assessment: Learner/Method: Patient;Pictures/Video  Education Comments: PTRX on phone     Risks and benefits of evaluation/treatment have been explained.   Patient/Family/caregiver agrees with Plan of Care.     Evaluation Time:    OT Kasandra, Low Complexity Minutes (47635): 26       Signing Clinician: Viri Zaragoza OT

## 2024-02-12 ENCOUNTER — THERAPY VISIT (OUTPATIENT)
Dept: OCCUPATIONAL THERAPY | Facility: CLINIC | Age: 44
End: 2024-02-12
Attending: STUDENT IN AN ORGANIZED HEALTH CARE EDUCATION/TRAINING PROGRAM
Payer: COMMERCIAL

## 2024-02-12 DIAGNOSIS — M77.11 LATERAL EPICONDYLITIS OF RIGHT ELBOW: Primary | ICD-10-CM

## 2024-02-12 PROCEDURE — 97035 APP MDLTY 1+ULTRASOUND EA 15: CPT | Mod: GO | Performed by: OCCUPATIONAL THERAPIST

## 2024-02-12 PROCEDURE — 97760 ORTHOTIC MGMT&TRAING 1ST ENC: CPT | Mod: GO | Performed by: OCCUPATIONAL THERAPIST

## 2024-02-12 PROCEDURE — 97112 NEUROMUSCULAR REEDUCATION: CPT | Mod: GO | Performed by: OCCUPATIONAL THERAPIST

## 2024-02-28 DIAGNOSIS — F33.1 MAJOR DEPRESSIVE DISORDER, RECURRENT EPISODE, MODERATE (H): ICD-10-CM

## 2024-02-28 DIAGNOSIS — F41.1 GAD (GENERALIZED ANXIETY DISORDER): ICD-10-CM

## 2024-02-29 RX ORDER — BUPROPION HYDROCHLORIDE 300 MG/1
300 TABLET ORAL EVERY MORNING
COMMUNITY
Start: 2024-02-29

## 2024-02-29 NOTE — TELEPHONE ENCOUNTER
Coretta Huitron is requesting a refill of:    Refused Prescriptions:                       Disp   Refills    buPROPion (WELLBUTRIN XL) 300 MG 24 hr tab*                Sig: TAKE 1 TABLET(300 MG) BY MOUTH EVERY MORNING  Refused By: GUERITA LAWRENCE  Reason for Refusal: Should already have refills on file  Reason for Refusal Comment: Refills sent on 01/29/24 for 90 tab with 1 refill

## 2024-03-04 ENCOUNTER — THERAPY VISIT (OUTPATIENT)
Dept: OCCUPATIONAL THERAPY | Facility: CLINIC | Age: 44
End: 2024-03-04
Payer: COMMERCIAL

## 2024-03-04 DIAGNOSIS — M77.11 LATERAL EPICONDYLITIS OF RIGHT ELBOW: Primary | ICD-10-CM

## 2024-03-04 PROCEDURE — 97035 APP MDLTY 1+ULTRASOUND EA 15: CPT | Mod: GO | Performed by: OCCUPATIONAL THERAPIST

## 2024-03-04 PROCEDURE — 97112 NEUROMUSCULAR REEDUCATION: CPT | Mod: GO | Performed by: OCCUPATIONAL THERAPIST

## 2024-03-04 PROCEDURE — 97530 THERAPEUTIC ACTIVITIES: CPT | Mod: GO | Performed by: OCCUPATIONAL THERAPIST

## 2024-03-11 ENCOUNTER — THERAPY VISIT (OUTPATIENT)
Dept: OCCUPATIONAL THERAPY | Facility: CLINIC | Age: 44
End: 2024-03-11
Payer: COMMERCIAL

## 2024-03-11 DIAGNOSIS — M77.11 LATERAL EPICONDYLITIS OF RIGHT ELBOW: Primary | ICD-10-CM

## 2024-03-11 PROCEDURE — 97035 APP MDLTY 1+ULTRASOUND EA 15: CPT | Mod: GO | Performed by: OCCUPATIONAL THERAPIST

## 2024-03-11 PROCEDURE — 97112 NEUROMUSCULAR REEDUCATION: CPT | Mod: GO | Performed by: OCCUPATIONAL THERAPIST

## 2024-03-18 ENCOUNTER — OFFICE VISIT (OUTPATIENT)
Dept: FAMILY MEDICINE | Facility: CLINIC | Age: 44
End: 2024-03-18

## 2024-03-18 ENCOUNTER — THERAPY VISIT (OUTPATIENT)
Dept: OCCUPATIONAL THERAPY | Facility: CLINIC | Age: 44
End: 2024-03-18
Payer: COMMERCIAL

## 2024-03-18 VITALS
OXYGEN SATURATION: 98 % | HEART RATE: 71 BPM | BODY MASS INDEX: 43.4 KG/M2 | TEMPERATURE: 98 F | HEIGHT: 69 IN | SYSTOLIC BLOOD PRESSURE: 124 MMHG | DIASTOLIC BLOOD PRESSURE: 86 MMHG | WEIGHT: 293 LBS | RESPIRATION RATE: 20 BRPM

## 2024-03-18 DIAGNOSIS — M77.11 LATERAL EPICONDYLITIS OF RIGHT ELBOW: Primary | ICD-10-CM

## 2024-03-18 DIAGNOSIS — E66.01 MORBID OBESITY, UNSPECIFIED OBESITY TYPE (H): ICD-10-CM

## 2024-03-18 DIAGNOSIS — Z83.719 FAMILY HX COLONIC POLYPS: ICD-10-CM

## 2024-03-18 DIAGNOSIS — I10 BENIGN ESSENTIAL HYPERTENSION: ICD-10-CM

## 2024-03-18 DIAGNOSIS — Z01.818 PRE-OPERATIVE GENERAL PHYSICAL EXAMINATION: Primary | ICD-10-CM

## 2024-03-18 DIAGNOSIS — G47.33 OBSTRUCTIVE SLEEP APNEA SYNDROME: ICD-10-CM

## 2024-03-18 PROCEDURE — 97112 NEUROMUSCULAR REEDUCATION: CPT | Mod: GO | Performed by: OCCUPATIONAL THERAPIST

## 2024-03-18 PROCEDURE — 97035 APP MDLTY 1+ULTRASOUND EA 15: CPT | Mod: GO | Performed by: OCCUPATIONAL THERAPIST

## 2024-03-18 PROCEDURE — 99214 OFFICE O/P EST MOD 30 MIN: CPT | Performed by: STUDENT IN AN ORGANIZED HEALTH CARE EDUCATION/TRAINING PROGRAM

## 2024-03-18 RX ORDER — BUPROPION HYDROCHLORIDE 300 MG/1
300 TABLET ORAL EVERY MORNING
COMMUNITY
End: 2024-09-06

## 2024-03-18 NOTE — PROGRESS NOTES
Preoperative Evaluation  [facility]  [address] [place] [address]  [address]  [facility] [address]  Phone: [phone]  Fax: [phone]  Primary Provider: Mita Snow  Pre-op Performing Provider: MITA SNOW  Mar 18, 2024       Doreen is a 43 year old, presenting for the following:  Pre-Op Exam (DOS 4/1, colonoscopy being done at [facility], being done by Dr. Crain)      Surgical Information  Surgery/Procedure: Colonoscopy   Surgery Location: [facility]   Surgeon: Dr. Crain  Surgery Date: 4/1/24  Time of Surgery: 9:00 am  Where patient plans to recover: At home with family  Fax number for surgical facility: Note does not need to be faxed, will be available electronically in Epic.    Assessment & Plan     The proposed surgical procedure is considered LOW risk.      ICD-10-CM    1. Pre-operative general physical examination  Z01.818       2. Family hx colonic polyps  Z83.719       3. Obstructive sleep apnea syndrome  G47.33       4. Morbid obesity, unspecified obesity type (H)  E66.01       5. Benign essential hypertension  I10               - No identified additional risk factors other than previously addressed    Antiplatelet or Anticoagulation Medication Instructions   - Patient is on no antiplatelet or anticoagulation medications.    Additional Medication Instructions  Patient is to take all scheduled medications on the day of surgery    Recommendation  APPROVAL GIVEN to proceed with proposed procedure, without further diagnostic evaluation.    Patient Instructions   No vitamins or NSAIDs for one week prior to procedure    Take prescriptions normally    Due for routine Cardiology follow-up:   [facility] [phone]      Mita Snow MD, CAQSM  [facility]     Subjective       HPI related to upcoming procedure: colonoscopy due to family hx    1. No - Have you ever had a heart attack or  stroke?  2. No - Have you ever had surgery on your heart or blood vessels, such as a stent, coronary (heart) bypass, or surgery on an artery in the head, neck, heart, or legs?  3. No - Do you have chest pain when you are physically active?  4. No - Do you have a history of heart failure?  5. No - Do you currently have a cold, bronchitis, or symptoms of other respiratory (head and chest) infections?  6. No - Do you have a cough, shortness of breath, or wheezing?  7. No - Do you or anyone in your family have a history of blood clots?  8. No - Do you or anyone in your family have a serious bleeding problem, such as long-lasting bleeding after surgeries or cuts?  9. Yes - Have you ever had anemia or been told to take iron pills? Remote hx of low ferritin/Fe replacement, no recent concerns  10. No - Have you had any abnormal blood loss such as black, tarry or bloody stools, or abnormal vaginal bleeding?  11. No - Have you ever had a blood transfusion?  12. Yes - Are you willing to have a blood transfusion if it is medically needed before, during, or after your surgery?  13. No - Have you or anyone in your family ever had problems with anesthesia (sedation for surgery)?  14. Yes - Do you have sleep apnea, excessive snoring, or daytime drowsiness? Known GENESIS  15. No - Do you have any artifical heart valves or other implanted medical devices, such as a pacemaker, defibrillator, or continuous glucose monitor?  16. No - Do you have any artifical joints?  17. No - Are you allergic to latex?  18. No - Is there any chance that you may be pregnant?    Health Care Directive  Patient does not have a Health Care Directive or Living Will: Discussed advance care planning with patient; however, patient declined at this time.      Status of Chronic Conditions:  See problem list for active medical problems.  Problems all longstanding and stable, except as noted/documented.  See ROS for pertinent symptoms related to these  conditions.    Patient Active Problem List    Diagnosis Date Noted    Lateral epicondylitis of right elbow 02/05/2024     Priority: Medium    Panic attack 09/30/2022     Priority: Medium    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 09/30/2022     Priority: Medium    Thoracic ascending aortic aneurysm 09/30/2022     Priority: Medium    DANIA (generalized anxiety disorder) 01/18/2022     Priority: Medium    PCOS (polycystic ovarian syndrome) - per [facility] 07/23/2017     Priority: Medium    Allergic state, subsequent encounter 07/12/2017     Priority: Medium     IMO Regulatory Load OCT 2020      Morbid obesity, unspecified obesity type (H) 07/12/2017     Priority: Medium    Major depressive disorder, recurrent episode, moderate (H) 05/17/2016     Priority: Medium    Knee pain 04/23/2014     Priority: Medium    Obstructive sleep apnea syndrome 11/21/2013     Priority: Medium    Vitamin D deficiency 11/21/2013     Priority: Medium    Tooth decay/ multiple cavities 2013 11/21/2013     Priority: Medium    Benign essential hypertension 09/17/2008     Priority: Medium    Irritable bowel syndrome      Priority: Medium    ACP (advance care planning) 12/19/2012     Priority: Low     Advance Care Planning 5/17/2016: ACP Review of Chart / Resources Provided:  Reviewed chart for advance care plan.  Coretta Huitron has no plan or code status on file. Discussed available resources and provided with information. Confirmed code status reflects current choices pending further ACP discussions.  Confirmed/documented legally designated decision makers.  Added by Alondra Hair              [facility] 12/19/2012     Priority: Low     State Tier Level:  Tier 2  Status:  na  Care Coordinator:   See Letters for [facility] Care Plan            Past Medical History:   Diagnosis Date    Allergic state, subsequent encounter 7/12/2017    Depression     Hypertension     Morbid obesity, unspecified obesity type (H) 7/12/2017    Right foot pain      Sleep apnea     uses CPAP    Tibia/fibula fracture 2014     Past Surgical History:   Procedure Laterality Date    BUNIONECTOMY  2013    Procedure: BUNIONECTOMY;   correct of bunion and bunionette deformation right with oteotomies;  Surgeon: Tanesha Rosa DPM;  Location: [facility] OR    HC CORRECT BUNION,SIMPLE  2004    fusion metatarsal    HC EXCIS PRIMARY GANGLION WRIST  ,     left     HC EXCIS RECURRENT GANGLION WRIST   ,     right    HC REMOVE TONSILS/ADENOIDS,<13 Y/O      age 8    LAPAROSCOPIC CHOLECYSTECTOMY N/A 2015    Procedure: LAPAROSCOPIC CHOLECYSTECTOMY;  Surgeon: Alyson Horne MD;  Location: [facility] OR     Current Outpatient Medications   Medication Sig Dispense Refill    buPROPion (WELLBUTRIN XL) 300 MG 24 hr tablet Take 300 mg by mouth every morning      cholecalciferol (VITAMIN D3) 5000 UNITS CAPS capsule Take by mouth daily      clonazePAM (KLONOPIN) 1 MG tablet Take 1 tablet (1 mg) by mouth as needed for anxiety (Uses very rare) Take one as needed for anxiety      escitalopram (LEXAPRO) 20 MG tablet Take 1 tablet (20 mg) by mouth daily 90 tablet 1    fexofenadine (ALLEGRA) 180 MG tablet Take 180 mg by mouth daily      hydrOXYzine ximena (VISTARIL) 25 MG capsule Take 1-2 tablets up to 3 x a day for anxiety 90 capsule 5    losartan (COZAAR) 50 MG tablet Take 1 tablet (50 mg) by mouth daily 90 tablet 1       Allergies   Allergen Reactions    No Known Drug Allergy         Social History     Tobacco Use    Smoking status: Never     Passive exposure: Never    Smokeless tobacco: Never   Substance Use Topics    Alcohol use: Yes     Comment: very rare-once a year     Family History   Problem Relation Age of Onset    Obesity Mother     Thyroid Disease Mother     Obesity Father     Heart Failure Father     Hypertension Father     Coronary Artery Disease Father 65        MI -  in     Sleep Apnea Father     Thyroid Disease Maternal Grandmother     Prostate Cancer  "Maternal Grandfather     Cancer Paternal Grandfather     Colon Cancer Paternal Grandfather     Diabetes No family hx of      History   Drug Use No         Review of Systems  12 point ROS performed and negative for new concerns except as mentioned above      Objective    /86 (BP Location: Right arm, Patient Position: Sitting, Cuff Size: Adult Large)   Pulse 71   Temp 98  F (36.7  C) (Temporal)   Resp 20   Ht 1.74 m (5' 8.5\")   Wt (!) 204.6 kg (451 lb)   LMP 01/20/2024   SpO2 98%   BMI 67.58 kg/m     Estimated body mass index is 67.58 kg/m  as calculated from the following:    Height as of this encounter: 1.74 m (5' 8.5\").    Weight as of this encounter: 204.6 kg (451 lb).  Physical Exam  GENERAL: alert and no distress  EYES: Eyes grossly normal to inspection, PERRL and conjunctivae and sclerae normal  NECK: no adenopathy, no asymmetry, masses, or scars  RESP: lungs clear to auscultation - no rales, rhonchi or wheezes  CV: regular rate and rhythm, normal S1 S2, no S3 or S4, no murmur, click or rub, no peripheral edema  MS: no gross musculoskeletal defects noted, no edema  NEURO: Normal strength and tone, mentation intact and speech normal  PSYCH: mentation appears normal, affect normal/bright    Recent Labs   Lab Test 01/22/24  1146 01/22/24  0000 06/19/23  0000   NA  --  140.1 139.1   POTASSIUM  --  4.68 4.17   CR  --  0.91 0.78   A1C 5.3  --   --         Diagnostics  No labs were ordered during this visit.   No EKG required for low risk surgery (cataract, skin procedure, breast biopsy, etc).    Revised Cardiac Risk Index (RCRI)  The patient has the following serious cardiovascular risks for perioperative complications:   - No serious cardiac risks = 0 points     RCRI Interpretation: 0 points: Class I (very low risk - 0.4% complication rate)         Signed Electronically by: MITA SNOW MD  Copy of this evaluation report is provided to requesting physician.       "

## 2024-03-18 NOTE — NURSING NOTE
Chief Complaint   Patient presents with    Pre-Op Exam     DOS 4/1, colonoscopy being done at [facility], being done by Dr. Crain

## 2024-04-01 ENCOUNTER — ANESTHESIA (OUTPATIENT)
Dept: GASTROENTEROLOGY | Facility: CLINIC | Age: 44
End: 2024-04-01
Payer: COMMERCIAL

## 2024-04-01 ENCOUNTER — ANESTHESIA EVENT (OUTPATIENT)
Dept: GASTROENTEROLOGY | Facility: CLINIC | Age: 44
End: 2024-04-01
Payer: COMMERCIAL

## 2024-04-01 ENCOUNTER — HOSPITAL ENCOUNTER (OUTPATIENT)
Facility: CLINIC | Age: 44
Discharge: HOME | End: 2024-04-01
Attending: INTERNAL MEDICINE | Admitting: INTERNAL MEDICINE
Payer: COMMERCIAL

## 2024-04-01 VITALS
SYSTOLIC BLOOD PRESSURE: 117 MMHG | BODY MASS INDEX: 67.58 KG/M2 | HEART RATE: 57 BPM | WEIGHT: 293 LBS | OXYGEN SATURATION: 97 % | DIASTOLIC BLOOD PRESSURE: 73 MMHG

## 2024-04-01 LAB — COLONOSCOPY: NORMAL

## 2024-04-01 PROCEDURE — 45378 DIAGNOSTIC COLONOSCOPY: CPT | Performed by: ANESTHESIOLOGY

## 2024-04-01 PROCEDURE — 370N000017 HC ANESTHESIA TECHNICAL FEE, PER MIN: Performed by: INTERNAL MEDICINE

## 2024-04-01 PROCEDURE — 45378 DIAGNOSTIC COLONOSCOPY: CPT | Performed by: INTERNAL MEDICINE

## 2024-04-01 PROCEDURE — 250N000011 HC RX IP 250 OP 636

## 2024-04-01 PROCEDURE — 258N000003 HC RX IP 258 OP 636

## 2024-04-01 PROCEDURE — 45378 DIAGNOSTIC COLONOSCOPY: CPT

## 2024-04-01 PROCEDURE — G0121 COLON CA SCRN NOT HI RSK IND: HCPCS | Performed by: INTERNAL MEDICINE

## 2024-04-01 PROCEDURE — 999N000010 HC STATISTIC ANES STAT CODE-CRNA PER MINUTE: Performed by: INTERNAL MEDICINE

## 2024-04-01 RX ORDER — NALOXONE HYDROCHLORIDE 0.4 MG/ML
0.1 INJECTION, SOLUTION INTRAMUSCULAR; INTRAVENOUS; SUBCUTANEOUS
Status: DISCONTINUED | OUTPATIENT
Start: 2024-04-01 | End: 2024-04-01 | Stop reason: HOSPADM

## 2024-04-01 RX ORDER — ONDANSETRON 2 MG/ML
INJECTION INTRAMUSCULAR; INTRAVENOUS PRN
Status: DISCONTINUED | OUTPATIENT
Start: 2024-04-01 | End: 2024-04-01

## 2024-04-01 RX ORDER — ONDANSETRON 4 MG/1
4 TABLET, ORALLY DISINTEGRATING ORAL EVERY 30 MIN PRN
Status: DISCONTINUED | OUTPATIENT
Start: 2024-04-01 | End: 2024-04-01 | Stop reason: HOSPADM

## 2024-04-01 RX ORDER — FENTANYL CITRATE 50 UG/ML
25 INJECTION, SOLUTION INTRAMUSCULAR; INTRAVENOUS EVERY 5 MIN PRN
Status: DISCONTINUED | OUTPATIENT
Start: 2024-04-01 | End: 2024-04-01 | Stop reason: HOSPADM

## 2024-04-01 RX ORDER — ONDANSETRON 2 MG/ML
4 INJECTION INTRAMUSCULAR; INTRAVENOUS EVERY 30 MIN PRN
Status: DISCONTINUED | OUTPATIENT
Start: 2024-04-01 | End: 2024-04-01 | Stop reason: HOSPADM

## 2024-04-01 RX ORDER — HYDROMORPHONE HCL IN WATER/PF 6 MG/30 ML
0.4 PATIENT CONTROLLED ANALGESIA SYRINGE INTRAVENOUS EVERY 5 MIN PRN
Status: DISCONTINUED | OUTPATIENT
Start: 2024-04-01 | End: 2024-04-01 | Stop reason: HOSPADM

## 2024-04-01 RX ORDER — SODIUM CHLORIDE, SODIUM LACTATE, POTASSIUM CHLORIDE, CALCIUM CHLORIDE 600; 310; 30; 20 MG/100ML; MG/100ML; MG/100ML; MG/100ML
INJECTION, SOLUTION INTRAVENOUS CONTINUOUS
Status: DISCONTINUED | OUTPATIENT
Start: 2024-04-01 | End: 2024-04-01 | Stop reason: HOSPADM

## 2024-04-01 RX ORDER — FENTANYL CITRATE 50 UG/ML
50 INJECTION, SOLUTION INTRAMUSCULAR; INTRAVENOUS EVERY 5 MIN PRN
Status: DISCONTINUED | OUTPATIENT
Start: 2024-04-01 | End: 2024-04-01 | Stop reason: HOSPADM

## 2024-04-01 RX ORDER — FLUTICASONE PROPIONATE 50 MCG
1 SPRAY, SUSPENSION (ML) NASAL DAILY
COMMUNITY

## 2024-04-01 RX ORDER — PROPOFOL 10 MG/ML
INJECTION, EMULSION INTRAVENOUS CONTINUOUS PRN
Status: DISCONTINUED | OUTPATIENT
Start: 2024-04-01 | End: 2024-04-01

## 2024-04-01 RX ORDER — PROPOFOL 10 MG/ML
INJECTION, EMULSION INTRAVENOUS PRN
Status: DISCONTINUED | OUTPATIENT
Start: 2024-04-01 | End: 2024-04-01

## 2024-04-01 RX ORDER — SODIUM CHLORIDE, SODIUM LACTATE, POTASSIUM CHLORIDE, CALCIUM CHLORIDE 600; 310; 30; 20 MG/100ML; MG/100ML; MG/100ML; MG/100ML
INJECTION, SOLUTION INTRAVENOUS CONTINUOUS PRN
Status: DISCONTINUED | OUTPATIENT
Start: 2024-04-01 | End: 2024-04-01

## 2024-04-01 RX ORDER — HYDROMORPHONE HCL IN WATER/PF 6 MG/30 ML
0.2 PATIENT CONTROLLED ANALGESIA SYRINGE INTRAVENOUS EVERY 5 MIN PRN
Status: DISCONTINUED | OUTPATIENT
Start: 2024-04-01 | End: 2024-04-01 | Stop reason: HOSPADM

## 2024-04-01 RX ORDER — MEPERIDINE HYDROCHLORIDE 25 MG/ML
12.5 INJECTION INTRAMUSCULAR; INTRAVENOUS; SUBCUTANEOUS EVERY 5 MIN PRN
Status: DISCONTINUED | OUTPATIENT
Start: 2024-04-01 | End: 2024-04-01 | Stop reason: HOSPADM

## 2024-04-01 RX ADMIN — PROPOFOL 150 MCG/KG/MIN: 10 INJECTION, EMULSION INTRAVENOUS at 09:02

## 2024-04-01 RX ADMIN — PROPOFOL 100 MG: 10 INJECTION, EMULSION INTRAVENOUS at 09:02

## 2024-04-01 RX ADMIN — SODIUM CHLORIDE, POTASSIUM CHLORIDE, SODIUM LACTATE AND CALCIUM CHLORIDE: 600; 310; 30; 20 INJECTION, SOLUTION INTRAVENOUS at 09:02

## 2024-04-01 RX ADMIN — ONDANSETRON 4 MG: 2 INJECTION INTRAMUSCULAR; INTRAVENOUS at 09:02

## 2024-04-01 ASSESSMENT — ACTIVITIES OF DAILY LIVING (ADL)
ADLS_ACUITY_SCORE: 35
ADLS_ACUITY_SCORE: 35

## 2024-04-01 NOTE — H&P
PRE-PROCEDURE H&P    CHIEF COMPLAINT / REASON FOR PROCEDURE:  screening, family history of polyps    PERTINENT HISTORY :    Past Medical History:   Diagnosis Date    Allergic state, subsequent encounter 7/12/2017    Depression     Hypertension     Morbid obesity, unspecified obesity type (H) 7/12/2017    Right foot pain     Sleep apnea     uses CPAP    Tibia/fibula fracture 4/23/2014      Past Surgical History:   Procedure Laterality Date    BUNIONECTOMY  12/5/2013    Procedure: BUNIONECTOMY;   correct of bunion and bunionette deformation right with oteotomies;  Surgeon: Tanesha Rosa DPM;  Location: [facility] OR    HC CORRECT BUNION,SIMPLE  12/2004    fusion metatarsal    HC EXCIS PRIMARY GANGLION WRIST  1994, 1995    left     HC EXCIS RECURRENT GANGLION WRIST   2009, 2012    right    HC REMOVE TONSILS/ADENOIDS,<13 Y/O      age 8    LAPAROSCOPIC CHOLECYSTECTOMY N/A 11/4/2015    Procedure: LAPAROSCOPIC CHOLECYSTECTOMY;  Surgeon: Alyson Horne MD;  Location: [facility] OR         Bleeding tendencies:  No    Relevant Family History:  brother polyps    Relevant Social History:  NONE      A relevant review of systems was performed and was negative    Current symptoms include: none    ALLERGIES/SENSITIVITIES:   Allergies   Allergen Reactions    No Known Drug Allergy        CURRENT MEDICATIONS:   Prior to Admission Medications   Prescriptions Last Dose Informant Patient Reported? Taking?   buPROPion (WELLBUTRIN XL) 300 MG 24 hr tablet 3/31/2024  Yes Yes   Sig: Take 300 mg by mouth every morning   cholecalciferol (VITAMIN D3) 5000 UNITS CAPS capsule Past Month  Yes Yes   Sig: Take by mouth daily   clonazePAM (KLONOPIN) 1 MG tablet prn  Yes No   Sig: Take 1 tablet (1 mg) by mouth as needed for anxiety (Uses very rare) Take one as needed for anxiety   escitalopram (LEXAPRO) 20 MG tablet 3/31/2024  No Yes   Sig: Take 1 tablet (20 mg) by mouth daily   fexofenadine (ALLEGRA) 180 MG tablet   Yes No   Sig: Take 180 mg by mouth  daily   fluticasone (FLONASE) 50 MCG/ACT nasal spray Past Week  Yes Yes   Sig: Spray 1 spray into both nostrils daily   hydrOXYzine ximena (VISTARIL) 25 MG capsule Past Week  No Yes   Sig: Take 1-2 tablets up to 3 x a day for anxiety   losartan (COZAAR) 50 MG tablet 3/31/2024  No Yes   Sig: Take 1 tablet (50 mg) by mouth daily      Facility-Administered Medications: None        PRE-SEDATION ASSESSMENT:    Lung Exam:  normal  Heart Exam:  normal  Airway Exam: abnormal thick neck  Previous reaction to anesthesia/sedation:   No  Sedation plan based on assessment: per anesthesia  ASA Classification:  4 - Severe systemic disease that is a constant threat to life    Comments: severe sleep apnea    IMPRESSION:  risks explained    PLAN:  colonoscopy     Alyson Crain MD, MD  [facility]  Office: [phone]

## 2024-04-01 NOTE — ANESTHESIA PREPROCEDURE EVALUATION
Anesthesia Pre-Procedure Evaluation    Patient: Corteta Huitron   MRN: [id number] : 1980        Procedure : Procedure(s):  Colonoscopy          Past Medical History:   Diagnosis Date    Allergic state, subsequent encounter 2017    Depression     Hypertension     Morbid obesity, unspecified obesity type (H) 2017    Right foot pain     Sleep apnea     uses CPAP    Tibia/fibula fracture 2014      Past Surgical History:   Procedure Laterality Date    BUNIONECTOMY  2013    Procedure: BUNIONECTOMY;   correct of bunion and bunionette deformation right with oteotomies;  Surgeon: Tanesha Rosa DPM;  Location: [facility] OR    HC CORRECT BUNION,SIMPLE  2004    fusion metatarsal    HC EXCIS PRIMARY GANGLION WRIST  ,     left     HC EXCIS RECURRENT GANGLION WRIST   ,     right    HC REMOVE TONSILS/ADENOIDS,<11 Y/O      age 8    LAPAROSCOPIC CHOLECYSTECTOMY N/A 2015    Procedure: LAPAROSCOPIC CHOLECYSTECTOMY;  Surgeon: Alyson Horne MD;  Location: [facility] OR      Allergies   Allergen Reactions    No Known Drug Allergy       Social History     Tobacco Use    Smoking status: Never     Passive exposure: Never    Smokeless tobacco: Never   Substance Use Topics    Alcohol use: Yes     Comment: very rare-once a year      Wt Readings from Last 1 Encounters:   24 (!) 204.6 kg (451 lb)        Anesthesia Evaluation            ROS/MED HX  ENT/Pulmonary:     (+) sleep apnea, severe, uses CPAP,                                      Neurologic:       Cardiovascular:     (+) Dyslipidemia hypertension- -   -  - -                                      METS/Exercise Tolerance:     Hematologic:       Musculoskeletal:       GI/Hepatic:       Renal/Genitourinary:       Endo:     (+)               Obesity (BMI 68),       Psychiatric/Substance Use:       Infectious Disease:       Malignancy:       Other:            Physical Exam    Airway        Mallampati: III   TM distance: > 3 FB   Neck  "ROM: full   Mouth opening: > 3 cm    Respiratory Devices and Support         Dental           Cardiovascular          Rhythm and rate: regular     Pulmonary           breath sounds clear to auscultation           OUTSIDE LABS:  CBC:   Lab Results   Component Value Date    WBC 7.0 01/18/2022    WBC 7.0 12/17/2020    HGB 15.4 01/18/2022    HGB 13.9 12/17/2020    HCT 46.1 01/18/2022    HCT 42.5 12/17/2020     01/18/2022     12/17/2020     BMP:   Lab Results   Component Value Date    .1 01/22/2024    .1 06/19/2023    POTASSIUM 4.68 01/22/2024    POTASSIUM 4.17 06/19/2023    CHLORIDE 102.5 01/22/2024    CHLORIDE 103.8 06/19/2023    CO2 28.3 01/22/2024    CO2 28.7 06/19/2023    BUN 12 01/22/2024    BUN 13.2 01/22/2024    CR 0.91 01/22/2024    CR 0.78 06/19/2023    GLC 97 01/22/2024    GLC 96 06/19/2023     COAGS: No results found for: \"PTT\", \"INR\", \"FIBR\"  POC:   Lab Results   Component Value Date    BGM 76 11/04/2015    HCG Negative 11/04/2015     HEPATIC:   Lab Results   Component Value Date    ALBUMIN 4.2 01/22/2024    PROTTOTAL 7.1 01/22/2024    ALT 19 07/11/2016    AST 12 07/11/2016    ALKPHOS 67 01/22/2024    BILITOTAL 0.7 01/22/2024     OTHER:   Lab Results   Component Value Date    PH 7.0 01/16/2009    LACT 1.0 07/12/2016    A1C 5.3 01/22/2024    MELYSSA 9.6 01/22/2024    TSH 2.76 06/19/2023    T4 1.5 01/18/2022    CRP 5.2 (H) 04/19/2013    SED 40 (A) 04/19/2013       Anesthesia Plan    ASA Status:  4       Anesthesia Type: MAC.              Consents    Anesthesia Plan(s) and associated risks, benefits, and realistic alternatives discussed. Questions answered and patient/representative(s) expressed understanding.     - Discussed: Risks, Benefits and Alternatives for BOTH SEDATION and the PROCEDURE were discussed     - Discussed with:  Patient            Postoperative Care            Comments:               Pao Sampson MD    I have reviewed the pertinent notes and labs in the chart from " "the past 30 days and (re)examined the patient.  Any updates or changes from those notes are reflected in this note.              # Severe Obesity: Estimated body mass index is 67.58 kg/m  as calculated from the following:    Height as of 3/18/24: 1.74 m (5' 8.5\").    Weight as of this encounter: 204.6 kg (451 lb).      "

## 2024-04-01 NOTE — ANESTHESIA CARE TRANSFER NOTE
Patient: Coretta Huitron    Procedure: Procedure(s):  Colonoscopy       Diagnosis: Special screening for malignant neoplasms, colon [Z12.11]  Diagnosis Additional Information: No value filed.    Anesthesia Type:   MAC     Note:    Oropharynx: oropharynx clear of all foreign objects and spontaneously breathing  Level of Consciousness: awake  Oxygen Supplementation: face mask  Level of Supplemental Oxygen (L/min / FiO2): 8  Independent Airway: airway patency satisfactory and stable  Dentition: dentition unchanged  Vital Signs Stable: post-procedure vital signs reviewed and stable  Report to RN Given: handoff report given  Patient transferred to:  Recovery    Handoff Report: Identifed the Patient, Identified the Reponsible Provider, Reviewed the pertinent medical history, Discussed the surgical course, Reviewed Intra-OP anesthesia mangement and issues during anesthesia, Set expectations for post-procedure period and Allowed opportunity for questions and acknowledgement of understanding      Vitals:  Vitals Value Taken Time   /68 04/01/24 0919   Temp     Pulse 72 04/01/24 0919   Resp 27 04/01/24 0920   SpO2 99 % 04/01/24 0920   Vitals shown include unfiled device data.    Electronically Signed By: HERBERT Estevez CRNA  April 1, 2024  9:21 AM

## 2024-04-27 ENCOUNTER — OFFICE VISIT (OUTPATIENT)
Dept: URGENT CARE | Facility: URGENT CARE | Age: 44
End: 2024-04-27
Payer: COMMERCIAL

## 2024-04-27 ENCOUNTER — ANCILLARY PROCEDURE (OUTPATIENT)
Dept: GENERAL RADIOLOGY | Facility: CLINIC | Age: 44
End: 2024-04-27
Attending: NURSE PRACTITIONER
Payer: COMMERCIAL

## 2024-04-27 VITALS
RESPIRATION RATE: 24 BRPM | HEART RATE: 73 BPM | BODY MASS INDEX: 43.4 KG/M2 | HEIGHT: 69 IN | TEMPERATURE: 97.5 F | WEIGHT: 293 LBS | DIASTOLIC BLOOD PRESSURE: 83 MMHG | SYSTOLIC BLOOD PRESSURE: 121 MMHG | OXYGEN SATURATION: 98 %

## 2024-04-27 DIAGNOSIS — R05.8 PRODUCTIVE COUGH: Primary | ICD-10-CM

## 2024-04-27 DIAGNOSIS — R05.2 SUBACUTE COUGH: ICD-10-CM

## 2024-04-27 LAB
BASOPHILS # BLD AUTO: 0 10E3/UL (ref 0–0.2)
BASOPHILS NFR BLD AUTO: 1 %
EOSINOPHIL # BLD AUTO: 0.1 10E3/UL (ref 0–0.7)
EOSINOPHIL NFR BLD AUTO: 1 %
ERYTHROCYTE [DISTWIDTH] IN BLOOD BY AUTOMATED COUNT: 12.9 % (ref 10–15)
HCT VFR BLD AUTO: 41 % (ref 35–47)
HGB BLD-MCNC: 13.6 G/DL (ref 11.7–15.7)
IMM GRANULOCYTES # BLD: 0 10E3/UL
IMM GRANULOCYTES NFR BLD: 0 %
LYMPHOCYTES # BLD AUTO: 1.4 10E3/UL (ref 0.8–5.3)
LYMPHOCYTES NFR BLD AUTO: 24 %
MCH RBC QN AUTO: 32.4 PG (ref 26.5–33)
MCHC RBC AUTO-ENTMCNC: 33.2 G/DL (ref 31.5–36.5)
MCV RBC AUTO: 98 FL (ref 78–100)
MONOCYTES # BLD AUTO: 0.6 10E3/UL (ref 0–1.3)
MONOCYTES NFR BLD AUTO: 11 %
NEUTROPHILS # BLD AUTO: 3.5 10E3/UL (ref 1.6–8.3)
NEUTROPHILS NFR BLD AUTO: 63 %
PLATELET # BLD AUTO: 356 10E3/UL (ref 150–450)
RBC # BLD AUTO: 4.2 10E6/UL (ref 3.8–5.2)
WBC # BLD AUTO: 5.6 10E3/UL (ref 4–11)

## 2024-04-27 PROCEDURE — 99203 OFFICE O/P NEW LOW 30 MIN: CPT | Performed by: NURSE PRACTITIONER

## 2024-04-27 PROCEDURE — 36415 COLL VENOUS BLD VENIPUNCTURE: CPT | Performed by: NURSE PRACTITIONER

## 2024-04-27 PROCEDURE — 85025 COMPLETE CBC W/AUTO DIFF WBC: CPT | Performed by: NURSE PRACTITIONER

## 2024-04-27 PROCEDURE — 71046 X-RAY EXAM CHEST 2 VIEWS: CPT | Mod: TC | Performed by: RADIOLOGY

## 2024-04-27 RX ORDER — CEFDINIR 300 MG/1
300 CAPSULE ORAL 2 TIMES DAILY
Qty: 14 CAPSULE | Refills: 0 | Status: SHIPPED | OUTPATIENT
Start: 2024-04-27 | End: 2024-05-04

## 2024-04-27 NOTE — PATIENT INSTRUCTIONS
Results for orders placed or performed in visit on 04/27/24   XR Chest 2 Views     Status: None    Narrative    EXAM: XR CHEST 2 VIEWS  LOCATION: [facility]  DATE: 4/27/2024    INDICATION:  Productive cough, Subacute cough  COMPARISON: 12/17/2020      Impression    IMPRESSION: Negative chest with no change.   CBC with platelets and differential     Status: None   Result Value Ref Range    WBC Count 5.6 4.0 - 11.0 10e3/uL    RBC Count 4.20 3.80 - 5.20 10e6/uL    Hemoglobin 13.6 11.7 - 15.7 g/dL    Hematocrit 41.0 35.0 - 47.0 %    MCV 98 78 - 100 fL    MCH 32.4 26.5 - 33.0 pg    MCHC 33.2 31.5 - 36.5 g/dL    RDW 12.9 10.0 - 15.0 %    Platelet Count 356 150 - 450 10e3/uL    % Neutrophils 63 %    % Lymphocytes 24 %    % Monocytes 11 %    % Eosinophils 1 %    % Basophils 1 %    % Immature Granulocytes 0 %    Absolute Neutrophils 3.5 1.6 - 8.3 10e3/uL    Absolute Lymphocytes 1.4 0.8 - 5.3 10e3/uL    Absolute Monocytes 0.6 0.0 - 1.3 10e3/uL    Absolute Eosinophils 0.1 0.0 - 0.7 10e3/uL    Absolute Basophils 0.0 0.0 - 0.2 10e3/uL    Absolute Immature Granulocytes 0.0 <=0.4 10e3/uL   CBC with platelets and differential     Status: None    Narrative    The following orders were created for panel order CBC with platelets and differential.  Procedure                               Abnormality         Status                     ---------                               -----------         ------                     CBC with platelets and d...[[id number]]                      Final result                 Please view results for these tests on the individual orders.   Cefdinir twice a day for subacute cough with augmentin in the last 2 weeks.   And pt refusing zpak.    Push fluids  Lots of handwashing.   Ibuprofen as needed for fever or pain  Delsym or dayquil/nyquil for cough as needed     Rest as able.   Will call if any other labs positive.    F/u in the clinic if symptoms persist or worsen.

## 2024-04-27 NOTE — PROGRESS NOTES
Assessment & Plan     Productive cough  - CBC with platelets and differential  - XR Chest 2 Views  - CBC with platelets and differential  - cefdinir (OMNICEF) 300 MG capsule  Dispense: 14 capsule; Refill: 0    Subacute cough  - CBC with platelets and differential  - XR Chest 2 Views  - CBC with platelets and differential  - cefdinir (OMNICEF) 300 MG capsule  Dispense: 14 capsule; Refill: 0     Patient Instructions     Results for orders placed or performed in visit on 04/27/24   XR Chest 2 Views     Status: None    Narrative    EXAM: XR CHEST 2 VIEWS  LOCATION: [facility]  DATE: 4/27/2024    INDICATION:  Productive cough, Subacute cough  COMPARISON: 12/17/2020      Impression    IMPRESSION: Negative chest with no change.   CBC with platelets and differential     Status: None   Result Value Ref Range    WBC Count 5.6 4.0 - 11.0 10e3/uL    RBC Count 4.20 3.80 - 5.20 10e6/uL    Hemoglobin 13.6 11.7 - 15.7 g/dL    Hematocrit 41.0 35.0 - 47.0 %    MCV 98 78 - 100 fL    MCH 32.4 26.5 - 33.0 pg    MCHC 33.2 31.5 - 36.5 g/dL    RDW 12.9 10.0 - 15.0 %    Platelet Count 356 150 - 450 10e3/uL    % Neutrophils 63 %    % Lymphocytes 24 %    % Monocytes 11 %    % Eosinophils 1 %    % Basophils 1 %    % Immature Granulocytes 0 %    Absolute Neutrophils 3.5 1.6 - 8.3 10e3/uL    Absolute Lymphocytes 1.4 0.8 - 5.3 10e3/uL    Absolute Monocytes 0.6 0.0 - 1.3 10e3/uL    Absolute Eosinophils 0.1 0.0 - 0.7 10e3/uL    Absolute Basophils 0.0 0.0 - 0.2 10e3/uL    Absolute Immature Granulocytes 0.0 <=0.4 10e3/uL   CBC with platelets and differential     Status: None    Narrative    The following orders were created for panel order CBC with platelets and differential.  Procedure                               Abnormality         Status                     ---------                               -----------         ------                     CBC with platelets and d...[[id number]]                      Final result     "             Please view results for these tests on the individual orders.   Cefdinir twice a day for subacute cough with augmentin in the last 2 weeks.   And pt refusing zpak.    Push fluids  Lots of handwashing.   Ibuprofen as needed for fever or pain  Delsym or dayquil/nyquil for cough as needed     Rest as able.   Will call if any other labs positive.    F/u in the clinic if symptoms persist or worsen.        Return in about 1 week (around 5/4/2024) for with regular provider if symptoms persist.    HERBERT Harding CNP  [facility] [place] Logan is a 43 year old female who presents to clinic today for the following health issues:  Chief Complaint   Patient presents with    Urgent Care     Cough x 3-4 weeks.      HPI    URI Adult    Onset of symptoms was 3 week(s) ago.  Course of illness is worsening.    Severity moderately severe  Current and Associated symptoms: cough - productive, shortness of breath, and fatigue  Treatment measures tried include Tylenol/Ibuprofen, OTC Cough med, Fluids, and augmentin for a presumed sinus infection.  Predisposing factors include recent illness.      Review of Systems  Constitutional, HEENT, cardiovascular, pulmonary, GI, , musculoskeletal, neuro, skin, endocrine and psych systems are negative, except as otherwise noted.      Objective    /83   Pulse 73   Temp 97.5  F (36.4  C) (Tympanic)   Resp 24   Ht 1.74 m (5' 8.5\")   Wt (!) 204.6 kg (451 lb)   SpO2 98%   BMI 67.58 kg/m    Physical Exam   GENERAL: alert and no distress  EYES: Eyes grossly normal to inspection, PERRL and conjunctivae and sclerae normal  HENT: ear canals and TM's normal, nose and mouth without ulcers or lesions  NECK: no adenopathy, no asymmetry, masses, or scars  RESP: harsh wet cough, lungs clear to auscultation - no rales, rhonchi or wheezes and decreased breath sounds throughout  CV: regular rate and rhythm, normal S1 S2, no S3 or S4, no murmur, " click or rub, no peripheral edema  MS: no gross musculoskeletal defects noted, no edema

## 2024-05-02 ENCOUNTER — TELEPHONE (OUTPATIENT)
Dept: NURSING | Facility: CLINIC | Age: 44
End: 2024-05-02
Payer: COMMERCIAL

## 2024-05-02 NOTE — TELEPHONE ENCOUNTER
Pt seen at  on 4/27 and was prescribed antibiotics. Symptoms have improved but cough has not cleared up.    She asks for antibiotic refill from UC provider.    FNA advised  providers do not renew Rx, specially antibiotics. May reach out to PCP or be seen at  again.    FNA offered triage but she declined. She will call her PCP.    Farheen Richmond RN/[place] Nurse Advisor

## 2024-07-03 ENCOUNTER — OFFICE VISIT (OUTPATIENT)
Dept: FAMILY MEDICINE | Facility: CLINIC | Age: 44
End: 2024-07-03

## 2024-07-03 VITALS
SYSTOLIC BLOOD PRESSURE: 136 MMHG | DIASTOLIC BLOOD PRESSURE: 78 MMHG | TEMPERATURE: 97.5 F | HEART RATE: 72 BPM | OXYGEN SATURATION: 98 %

## 2024-07-03 DIAGNOSIS — M77.12 LATERAL EPICONDYLITIS, LEFT ELBOW: Primary | ICD-10-CM

## 2024-07-03 PROCEDURE — 99213 OFFICE O/P EST LOW 20 MIN: CPT | Performed by: PHYSICIAN ASSISTANT

## 2024-07-03 NOTE — NURSING NOTE
Chief Complaint   Patient presents with    Consult     Left elbow pain, would like PT referral, has been going on for over a month, extension is hard and extended lifting is hard, wakes her up at night. Same thing happened pt thinks in the right arm and Dr Ramirez sent her to [place] [facility] and it got a lot better.     Pre-visit Screening:  Immunizations:  up to date  Colonoscopy:  is up to date  Mammogram: is up to date  Asthma Action Test/Plan:  na  PHQ9:  na  GAD7:  na  Questioned patient about current smoking habits Pt. has never smoked.  Ok to leave detailed message on voice mail for today's visit only yes, phone # [phone] (home) [phone] (work)

## 2024-07-17 NOTE — PROGRESS NOTES
OCCUPATIONAL THERAPY EVALUATION  Type of Visit: Evaluation     Fall Risk Screen:  Fall screen completed by: OT  Have you fallen 2 or more times in the past year?: No  Have you fallen and had an injury in the past year?: No  Is patient a fall risk?: No    Subjective      Presenting condition or subjective complaint: left arm painL elbow pain, stiffness, wakes up at nighttime  Date of onset: 07/03/24 (order date)    Relevant medical history: Depression; High blood pressure; Overweight; Sleep disorder like apnea   Dates & types of surgery: tonsils adenoid removal, multiple ganglion cyst removals both wrists, bunionectomies both feet, galbladder removal    Prior diagnostic imaging/testing results:       Prior therapy history for the same diagnosis, illness or injury: No      Prior Level of Function  Transfers: Independent  Ambulation: Independent  ADL: Independent  IADL:     Living Environment  Social support: Alone   Type of home: House   Stairs to enter the home: Yes 3 Is there a railing: Yes   Ramp: No   Stairs inside the home: Yes 20     Help at home: None  Equipment owned:        Employment: Yes Nursing professor  Hobbies/Interests: arts and crafts    Patient goals for therapy: extend, lift, move without pain    Pain assessment:      Objective   ADDITIONAL HISTORY:  Right hand dominant  Patient reports symptoms of pain, stiffness/loss of motion, and weakness/loss of strength  Transportation: drives  Currently working in normal job without restrictions    Functional Outcome Measure:   Upper Extremity Functional Index Score:  SCORE:   Column Totals: /80: 45   (A lower score indicates greater disability.)    Pain Level (Scale 0-10)   7/17/2024   At Rest 0/10   With Use 8/10     Pain Description  Date 7/17/2024   Location elbow   Pain Quality Sharp, intense   Frequency intermittent     Pain is worst  nighttime   Exacerbated by  Lifting, elbow extension, reaching, supination, gripping   Relieved by rest and pronation    Progression Gradually worsening     Edema (Circumference measured in cm)   7/18/2024 7/18/2024    R L   Proximal elbow crease 43.2 38.1     SENSATION: WNL throughout all nerve distributions; per patient report     ROM  Pain Report: - none  + mild    ++ moderate    +++ severe   Elbow 7/17/2024 7/17/2024   AROM (PROM) R L   Extension 18 18 +   Flexion 145 138   Supination 74 67   Pronation WNL WNL     Wrist 7/17/2024 7/17/2024   AROM (PROM) R L   Extension 66 75   Flexion 69 71   RD 24 26   UD 54 60     Resisted Testing  Pain Report:  - none    + mild    ++ moderate    +++ severe    7/17/2024   Supination with elbow flexed 4+/5 +   Supination with elbow extended 3+/5 ++/+++   Wrist Ext with RD, Elbow at side NT d/t pain with AROM ++   Wrist Ext with UD, Elbow at side NT d/t pain with AROM ++   Wrist Ext with RD, Elbow Ext NT d/t pain with AROM   Wrist Ext with UD, Elbow Ext NT d/t pain with AROM   EDC with Elbow at side 5/5 +   EDC with Elbow Ext NT d/t pain   Long Finger Test 4/5 ++     Strength   (Measured in pounds)  Pain Report: - none  + mild    ++ moderate    +++ severe    7/17/2024 7/17/2024   Trials R L   1   105 99   Average 105 99       7/17/2024 7/17/2024    R L   Elbow Ext 96 10     Palpation  Pain Report:  - none    + mild    ++ moderate    +++ severe    7/17/2024   Distal Triceps -   Anconeus ++   ECRB at LEP ++   ECU at LEP ++   EDC at LEP ++   Radial Head -/+   Extensor Wad +   PIN Site ++     Assessment & Plan   CLINICAL IMPRESSIONS  Medical Diagnosis: Lateral epicondylitis    Treatment Diagnosis: elbow pain    Impression/Assessment: Pt is a 43 year old female presenting to Occupational Therapy due to gradual onset.  The following significant findings have been identified: Impaired activity tolerance, Impaired coordination, Impaired ROM, Impaired strength, and Pain.  These identified deficits interfere with their ability to perform self care tasks, work tasks, recreational activities,  household chores, driving , and meal planning and preparation as compared to previous level of function.     Clinical Decision Making (Complexity):  Assessment of Occupational Performance: 5 or more Performance Deficits  Occupational Performance Limitations: bathing/showering, toileting, dressing, feeding, functional mobility, personal device care, hygiene and grooming, driving and community mobility, health management and maintenance, home establishment and management, meal preparation and cleanup, shopping, sleep, work, and leisure activities  Clinical Decision Making (Complexity): Low complexity    PLAN OF CARE  Treatment Interventions:  Modalities:  US, Hot Packs, and Paraffin  Therapeutic Exercise:  AROM, AAROM, PROM, Tendon Gliding, Blocking, Reverse Blocking, Place and Hold, Contract Relax, Extensor Tracking, Isotonics, Isometrics and Stabilization  Neuromuscular re-education:  Nerve Gliding, Coordination/Dexterity, Sensory re-education, Desensitization, Kinesthetic Training, Proprioceptive Training, Kinesiotaping, Strain Counter Strain, Isometrics, Stabilization   Manual Techniques:  Coordination/Dexterity, Joint mobilization, Scar mobilization, Friction massage, Myofascial release, and Manual edema mobilization  Orthotic Fabrication:  Static and Forearm based  Self Care:  Self Care Tasks, Ergonomic Considerations, and Work Tasks    Long Term Goals   OT Goal 1  Goal Identifier: Household IADLs - gripping  Goal Description: Open a tight or new jar with mild to no difficulty (3 UEFI score or higher)  Rationale: In order to maximize safety and independence with performance of self-care activities  Goal Progress: Goal initiated      Frequency of Treatment: 1x/week  Duration of Treatment: 4 weeks, tapering to biweekly for 8 weeks     Recommended Referrals to Other Professionals:   Education Assessment: Learner/Method: Patient;Pictures/Video  Education Comments: PTRX on phone     Risks and benefits of  evaluation/treatment have been explained.   Patient/Family/caregiver agrees with Plan of Care.     Evaluation Time:    OT Eval, Low Complexity Minutes (15046): (P) 30       Signing Clinician: Viri Zaragoza OT

## 2024-07-18 ENCOUNTER — THERAPY VISIT (OUTPATIENT)
Dept: OCCUPATIONAL THERAPY | Facility: CLINIC | Age: 44
End: 2024-07-18
Attending: PHYSICIAN ASSISTANT
Payer: COMMERCIAL

## 2024-07-18 DIAGNOSIS — M77.12 LATERAL EPICONDYLITIS, LEFT ELBOW: Primary | ICD-10-CM

## 2024-07-18 PROCEDURE — 97530 THERAPEUTIC ACTIVITIES: CPT | Mod: GO | Performed by: OCCUPATIONAL THERAPIST

## 2024-07-18 PROCEDURE — 97165 OT EVAL LOW COMPLEX 30 MIN: CPT | Mod: GO | Performed by: OCCUPATIONAL THERAPIST

## 2024-07-18 PROCEDURE — 97760 ORTHOTIC MGMT&TRAING 1ST ENC: CPT | Mod: GO | Performed by: OCCUPATIONAL THERAPIST

## 2024-08-01 ENCOUNTER — THERAPY VISIT (OUTPATIENT)
Dept: OCCUPATIONAL THERAPY | Facility: CLINIC | Age: 44
End: 2024-08-01
Payer: COMMERCIAL

## 2024-08-01 DIAGNOSIS — M77.12 LATERAL EPICONDYLITIS, LEFT ELBOW: Primary | ICD-10-CM

## 2024-08-01 PROCEDURE — 97112 NEUROMUSCULAR REEDUCATION: CPT | Mod: GO | Performed by: OCCUPATIONAL THERAPIST

## 2024-08-01 PROCEDURE — 97035 APP MDLTY 1+ULTRASOUND EA 15: CPT | Mod: GO | Performed by: OCCUPATIONAL THERAPIST

## 2024-08-01 PROCEDURE — 97140 MANUAL THERAPY 1/> REGIONS: CPT | Mod: GO | Performed by: OCCUPATIONAL THERAPIST

## 2024-08-12 ENCOUNTER — THERAPY VISIT (OUTPATIENT)
Dept: OCCUPATIONAL THERAPY | Facility: CLINIC | Age: 44
End: 2024-08-12
Payer: COMMERCIAL

## 2024-08-12 DIAGNOSIS — M77.12 LATERAL EPICONDYLITIS, LEFT ELBOW: Primary | ICD-10-CM

## 2024-08-12 PROCEDURE — 97035 APP MDLTY 1+ULTRASOUND EA 15: CPT | Mod: GO | Performed by: OCCUPATIONAL THERAPIST

## 2024-08-12 PROCEDURE — 97112 NEUROMUSCULAR REEDUCATION: CPT | Mod: GO | Performed by: OCCUPATIONAL THERAPIST

## 2024-08-12 PROCEDURE — 97140 MANUAL THERAPY 1/> REGIONS: CPT | Mod: GO | Performed by: OCCUPATIONAL THERAPIST

## 2024-08-22 ENCOUNTER — MYC MEDICAL ADVICE (OUTPATIENT)
Dept: FAMILY MEDICINE | Facility: CLINIC | Age: 44
End: 2024-08-22

## 2024-08-22 DIAGNOSIS — I10 BENIGN ESSENTIAL HYPERTENSION: ICD-10-CM

## 2024-08-22 DIAGNOSIS — F33.1 MAJOR DEPRESSIVE DISORDER, RECURRENT EPISODE, MODERATE (H): ICD-10-CM

## 2024-08-22 DIAGNOSIS — F41.1 GAD (GENERALIZED ANXIETY DISORDER): ICD-10-CM

## 2024-08-22 RX ORDER — LOSARTAN POTASSIUM 50 MG/1
50 TABLET ORAL DAILY
COMMUNITY
Start: 2024-08-22

## 2024-08-22 NOTE — TELEPHONE ENCOUNTER
Coretta Huitron is requesting a refill of:    Refused Prescriptions:                       Disp   Refills    losartan (COZAAR) 50 MG tablet [Pharmacy M*                Sig: TAKE 1 TABLET(50 MG) BY MOUTH DAILY  Refused By: GUERITA LAWRENCE  Reason for Refusal: Patient needs appointment    Pt due for OV

## 2024-08-26 RX ORDER — BUPROPION HYDROCHLORIDE 300 MG/1
300 TABLET ORAL EVERY MORNING
COMMUNITY
Start: 2024-08-26

## 2024-08-26 NOTE — TELEPHONE ENCOUNTER
Received incoming refill request for  Pending Prescriptions:                       Disp   Refills    buPROPion (WELLBUTRIN XL) 300 MG 24 hr ta*90 tab*             Sig: TAKE 1 TABLET(300 MG) BY MOUTH EVERY MORNING    Patient is due for med check -mychart already sent.

## 2024-08-30 ENCOUNTER — THERAPY VISIT (OUTPATIENT)
Dept: OCCUPATIONAL THERAPY | Facility: CLINIC | Age: 44
End: 2024-08-30
Payer: COMMERCIAL

## 2024-08-30 DIAGNOSIS — M77.12 LATERAL EPICONDYLITIS, LEFT ELBOW: Primary | ICD-10-CM

## 2024-08-30 PROCEDURE — 97035 APP MDLTY 1+ULTRASOUND EA 15: CPT | Mod: GO | Performed by: OCCUPATIONAL THERAPIST

## 2024-08-30 PROCEDURE — 97763 ORTHC/PROSTC MGMT SBSQ ENC: CPT | Mod: GO | Performed by: OCCUPATIONAL THERAPIST

## 2024-08-30 PROCEDURE — 97112 NEUROMUSCULAR REEDUCATION: CPT | Mod: GO | Performed by: OCCUPATIONAL THERAPIST

## 2024-09-06 ENCOUNTER — THERAPY VISIT (OUTPATIENT)
Dept: OCCUPATIONAL THERAPY | Facility: CLINIC | Age: 44
End: 2024-09-06
Payer: COMMERCIAL

## 2024-09-06 DIAGNOSIS — M77.12 LATERAL EPICONDYLITIS, LEFT ELBOW: Primary | ICD-10-CM

## 2024-09-06 DIAGNOSIS — F33.1 MAJOR DEPRESSIVE DISORDER, RECURRENT EPISODE, MODERATE (H): ICD-10-CM

## 2024-09-06 DIAGNOSIS — F41.1 GAD (GENERALIZED ANXIETY DISORDER): ICD-10-CM

## 2024-09-06 PROCEDURE — 97035 APP MDLTY 1+ULTRASOUND EA 15: CPT | Mod: GO | Performed by: OCCUPATIONAL THERAPIST

## 2024-09-06 PROCEDURE — 97140 MANUAL THERAPY 1/> REGIONS: CPT | Mod: GO | Performed by: OCCUPATIONAL THERAPIST

## 2024-09-06 PROCEDURE — 97112 NEUROMUSCULAR REEDUCATION: CPT | Mod: GO | Performed by: OCCUPATIONAL THERAPIST

## 2024-09-06 RX ORDER — ESCITALOPRAM OXALATE 20 MG/1
20 TABLET ORAL DAILY
Qty: 14 TABLET | Refills: 0 | Status: SHIPPED | OUTPATIENT
Start: 2024-09-06 | End: 2024-09-16

## 2024-09-06 RX ORDER — ESCITALOPRAM OXALATE 20 MG/1
20 TABLET ORAL DAILY
Qty: 90 TABLET | Refills: 1 | OUTPATIENT
Start: 2024-09-06

## 2024-09-06 RX ORDER — BUPROPION HYDROCHLORIDE 300 MG/1
300 TABLET ORAL EVERY MORNING
Qty: 14 TABLET | Refills: 0 | Status: SHIPPED | OUTPATIENT
Start: 2024-09-06 | End: 2024-09-16

## 2024-09-06 RX ORDER — LOSARTAN POTASSIUM 50 MG/1
50 TABLET ORAL DAILY
Qty: 14 TABLET | Refills: 0 | Status: SHIPPED | OUTPATIENT
Start: 2024-09-06 | End: 2024-09-16

## 2024-09-06 NOTE — TELEPHONE ENCOUNTER
Pt scheduled appt for 09/16.    Coretta Huitron is requesting a refill of:    Pending Prescriptions:                       Disp   Refills    buPROPion (WELLBUTRIN XL) 300 MG 24 hr ta*14 tab*0            Sig: Take 1 tablet (300 mg) by mouth every morning.    escitalopram (LEXAPRO) 20 MG tablet       14 tab*0            Sig: Take 1 tablet (20 mg) by mouth daily.    losartan (COZAAR) 50 MG tablet            14 tab*0            Sig: Take 1 tablet (50 mg) by mouth daily.

## 2024-09-06 NOTE — TELEPHONE ENCOUNTER
Pt needs refill on bupropion, escitalopram and losartan sent to her pharmacy [facility]. Is scheduled for 9/16 out of meds    Routing to Frida for Dr. Ramirez

## 2024-09-06 NOTE — TELEPHONE ENCOUNTER
Coretta Huitron is requesting a refill of:    Refused Prescriptions:                       Disp   Refills    escitalopram (LEXAPRO) 20 MG tablet [Pharm*90 tab*1        Sig: TAKE 1 TABLET(20 MG) BY MOUTH DAILY  Refused By: GUERITA LAWRENCE  Reason for Refusal: Duplicate

## 2024-09-12 ENCOUNTER — HOSPITAL ENCOUNTER (OUTPATIENT)
Dept: MAMMOGRAPHY | Facility: CLINIC | Age: 44
Discharge: HOME | End: 2024-09-12
Attending: STUDENT IN AN ORGANIZED HEALTH CARE EDUCATION/TRAINING PROGRAM | Admitting: STUDENT IN AN ORGANIZED HEALTH CARE EDUCATION/TRAINING PROGRAM
Payer: COMMERCIAL

## 2024-09-12 DIAGNOSIS — Z12.31 ENCOUNTER FOR SCREENING MAMMOGRAM FOR BREAST CANCER: ICD-10-CM

## 2024-09-12 PROCEDURE — 77063 BREAST TOMOSYNTHESIS BI: CPT

## 2024-09-13 ENCOUNTER — THERAPY VISIT (OUTPATIENT)
Dept: OCCUPATIONAL THERAPY | Facility: CLINIC | Age: 44
End: 2024-09-13
Payer: COMMERCIAL

## 2024-09-13 DIAGNOSIS — M77.12 LATERAL EPICONDYLITIS, LEFT ELBOW: Primary | ICD-10-CM

## 2024-09-13 PROCEDURE — 97035 APP MDLTY 1+ULTRASOUND EA 15: CPT | Mod: GO | Performed by: OCCUPATIONAL THERAPIST

## 2024-09-13 PROCEDURE — 97112 NEUROMUSCULAR REEDUCATION: CPT | Mod: GO | Performed by: OCCUPATIONAL THERAPIST

## 2024-09-13 PROCEDURE — 97140 MANUAL THERAPY 1/> REGIONS: CPT | Mod: GO | Performed by: OCCUPATIONAL THERAPIST

## 2024-09-16 ENCOUNTER — OFFICE VISIT (OUTPATIENT)
Dept: FAMILY MEDICINE | Facility: CLINIC | Age: 44
End: 2024-09-16

## 2024-09-16 VITALS
SYSTOLIC BLOOD PRESSURE: 132 MMHG | RESPIRATION RATE: 20 BRPM | WEIGHT: 293 LBS | BODY MASS INDEX: 69.97 KG/M2 | DIASTOLIC BLOOD PRESSURE: 88 MMHG | OXYGEN SATURATION: 97 % | HEART RATE: 82 BPM

## 2024-09-16 DIAGNOSIS — I10 BENIGN ESSENTIAL HYPERTENSION: ICD-10-CM

## 2024-09-16 DIAGNOSIS — F41.0 PANIC ATTACK: ICD-10-CM

## 2024-09-16 DIAGNOSIS — M25.521 CHRONIC ELBOW PAIN, RIGHT: ICD-10-CM

## 2024-09-16 DIAGNOSIS — G89.29 CHRONIC ELBOW PAIN, RIGHT: ICD-10-CM

## 2024-09-16 DIAGNOSIS — E66.01 MORBID OBESITY, UNSPECIFIED OBESITY TYPE (H): Primary | ICD-10-CM

## 2024-09-16 DIAGNOSIS — F33.1 MAJOR DEPRESSIVE DISORDER, RECURRENT EPISODE, MODERATE (H): ICD-10-CM

## 2024-09-16 DIAGNOSIS — F41.1 GAD (GENERALIZED ANXIETY DISORDER): ICD-10-CM

## 2024-09-16 PROCEDURE — 99214 OFFICE O/P EST MOD 30 MIN: CPT | Performed by: STUDENT IN AN ORGANIZED HEALTH CARE EDUCATION/TRAINING PROGRAM

## 2024-09-16 PROCEDURE — G2211 COMPLEX E/M VISIT ADD ON: HCPCS | Performed by: STUDENT IN AN ORGANIZED HEALTH CARE EDUCATION/TRAINING PROGRAM

## 2024-09-16 RX ORDER — LOSARTAN POTASSIUM 50 MG/1
50 TABLET ORAL DAILY
Qty: 90 TABLET | Refills: 1 | Status: SHIPPED | OUTPATIENT
Start: 2024-09-16

## 2024-09-16 RX ORDER — HYDROXYZINE PAMOATE 25 MG/1
CAPSULE ORAL
Qty: 90 CAPSULE | Refills: 5 | Status: SHIPPED | OUTPATIENT
Start: 2024-09-16

## 2024-09-16 RX ORDER — BUPROPION HYDROCHLORIDE 300 MG/1
300 TABLET ORAL EVERY MORNING
Qty: 90 TABLET | Refills: 1 | Status: SHIPPED | OUTPATIENT
Start: 2024-09-16

## 2024-09-16 RX ORDER — ESCITALOPRAM OXALATE 20 MG/1
20 TABLET ORAL DAILY
Qty: 90 TABLET | Refills: 1 | Status: SHIPPED | OUTPATIENT
Start: 2024-09-16

## 2024-09-16 ASSESSMENT — ANXIETY QUESTIONNAIRES
IF YOU CHECKED OFF ANY PROBLEMS ON THIS QUESTIONNAIRE, HOW DIFFICULT HAVE THESE PROBLEMS MADE IT FOR YOU TO DO YOUR WORK, TAKE CARE OF THINGS AT HOME, OR GET ALONG WITH OTHER PEOPLE: SOMEWHAT DIFFICULT
2. NOT BEING ABLE TO STOP OR CONTROL WORRYING: NOT AT ALL
1. FEELING NERVOUS, ANXIOUS, OR ON EDGE: SEVERAL DAYS
GAD7 TOTAL SCORE: 3
3. WORRYING TOO MUCH ABOUT DIFFERENT THINGS: SEVERAL DAYS
5. BEING SO RESTLESS THAT IT IS HARD TO SIT STILL: NOT AT ALL
6. BECOMING EASILY ANNOYED OR IRRITABLE: NOT AT ALL
GAD7 TOTAL SCORE: 3
7. FEELING AFRAID AS IF SOMETHING AWFUL MIGHT HAPPEN: SEVERAL DAYS

## 2024-09-16 ASSESSMENT — PATIENT HEALTH QUESTIONNAIRE - PHQ9
5. POOR APPETITE OR OVEREATING: NOT AT ALL
SUM OF ALL RESPONSES TO PHQ QUESTIONS 1-9: 5

## 2024-09-16 NOTE — PATIENT INSTRUCTIONS
No change to medications     Try advil 400-600 3x/day for 10-14 days    Keep me updated on your arm    Cardiology follow-up as planned     Routine follow-up in 6 mos

## 2024-09-16 NOTE — NURSING NOTE
Chief Complaint   Patient presents with    Recheck Medication     Non fasting medication check and review, needs refills      Pre-visit Screening:  Immunizations:  up to date  Colonoscopy:  NA  Mammogram: is up to date  Asthma Action Test/Plan:  na  PHQ9:  given today  GAD7:  given today   Questioned patient about current smoking habits Pt. has never smoked.  Ok to leave detailed message on voice mail for today's visit only yes, phone # [phone] (home) [phone] (work)

## 2024-09-16 NOTE — PROGRESS NOTES
Assessment & Plan     1. DANIA (generalized anxiety disorder)  2. Major depressive disorder, recurrent episode, moderate (H)  4. Panic attack  Stable, continue current medication   - hydrOXYzine ximena (VISTARIL) 25 MG capsule; Take 1-2 tablets up to 3 x a day for anxiety  Dispense: 90 capsule; Refill: 5   - buPROPion (WELLBUTRIN XL) 300 MG 24 hr tablet; Take 1 tablet (300 mg) by mouth every morning.  Dispense: 90 tablet; Refill: 1  - escitalopram (LEXAPRO) 20 MG tablet; Take 1 tablet (20 mg) by mouth daily.  Dispense: 90 tablet; Refill: 1    3. Benign essential hypertension  Stable, continue current medication, labs at next visit  - losartan (COZAAR) 50 MG tablet; Take 1 tablet (50 mg) by mouth daily.  Dispense: 90 tablet; Refill: 1    5. Morbid obesity, unspecified obesity type (H)  Starting new weight loss program, offered encouragement     6. Chronic elbow pain, right  Plan below, consider referral for PIN injection if needed     Patient Instructions   No change to medications     Try advil 400-600 3x/day for 10-14 days    Keep me updated on your arm    Cardiology follow-up as planned     Routine follow-up in 6 mos     Reasons to follow-up sooner or seek emergent care reviewed.       Agustin Ramirez MD, [facility]      Subjective     Coretta LONDON Nbacelsa is a 44 year old female who presents to clinic today for the following health issues:    HPI   Chief Complaint   Patient presents with    Recheck Medication     Non fasting medication check and review, needs refills       Pt presents for anxiety, depression, and BP med check. Spring was rough on physical and mental health due to illnesses. Continuing bupropion 300mg and Lexapro 20mg daily. Will miss doses here and there and will get a bit dizzy and tingling sensation. In general, mental health is improving. Feels like doses are good. Has been going to therapy which helps a lot, is going at least every other week.     Vistaril ~1x/week sometimes a  little bit more. Works really well to help with anxiety attacks. Would like refill of this if needed.      6/19/2023    10:58 AM 1/29/2024     4:24 PM 9/16/2024     5:12 PM   PHQ   PHQ-9 Total Score 7 6 5   Q9: Thoughts of better off dead/self-harm past 2 weeks Not at all Not at all Not at all         6/19/2023    10:58 AM 1/29/2024     4:24 PM 9/16/2024     5:12 PM   DANIA-7 SCORE   Total Score 4 3 3         Hypertension Follow-up    Do you check your blood pressure regularly outside of the clinic? Yes - 110-120s/70-80s  Are you following a low salt diet? No  Are your blood pressures ever more than 140 on the top number (systolic) OR more   than 90 on the bottom number (diastolic), for example 140/90? No    Continuing losartan 50mg daily without issues. Just a couple episodes of dizziness when going from sitting to standing and will check BP and it will be low. Started weight loss help plan last week that focuses more on clean eating and dietary changes over time.   BP Readings from Last 6 Encounters:   09/16/24 132/88   07/03/24 136/78   04/27/24 121/83   04/01/24 117/73   03/18/24 124/86   01/29/24 136/88       Swelling of R arm increased. Tennis elbow on left arm, just started this summer. Working with hand therapy.         Objective    /88 (BP Location: Left arm, Patient Position: Sitting, Cuff Size: Adult Large)   Pulse 82   Resp 20   Wt (!) 211.8 kg (467 lb)   SpO2 97%   BMI 69.97 kg/m    Body mass index is 69.97 kg/m .  Alert, NAD  NC/AT  Sclerae anicteric  Regular  Resp nonlabored  Skin warm and dry  No focal neuro deficits. Speech intact. Normal gait.  Appropriate affect       Labs reviewed.

## 2024-09-23 ENCOUNTER — THERAPY VISIT (OUTPATIENT)
Dept: OCCUPATIONAL THERAPY | Facility: CLINIC | Age: 44
End: 2024-09-23
Payer: COMMERCIAL

## 2024-09-23 DIAGNOSIS — M77.12 LATERAL EPICONDYLITIS, LEFT ELBOW: Primary | ICD-10-CM

## 2024-09-23 PROCEDURE — 97112 NEUROMUSCULAR REEDUCATION: CPT | Mod: GO | Performed by: OCCUPATIONAL THERAPIST

## 2024-09-23 PROCEDURE — 97035 APP MDLTY 1+ULTRASOUND EA 15: CPT | Mod: GO | Performed by: OCCUPATIONAL THERAPIST

## 2024-09-23 PROCEDURE — 97140 MANUAL THERAPY 1/> REGIONS: CPT | Mod: GO | Performed by: OCCUPATIONAL THERAPIST

## 2024-09-30 ENCOUNTER — THERAPY VISIT (OUTPATIENT)
Dept: OCCUPATIONAL THERAPY | Facility: CLINIC | Age: 44
End: 2024-09-30
Payer: COMMERCIAL

## 2024-09-30 DIAGNOSIS — M77.12 LATERAL EPICONDYLITIS, LEFT ELBOW: Primary | ICD-10-CM

## 2024-09-30 PROCEDURE — 97035 APP MDLTY 1+ULTRASOUND EA 15: CPT | Mod: GO | Performed by: OCCUPATIONAL THERAPIST

## 2024-09-30 PROCEDURE — 97140 MANUAL THERAPY 1/> REGIONS: CPT | Mod: GO | Performed by: OCCUPATIONAL THERAPIST

## 2024-09-30 PROCEDURE — 97112 NEUROMUSCULAR REEDUCATION: CPT | Mod: GO | Performed by: OCCUPATIONAL THERAPIST
